# Patient Record
Sex: MALE | Race: WHITE | NOT HISPANIC OR LATINO | Employment: OTHER | URBAN - METROPOLITAN AREA
[De-identification: names, ages, dates, MRNs, and addresses within clinical notes are randomized per-mention and may not be internally consistent; named-entity substitution may affect disease eponyms.]

---

## 2017-02-13 ENCOUNTER — TELEPHONE (OUTPATIENT)
Dept: MEDICAL GROUP | Facility: MEDICAL CENTER | Age: 72
End: 2017-02-13

## 2017-02-13 NOTE — TELEPHONE ENCOUNTER
Gave pt wife info*-pt wife states you told her he has to be off for 5 days? Pt does not see cardio

## 2017-02-13 NOTE — TELEPHONE ENCOUNTER
Pt needs epidural.  Would need to come off plavix for 10-14 days.  He is only on 1/2 tab qod.  Please check with pt and see if he has a cardiologist.  There is some inc risk of CVA with coming off the plavix but then final decision is up to him.  He can see the cardiologist if he has one.

## 2017-02-26 RX ORDER — ATORVASTATIN CALCIUM 80 MG/1
TABLET, FILM COATED ORAL
Qty: 90 TAB | Refills: 0 | Status: SHIPPED | OUTPATIENT
Start: 2017-02-26 | End: 2017-05-28 | Stop reason: SDUPTHER

## 2017-02-28 ENCOUNTER — OFFICE VISIT (OUTPATIENT)
Dept: MEDICAL GROUP | Facility: MEDICAL CENTER | Age: 72
End: 2017-02-28
Payer: MEDICARE

## 2017-02-28 VITALS
BODY MASS INDEX: 26.01 KG/M2 | TEMPERATURE: 98.6 F | DIASTOLIC BLOOD PRESSURE: 80 MMHG | SYSTOLIC BLOOD PRESSURE: 140 MMHG | HEIGHT: 72 IN | HEART RATE: 76 BPM | WEIGHT: 192 LBS | OXYGEN SATURATION: 98 %

## 2017-02-28 DIAGNOSIS — R73.01 IMPAIRED FASTING GLUCOSE: ICD-10-CM

## 2017-02-28 DIAGNOSIS — R07.9 CHEST PAIN, UNSPECIFIED TYPE: ICD-10-CM

## 2017-02-28 DIAGNOSIS — D69.6 THROMBOCYTOPENIA (HCC): ICD-10-CM

## 2017-02-28 DIAGNOSIS — I10 ESSENTIAL HYPERTENSION: ICD-10-CM

## 2017-02-28 PROCEDURE — 99214 OFFICE O/P EST MOD 30 MIN: CPT | Performed by: NURSE PRACTITIONER

## 2017-02-28 PROCEDURE — G8432 DEP SCR NOT DOC, RNG: HCPCS | Performed by: NURSE PRACTITIONER

## 2017-02-28 PROCEDURE — 1101F PT FALLS ASSESS-DOCD LE1/YR: CPT | Performed by: NURSE PRACTITIONER

## 2017-02-28 PROCEDURE — G8420 CALC BMI NORM PARAMETERS: HCPCS | Performed by: NURSE PRACTITIONER

## 2017-02-28 PROCEDURE — 3017F COLORECTAL CA SCREEN DOC REV: CPT | Performed by: NURSE PRACTITIONER

## 2017-02-28 PROCEDURE — 1036F TOBACCO NON-USER: CPT | Performed by: NURSE PRACTITIONER

## 2017-02-28 PROCEDURE — 4040F PNEUMOC VAC/ADMIN/RCVD: CPT | Performed by: NURSE PRACTITIONER

## 2017-02-28 PROCEDURE — G8484 FLU IMMUNIZE NO ADMIN: HCPCS | Performed by: NURSE PRACTITIONER

## 2017-02-28 NOTE — PROGRESS NOTES
Subjective:      Mello Isabel is a 71 y.o. male who presents with No chief complaint on file.            HPI  Seen in f/u after hosp for chest pain.  He was having a stressful situation at home.  Then the pain occurred while shopping.  He also had a previous episode of pain 5 days before that woke him up.  He went to [facility].  Cardiac w/u and troponin was neg.  All sx resolved.  Bp was high.   His stress test was neg.  EF 58%.  THERE was a ? Of prior inf infarct???  Lab in hosp shows a1c up from 6.3 to 6.8.  LP is [facility].  CBC is wnl except sl low plts.   He just had another tc about the stress at home just before appt today.  Bp is again high.  Hasn't been checking bp at home.  He is now feeling well.     He has been having thoracic chest pain radiating to the chest.  He just got a injection in his back last week. It is helping some.  He is still waking up in am with the pain. Will get better throughout the day.      Patient Active Problem List    Diagnosis Date Noted   • Allergy to pollen    • Asthma, mild persistent    • DM (diabetes mellitus) type II controlled peripheral vascular disorder (CMS-[facility])    • TIA (transient ischemic attack)    • Essential hypertension    • Hyperlipidemia LDL goal <70      Current Outpatient Prescriptions   Medication Sig Dispense Refill   • atorvastatin (LIPITOR) 80 MG tablet TAKE 1 TABLET BY MOUTH EVERY EVENING 90 Tab 0   • lisinopril (PRINIVIL, ZESTRIL) 30 MG tablet TAKE 1 TABLET BY MOUTH EVERY DAY 90 Tab 1   • metformin (GLUCOPHAGE) 500 MG Tab TAKE 1 TABLET BY MOUTH EVERY DAY 90 Tab 1   • Blood Glucose Monitoring Suppl SUPPLIES Misc 1 Device by Does not apply route 2 times a day as needed. Test strips order: Test strips for free style lite meter 180 Device 3   • clopidogrel (PLAVIX) 75 MG Tab Take 1 Tab by mouth every day. Taking every other day 30 Tab 4   • Budesonide (PULMICORT INH) Inhale  by mouth.     • Ipratropium-Albuterol (COMBIVENT INH) Inhale  by mouth.       No current  facility-administered medications for this visit.     No Known Allergies      ROS  Review of Systems   Constitutional: Negative.  Negative for fever, chills, weight loss, malaise/fatigue and diaphoresis.   HENT: Negative.  Negative for hearing loss, ear pain, nosebleeds, congestion, sore throat, neck pain, tinnitus and ear discharge.    Respiratory: Negative.  Negative for cough, hemoptysis, sputum production, shortness of breath, wheezing and stridor.    Cardiovascular: Negative.  Negative for chest pain, palpitations, orthopnea, claudication, leg swelling and PND.   Gastrointestinal: denies nausea, vomiting, diarrhea, constipation, heartburn, melena or hematochezia.  Genitourinary: Denies dysuria, hematuria, urinary incontinence, frequency or urgency.           Objective:     /80 mmHg  Pulse 76  Temp(Src) 37 °C (98.6 °F)  Ht 1.829 m (6')  Wt 87.091 kg (192 lb)  BMI 26.03 kg/m2  SpO2 98%     Physical Exam      Physical Exam   Vitals reviewed.  Constitutional: oriented to person, place, and time. appears well-developed and well-nourished. No distress.   Cardiovascular: Normal rate, regular rhythm, normal heart sounds and intact distal pulses.  Exam reveals no gallop and no friction rub.  No murmur heard.  No carotid bruits.   Pulmonary/Chest: Effort normal and breath sounds normal. No stridor. No respiratory distress. no wheezes or rales. exhibits no tenderness.   Musculoskeletal: Normal range of motion. exhibits no edema. gilbert pedal pulses 2+.  Neurological: alert and oriented to person, place, and time. exhibits normal muscle tone. Coordination normal.   Skin: Skin is warm and dry. no diaphoresis.   Psychiatric: normal mood and affect. behavior is normal.            Assessment/Plan:     1. Chest pain, unspecified type      neg cardiac w/u in [facility] but stress mentions ? old inferior MI.  will consider repeat stress test 1 yr.  if pain reoccurs take tums and check bp   2. Essential hypertension      high  today.  not checking at home.  start monitoring daily.  fu 1 month for bp check   3. Impaired fasting glucose      improve low complex carb diet and exercise.  recheck lab in 6/17/  f/u for lab review   4. Thrombocytopenia (CMS-HCC)      was low in hosp.  will monitor.  no sx of bleeding

## 2017-02-28 NOTE — MR AVS SNAPSHOT
Mello Isabel   2017 1:00 PM   Office Visit   MRN: [id number]    Department:  [facility]   Dept Phone:  [phone]    Description:  Male : 1945   Provider:  KARRIE Rosales           Allergies as of 2017     No Known Allergies      You were diagnosed with     Chest pain, unspecified type   [6732207]   neg cardiac w/u in [facility] but stress mentions ? old inferior MI.  will consider repeat stress test 1 yr.  if pain reoccurs take tums and check bp    Essential hypertension   [8320865]         Vital Signs     Blood Pressure Pulse Temperature Height Weight Body Mass Index    140/80 mmHg 76 37 °C (98.6 °F) 1.829 m (6') 87.091 kg (192 lb) 26.03 kg/m2    Oxygen Saturation Smoking Status                98% Former Smoker          Basic Information     Date Of Birth Sex Race Ethnicity Preferred Language    1945 Male White Non- English      Your appointments     2017 10:30 AM   Established Patient with KARRIE Rosales   [facility])    [address]  [facility] [address]   [phone]           You will be receiving a confirmation call a few days before your appointment from our automated call confirmation system.              Problem List              ICD-10-CM Priority Class Noted - Resolved    Allergy to pollen J30.1   Unknown - Present    Asthma, mild persistent J45.30   Unknown - Present    DM (diabetes mellitus) type II controlled peripheral vascular disorder (CMS-HCC) E11.51   Unknown - Present    TIA (transient ischemic attack) G45.9   Unknown - Present    Essential hypertension I10   Unknown - Present    Hyperlipidemia LDL goal <70 E78.5   Unknown - Present      Health Maintenance        Date Due Completion Dates    IMM ZOSTER VACCINE 2005 ---    IMM INFLUENZA (1) 2016    RETINAL SCREENING 3/10/2017 3/10/2016 (Done)    Override on 3/10/2016: Done    DIABETES MONOFILAMENT / LE EXAM  3/24/2017 3/24/2016, 3/24/2016 (Done)    Override on 3/24/2016: Done    A1C SCREENING 5/21/2017 11/21/2016, 8/11/2016, 3/16/2016, 10/13/2015    FASTING LIPID PROFILE 8/11/2017 8/11/2016, 3/16/2016, 10/13/2015    URINE ACR / MICROALBUMIN 8/11/2017 8/11/2016, 10/13/2015    SERUM CREATININE 8/11/2017 8/11/2016, 10/13/2015    IMM PNEUMOCOCCAL 65+ (ADULT) LOW/MEDIUM RISK SERIES (2 of 2 - PPSV23) 8/23/2017 8/23/2016    COLONOSCOPY 2/3/2021 2/3/2016    IMM DTaP/Tdap/Td Vaccine (2 - Td) 4/7/2023 4/7/2013            Current Immunizations     13-VALENT PCV PREVNAR 8/23/2016    Influenza Vaccine Adult HD 9/17/2015    Tdap Vaccine 4/7/2013      Below and/or attached are the medications your provider expects you to take. Review all of your home medications and newly ordered medications with your provider and/or pharmacist. Follow medication instructions as directed by your provider and/or pharmacist. Please keep your medication list with you and share with your provider. Update the information when medications are discontinued, doses are changed, or new medications (including over-the-counter products) are added; and carry medication information at all times in the event of emergency situations     Allergies:  No Known Allergies          Medications  Valid as of: February 28, 2017 -  1:18 PM    Generic Name Brand Name Tablet Size Instructions for use    Atorvastatin Calcium (Tab) LIPITOR 80 MG TAKE 1 TABLET BY MOUTH EVERY EVENING        Blood Glucose Monitoring Suppl (Misc) Blood Glucose Monitoring Suppl SUPPLIES 1 Device by Does not apply route 2 times a day as needed. Test strips order: Test strips for free style lite meter        Budesonide   Inhale  by mouth.        Clopidogrel Bisulfate (Tab) PLAVIX 75 MG Take 1 Tab by mouth every day. Taking every other day        Ipratropium-Albuterol   Inhale  by mouth.        Lisinopril (Tab) PRINIVIL, ZESTRIL 30 MG TAKE 1 TABLET BY MOUTH EVERY DAY        MetFORMIN HCl (Tab) GLUCOPHAGE  500 MG TAKE 1 TABLET BY MOUTH EVERY DAY        .                 Medicines prescribed today were sent to:     [id number]  [place], [place] - [address] AT [facility] [address]    [address] [place] [address]    Phone: [phone] Fax: [phone]    Open 24 Hours?: No      Medication refill instructions:       If your prescription bottle indicates you have medication refills left, it is not necessary to call your provider’s office. Please contact your pharmacy and they will refill your medication.    If your prescription bottle indicates you do not have any refills left, you may request refills at any time through one of the following ways: The online [employer] system (except Urgent Care), by calling your provider’s office, or by asking your pharmacy to contact your provider’s office with a refill request. Medication refills are processed only during regular business hours and may not be available until the next business day. Your provider may request additional information or to have a follow-up visit with you prior to refilling your medication.   *Please Note: Medication refills are assigned a new Rx number when refilled electronically. Your pharmacy may indicate that no refills were authorized even though a new prescription for the same medication is available at the pharmacy. Please request the medicine by name with the pharmacy before contacting your provider for a refill.           6Sense Access Code: Activation code not generated  Current 6S[employer] Status: Active

## 2017-03-16 NOTE — PROGRESS NOTES
Pt states the allergy Dr. nora amanda Gave him a sample- pt states it worked for him -pt states 2 puffs 2x a day prn..did not state mg

## 2017-03-17 NOTE — PROGRESS NOTES
I need to know his dose to order the med. Please check with pt and see what it is and i will order.

## 2017-03-18 RX ORDER — BUDESONIDE AND FORMOTEROL FUMARATE DIHYDRATE 160; 4.5 UG/1; UG/1
2 AEROSOL RESPIRATORY (INHALATION) 2 TIMES DAILY
Qty: 3 INHALER | Refills: 1 | Status: SHIPPED | OUTPATIENT
Start: 2017-03-18 | End: 2017-10-19 | Stop reason: SDUPTHER

## 2017-03-29 ENCOUNTER — OFFICE VISIT (OUTPATIENT)
Dept: MEDICAL GROUP | Facility: MEDICAL CENTER | Age: 72
End: 2017-03-29
Payer: MEDICARE

## 2017-03-29 VITALS
OXYGEN SATURATION: 94 % | TEMPERATURE: 97.6 F | SYSTOLIC BLOOD PRESSURE: 130 MMHG | WEIGHT: 194 LBS | HEIGHT: 72 IN | HEART RATE: 86 BPM | BODY MASS INDEX: 26.28 KG/M2 | DIASTOLIC BLOOD PRESSURE: 70 MMHG

## 2017-03-29 DIAGNOSIS — G45.8 OTHER SPECIFIED TRANSIENT CEREBRAL ISCHEMIAS: ICD-10-CM

## 2017-03-29 DIAGNOSIS — I10 ESSENTIAL HYPERTENSION: ICD-10-CM

## 2017-03-29 DIAGNOSIS — E78.5 HYPERLIPIDEMIA LDL GOAL <70: ICD-10-CM

## 2017-03-29 PROCEDURE — G8484 FLU IMMUNIZE NO ADMIN: HCPCS | Performed by: NURSE PRACTITIONER

## 2017-03-29 PROCEDURE — 1036F TOBACCO NON-USER: CPT | Performed by: NURSE PRACTITIONER

## 2017-03-29 PROCEDURE — G8432 DEP SCR NOT DOC, RNG: HCPCS | Performed by: NURSE PRACTITIONER

## 2017-03-29 PROCEDURE — 99213 OFFICE O/P EST LOW 20 MIN: CPT | Performed by: NURSE PRACTITIONER

## 2017-03-29 PROCEDURE — 4040F PNEUMOC VAC/ADMIN/RCVD: CPT | Performed by: NURSE PRACTITIONER

## 2017-03-29 PROCEDURE — 1101F PT FALLS ASSESS-DOCD LE1/YR: CPT | Performed by: NURSE PRACTITIONER

## 2017-03-29 PROCEDURE — G8417 CALC BMI ABV UP PARAM F/U: HCPCS | Performed by: NURSE PRACTITIONER

## 2017-03-29 NOTE — MR AVS SNAPSHOT
Mello Isabel   3/29/2017 1:00 PM   Office Visit   MRN: [id number]    Department:  [facility]   Dept Phone:  [phone]    Description:  Male : 1945   Provider:  KARRIE Rosales           Allergies as of 3/29/2017     No Known Allergies      You were diagnosed with     Essential hypertension   [8945685]   stable w/o meds.  no changes    Other specified transient cerebral ischemias   [435.8.ICD-9-CM]   check carotid us.  f/u wiht pt with results and  for lab review    Hyperlipidemia LDL goal <70   [155618]   on meds.  will improve diet and exercise.  recheck lab in  for for review      Vital Signs     Blood Pressure Pulse Temperature Height Weight Body Mass Index    130/70 mmHg 86 36.4 °C (97.6 °F) 1.829 m (6') 87.998 kg (194 lb) 26.31 kg/m2    Oxygen Saturation Smoking Status                94% Former Smoker          Basic Information     Date Of Birth Sex Race Ethnicity Preferred Language    1945 Male White Non- English      Your appointments     2017 10:30 AM   Established Patient with KARRIE Rosales   [facility])    [address]  [facility] [id number] [phone]           You will be receiving a confirmation call a few days before your appointment from our automated call confirmation system.              Problem List              ICD-10-CM Priority Class Noted - Resolved    Allergy to pollen J30.1   Unknown - Present    Asthma, mild persistent J45.30   Unknown - Present    DM (diabetes mellitus) type II controlled peripheral vascular disorder (CMS-HCC) E11.51   Unknown - Present    TIA (transient ischemic attack) G45.9   Unknown - Present    Essential hypertension I10   Unknown - Present    Hyperlipidemia LDL goal <70 E78.5   Unknown - Present      Health Maintenance        Date Due Completion Dates    IMM ZOSTER VACCINE 2005 ---    IMM INFLUENZA (1) 2016    RETINAL SCREENING  3/10/2017 3/10/2016 (Done)    Override on 3/10/2016: Done    DIABETES MONOFILAMENT / LE EXAM 3/24/2017 3/24/2016, 3/24/2016 (Done)    Override on 3/24/2016: Done    A1C SCREENING 5/21/2017 11/21/2016, 8/11/2016, 3/16/2016, 10/13/2015    FASTING LIPID PROFILE 8/11/2017 8/11/2016, 3/16/2016, 10/13/2015    URINE ACR / MICROALBUMIN 8/11/2017 8/11/2016, 10/13/2015    SERUM CREATININE 8/11/2017 8/11/2016, 10/13/2015    IMM PNEUMOCOCCAL 65+ (ADULT) LOW/MEDIUM RISK SERIES (2 of 2 - PPSV23) 8/23/2017 8/23/2016    COLONOSCOPY 2/3/2021 2/3/2016    IMM DTaP/Tdap/Td Vaccine (2 - Td) 4/7/2023 4/7/2013            Current Immunizations     13-VALENT PCV PREVNAR 8/23/2016    Influenza Vaccine Adult HD 9/17/2015    Tdap Vaccine 4/7/2013      Below and/or attached are the medications your provider expects you to take. Review all of your home medications and newly ordered medications with your provider and/or pharmacist. Follow medication instructions as directed by your provider and/or pharmacist. Please keep your medication list with you and share with your provider. Update the information when medications are discontinued, doses are changed, or new medications (including over-the-counter products) are added; and carry medication information at all times in the event of emergency situations     Allergies:  No Known Allergies          Medications  Valid as of: March 29, 2017 -  2:59 PM    Generic Name Brand Name Tablet Size Instructions for use    Atorvastatin Calcium (Tab) LIPITOR 80 MG TAKE 1 TABLET BY MOUTH EVERY EVENING        Blood Glucose Monitoring Suppl (Misc) Blood Glucose Monitoring Suppl SUPPLIES 1 Device by Does not apply route 2 times a day as needed. Test strips order: Test strips for free style lite meter        Budesonide   Inhale  by mouth.        Budesonide-Formoterol Fumarate (Aerosol) SYMBICORT 160-4.5 MCG/ACT Inhale 2 Puffs by mouth 2 Times a Day.        Clopidogrel Bisulfate (Tab) PLAVIX 75 MG Take 1 Tab by mouth  every day. Taking every other day        Ipratropium-Albuterol   Inhale  by mouth.        Ipratropium-Albuterol (Aero Soln) COMBIVENT RESPIMAT  MCG/ACT Inhale 1 Puff by mouth 4 times a day.        Lisinopril (Tab) PRINIVIL, ZESTRIL 30 MG TAKE 1 TABLET BY MOUTH EVERY DAY        MetFORMIN HCl (Tab) GLUCOPHAGE 500 MG TAKE 1 TABLET BY MOUTH EVERY DAY        .                 Medicines prescribed today were sent to:     [id number]  [place], [place] - [address] AT [facility] & [facility]    [address] [place] [address]    Phone: [phone] Fax: [phone]    Open 24 Hours?: No      Medication refill instructions:       If your prescription bottle indicates you have medication refills left, it is not necessary to call your provider’s office. Please contact your pharmacy and they will refill your medication.    If your prescription bottle indicates you do not have any refills left, you may request refills at any time through one of the following ways: The online [employer] system (except Urgent Care), by calling your provider’s office, or by asking your pharmacy to contact your provider’s office with a refill request. Medication refills are processed only during regular business hours and may not be available until the next business day. Your provider may request additional information or to have a follow-up visit with you prior to refilling your medication.   *Please Note: Medication refills are assigned a new Rx number when refilled electronically. Your pharmacy may indicate that no refills were authorized even though a new prescription for the same medication is available at the pharmacy. Please request the medicine by name with the pharmacy before contacting your provider for a refill.        Your To Do List     Future Labs/Procedures Complete By Expires    US-CAROTID DOPPLER  As directed 9/29/2017         [employer] Access Code: Activation code not generated  Current [employer] Status:  Active

## 2017-03-29 NOTE — PROGRESS NOTES
Subjective:      Mello Isabel is a 71 y.o. male who presents with No chief complaint on file.            HPI  Seen in f/u for HTN.  His bp was elevated last visit.  Taking meds approp.  Today his bp is controlled.  He brings in bp diary with normal bp.  His bp home cuff is also accurate.    He has determnied that coffee was raising his bp along with stress.  Stress is better.  Off all caffeine.  No headache, dizziness, chest pain or sob.  Reviewed his hosp MR.  The ekg showed changes during his stress but we don't have myocardial perfusion results.    Dr krueger put him on antidepressant. He hasn't taken it in several days.  They can't remember what it was.  He only took it for a week.  It was for the pain and didn't help the apin.  Also has lots of anxiety.    He is going to get antoher injection in his back from [facility].  He has a hx of TIA's x2 in 2012.  No recent carotid us.    Lab in hosp showed HDL is better but LDL is worse.  Will improve diet.   Patient Active Problem List    Diagnosis Date Noted   • Allergy to pollen    • Asthma, mild persistent    • DM (diabetes mellitus) type II controlled peripheral vascular disorder (CMS-HCC)    • TIA (transient ischemic attack)    • Essential hypertension    • Hyperlipidemia LDL goal <70      Current Outpatient Prescriptions   Medication Sig Dispense Refill   • budesonide-formoterol (SYMBICORT) 160-4.5 MCG/ACT Aerosol Inhale 2 Puffs by mouth 2 Times a Day. 3 Inhaler 1   • ipratropium-albuterol (COMBIVENT RESPIMAT)  MCG/ACT Aero Soln Inhale 1 Puff by mouth 4 times a day. 1 Inhaler 3   • atorvastatin (LIPITOR) 80 MG tablet TAKE 1 TABLET BY MOUTH EVERY EVENING 90 Tab 0   • lisinopril (PRINIVIL, ZESTRIL) 30 MG tablet TAKE 1 TABLET BY MOUTH EVERY DAY 90 Tab 1   • metformin (GLUCOPHAGE) 500 MG Tab TAKE 1 TABLET BY MOUTH EVERY DAY 90 Tab 1   • Blood Glucose Monitoring Suppl SUPPLIES Misc 1 Device by Does not apply route 2 times a day as needed. Test strips order: Test  strips for free style lite meter 180 Device 3   • clopidogrel (PLAVIX) 75 MG Tab Take 1 Tab by mouth every day. Taking every other day 30 Tab 4   • Budesonide (PULMICORT INH) Inhale  by mouth.     • Ipratropium-Albuterol (COMBIVENT INH) Inhale  by mouth.       No current facility-administered medications for this visit.     No Known Allergies    ROS    Review of Systems   Constitutional: Negative.  Negative for fever, chills, weight loss, malaise/fatigue and diaphoresis.   HENT: Negative.  Negative for hearing loss, ear pain, nosebleeds, congestion, sore throat, neck pain, tinnitus and ear discharge.    Respiratory: Negative.  Negative for cough, hemoptysis, sputum production, shortness of breath, wheezing and stridor.    Cardiovascular: Negative.  Negative for chest pain, palpitations, orthopnea, claudication, leg swelling and PND.   Gastrointestinal: denies nausea, vomiting, diarrhea, constipation, heartburn, melena or hematochezia.  Genitourinary: Denies dysuria, hematuria, urinary incontinence, frequency or urgency.         Objective:     /70 mmHg  Pulse 86  Temp(Src) 36.4 °C (97.6 °F)  Ht 1.829 m (6')  Wt 87.998 kg (194 lb)  BMI 26.31 kg/m2  SpO2 94%     Physical Exam      Physical Exam   Vitals reviewed.  Constitutional: oriented to person, place, and time. appears well-developed and well-nourished. No distress.   Cardiovascular: Normal rate, regular rhythm, normal heart sounds and intact distal pulses.  Exam reveals no gallop and no friction rub.  No murmur heard.  No carotid bruits.   Pulmonary/Chest: Effort normal and breath sounds normal. No stridor. No respiratory distress. no wheezes or rales. exhibits no tenderness.   Musculoskeletal: Normal range of motion. exhibits no edema. gilbert pedal pulses 2+.  Neurological: alert and oriented to person, place, and time. exhibits normal muscle tone. Coordination normal.   Skin: Skin is warm and dry. no diaphoresis.   Psychiatric: normal mood and affect.  behavior is normal.            Assessment/Plan:     1. Essential hypertension  US-CAROTID DOPPLER    stable w/o meds.  no changes   2. Other specified transient cerebral ischemias  US-CAROTID DOPPLER    check carotid us.  f/u wiht pt with results and 6/17 for lab review   3. Hyperlipidemia LDL goal <70      on meds.  will improve diet and exercise.  recheck lab in 6/17 for for review

## 2017-04-03 ENCOUNTER — OFFICE VISIT (OUTPATIENT)
Dept: URGENT CARE | Facility: PHYSICIAN GROUP | Age: 72
End: 2017-04-03
Payer: MEDICARE

## 2017-04-03 VITALS
HEART RATE: 74 BPM | WEIGHT: 190 LBS | SYSTOLIC BLOOD PRESSURE: 152 MMHG | OXYGEN SATURATION: 96 % | DIASTOLIC BLOOD PRESSURE: 64 MMHG | BODY MASS INDEX: 25.76 KG/M2 | TEMPERATURE: 97.7 F

## 2017-04-03 DIAGNOSIS — S60.212A: ICD-10-CM

## 2017-04-03 PROCEDURE — 1101F PT FALLS ASSESS-DOCD LE1/YR: CPT | Performed by: NURSE PRACTITIONER

## 2017-04-03 PROCEDURE — 1036F TOBACCO NON-USER: CPT | Performed by: NURSE PRACTITIONER

## 2017-04-03 PROCEDURE — 99213 OFFICE O/P EST LOW 20 MIN: CPT | Performed by: NURSE PRACTITIONER

## 2017-04-03 PROCEDURE — G8432 DEP SCR NOT DOC, RNG: HCPCS | Performed by: NURSE PRACTITIONER

## 2017-04-03 PROCEDURE — 4040F PNEUMOC VAC/ADMIN/RCVD: CPT | Performed by: NURSE PRACTITIONER

## 2017-04-03 PROCEDURE — G8417 CALC BMI ABV UP PARAM F/U: HCPCS | Performed by: NURSE PRACTITIONER

## 2017-04-03 ASSESSMENT — ENCOUNTER SYMPTOMS
GASTROINTESTINAL NEGATIVE: 1
MYALGIAS: 0
NEUROLOGICAL NEGATIVE: 1
CARDIOVASCULAR NEGATIVE: 1
CONSTITUTIONAL NEGATIVE: 1
PSYCHIATRIC NEGATIVE: 1
RESPIRATORY NEGATIVE: 1
MUSCULOSKELETAL NEGATIVE: 1

## 2017-04-04 ENCOUNTER — TELEPHONE (OUTPATIENT)
Dept: MEDICAL GROUP | Facility: MEDICAL CENTER | Age: 72
End: 2017-04-04

## 2017-04-04 RX ORDER — NORTRIPTYLINE HYDROCHLORIDE 10 MG/1
10 CAPSULE ORAL
Qty: 90 CAP | Refills: 0 | Status: SHIPPED | OUTPATIENT
Start: 2017-04-04 | End: 2017-08-30

## 2017-04-04 NOTE — PROGRESS NOTES
Subjective:      Mello Isabel is a 71 y.o. male who presents with Bleeding/Bruising          HPI     The patient is here today with concerns of discoloration to his left wrist. He admits that he had an IV placed to his left wrist on Friday for an outpatient procedure of his spine. States that the nurse placing the IV was having a difficult time with placement and attempted placement multiple times. He became concerned when he noticed discoloration to his wrist yesterday, worsening today. He admits to mild soreness at the precise IV placement site but denies significant pain to the area. He also denies associated swelling, redness, numbness, tingling, or warmth to the area. He has not done anything for symptom only. He does take Plavix every other day, half a tab, for a history of TIA, but did not take his Plavix several days prior to the procedure. The last Plavix dose he had was yesterday.    Past Medical History   Diagnosis Date   • Hyperlipidemia LDL goal <70    • Essential hypertension    • TIA (transient ischemic attack) 2012     x2   • DM (diabetes mellitus) type II controlled peripheral vascular disorder (CMS-[facility])    • Asthma, mild persistent    • Allergy to pollen      Past Surgical History   Procedure Laterality Date   • Cataract phaco with iol       gilbert eyes   • Retinal detachment repair       gilbert eyes   • Knee arthroscopy       x2 left     Current Outpatient Prescriptions on File Prior to Visit   Medication Sig Dispense Refill   • budesonide-formoterol (SYMBICORT) 160-4.5 MCG/ACT Aerosol Inhale 2 Puffs by mouth 2 Times a Day. 3 Inhaler 1   • ipratropium-albuterol (COMBIVENT RESPIMAT)  MCG/ACT Aero Soln Inhale 1 Puff by mouth 4 times a day. 1 Inhaler 3   • atorvastatin (LIPITOR) 80 MG tablet TAKE 1 TABLET BY MOUTH EVERY EVENING 90 Tab 0   • lisinopril (PRINIVIL, ZESTRIL) 30 MG tablet TAKE 1 TABLET BY MOUTH EVERY DAY 90 Tab 1   • metformin (GLUCOPHAGE) 500 MG Tab TAKE 1 TABLET BY MOUTH EVERY DAY 90  Tab 1   • Blood Glucose Monitoring Suppl SUPPLIES Misc 1 Device by Does not apply route 2 times a day as needed. Test strips order: Test strips for free style lite meter 180 Device 3   • clopidogrel (PLAVIX) 75 MG Tab Take 1 Tab by mouth every day. Taking every other day 30 Tab 4   • Budesonide (PULMICORT INH) Inhale  by mouth.     • Ipratropium-Albuterol (COMBIVENT INH) Inhale  by mouth.       No current facility-administered medications on file prior to visit.     Review of patient's allergies indicates no known allergies.        Review of Systems   Constitutional: Negative.    HENT: Negative.    Respiratory: Negative.    Cardiovascular: Negative.    Gastrointestinal: Negative.    Genitourinary: Negative.    Musculoskeletal: Negative.  Negative for myalgias.   Skin: Negative for itching and rash.        Discoloration to his left wrist   Neurological: Negative.    Endo/Heme/Allergies: Negative.    Psychiatric/Behavioral: Negative.           Objective:     /64 mmHg  Pulse 74  Temp(Src) 36.5 °C (97.7 °F)  Wt 86.183 kg (190 lb)  SpO2 96%     Physical Exam   Constitutional: He is oriented to person, place, and time. Vital signs are normal. He appears well-developed and well-nourished. He does not appear ill. No distress.   HENT:   Head: Normocephalic and atraumatic.   Right Ear: External ear normal.   Left Ear: External ear normal.   Nose: Nose normal.   Mouth/Throat: Oropharynx is clear and moist.   Eyes: Conjunctivae are normal. Pupils are equal, round, and reactive to light. Right eye exhibits no discharge. Left eye exhibits no discharge.   Neck: Normal range of motion. Neck supple.   Cardiovascular: Normal rate, regular rhythm, normal heart sounds and intact distal pulses.    Pulmonary/Chest: Effort normal and breath sounds normal. No respiratory distress.   Musculoskeletal: Normal range of motion. He exhibits no edema.        Left wrist: He exhibits normal range of motion, no bony tenderness, no  swelling, no effusion, no crepitus, no deformity and no laceration.   Neurological: He is alert and oriented to person, place, and time. No cranial nerve deficit. He exhibits normal muscle tone. Coordination normal.   Skin: Skin is warm, dry and intact. Ecchymosis noted. No rash noted. He is not diaphoretic. No erythema. No pallor.        Psychiatric: He has a normal mood and affect. His behavior is normal. Judgment and thought content normal.   Vitals reviewed.              Assessment/Plan:     1. Traumatic ecchymosis of wrist, left, initial encounter      At this time I do not feel the area requires radiology studies or prescription medication intervention. He is encouraged to monitor area closely for swelling, pain, warmth, erythema. He is instructed to return immediately if symptoms develop and discussed the possible need of US study and/or antibiotics at that time.   Supportive care, differential diagnoses, and indications for immediate follow-up discussed with patient.   Pathogenesis of diagnosis discussed including typical length and natural progression.   Instructed to return to clinic or nearest emergency department for any change in condition, further concerns, or worsening of symptoms.  Patient states understanding of the plan of care and discharge instructions.  Instructed to make an appointment, for follow up, with their primary care provider.        SARAH BETH Franklin.

## 2017-04-04 NOTE — TELEPHONE ENCOUNTER
Pt state you told him to contact you about the sleeping medication. Pt is requesting nortriptyline 10mg 1 at bed time

## 2017-04-06 ENCOUNTER — TELEPHONE (OUTPATIENT)
Dept: URGENT CARE | Facility: PHYSICIAN GROUP | Age: 72
End: 2017-04-06

## 2017-04-06 NOTE — TELEPHONE ENCOUNTER
The patient was called for re-evaluation, a message was left with his wife, encouraged to call back to the clinic or return to clinic with any questions or concerns.

## 2017-04-13 ENCOUNTER — TELEPHONE (OUTPATIENT)
Dept: MEDICAL GROUP | Facility: MEDICAL CENTER | Age: 72
End: 2017-04-13

## 2017-04-13 ENCOUNTER — HOSPITAL ENCOUNTER (OUTPATIENT)
Dept: RADIOLOGY | Facility: MEDICAL CENTER | Age: 72
End: 2017-04-13
Attending: NURSE PRACTITIONER
Payer: MEDICARE

## 2017-04-13 DIAGNOSIS — I10 ESSENTIAL HYPERTENSION: ICD-10-CM

## 2017-04-13 DIAGNOSIS — G45.8 OTHER SPECIFIED TRANSIENT CEREBRAL ISCHEMIAS: ICD-10-CM

## 2017-04-13 PROCEDURE — 93880 EXTRACRANIAL BILAT STUDY: CPT

## 2017-04-13 NOTE — TELEPHONE ENCOUNTER
Please let pt know that his carotid us shows only mild plaque.  Just needs to make sure chol and bp stay controlled

## 2017-04-20 ENCOUNTER — HOSPITAL ENCOUNTER (OUTPATIENT)
Dept: LAB | Facility: MEDICAL CENTER | Age: 72
End: 2017-04-20
Attending: NURSE PRACTITIONER
Payer: MEDICARE

## 2017-04-20 DIAGNOSIS — E11.51 DM (DIABETES MELLITUS) TYPE II CONTROLLED PERIPHERAL VASCULAR DISORDER: ICD-10-CM

## 2017-04-20 DIAGNOSIS — I10 ESSENTIAL HYPERTENSION: ICD-10-CM

## 2017-04-20 DIAGNOSIS — Z12.5 SCREENING FOR PROSTATE CANCER: ICD-10-CM

## 2017-04-20 DIAGNOSIS — E78.5 HYPERLIPIDEMIA LDL GOAL <70: ICD-10-CM

## 2017-04-20 LAB
ALBUMIN SERPL BCP-MCNC: 4.1 G/DL (ref 3.2–4.9)
ALBUMIN/GLOB SERPL: 1.8 G/DL
ALP SERPL-CCNC: 54 U/L (ref 30–99)
ALT SERPL-CCNC: 18 U/L (ref 2–50)
ANION GAP SERPL CALC-SCNC: 6 MMOL/L (ref 0–11.9)
AST SERPL-CCNC: 20 U/L (ref 12–45)
BILIRUB SERPL-MCNC: 0.6 MG/DL (ref 0.1–1.5)
BUN SERPL-MCNC: 28 MG/DL (ref 8–22)
CALCIUM SERPL-MCNC: 9.4 MG/DL (ref 8.5–10.5)
CHLORIDE SERPL-SCNC: 107 MMOL/L (ref 96–112)
CHOLEST SERPL-MCNC: 151 MG/DL (ref 100–199)
CO2 SERPL-SCNC: 26 MMOL/L (ref 20–33)
CREAT SERPL-MCNC: 1.15 MG/DL (ref 0.5–1.4)
CREAT UR-MCNC: 101.2 MG/DL
EST. AVERAGE GLUCOSE BLD GHB EST-MCNC: 146 MG/DL
GFR SERPL CREATININE-BSD FRML MDRD: >60 ML/MIN/1.73 M 2
GLOBULIN SER CALC-MCNC: 2.3 G/DL (ref 1.9–3.5)
GLUCOSE SERPL-MCNC: 95 MG/DL (ref 65–99)
HBA1C MFR BLD: 6.7 % (ref 0–5.6)
HDLC SERPL-MCNC: 50 MG/DL
LDLC SERPL CALC-MCNC: 82 MG/DL
MICROALBUMIN UR-MCNC: <0.7 MG/DL
MICROALBUMIN/CREAT UR: NORMAL MG/G (ref 0–30)
POTASSIUM SERPL-SCNC: 4.3 MMOL/L (ref 3.6–5.5)
PROT SERPL-MCNC: 6.4 G/DL (ref 6–8.2)
PSA SERPL-MCNC: 1.15 NG/ML (ref 0–4)
SODIUM SERPL-SCNC: 139 MMOL/L (ref 135–145)
TRIGL SERPL-MCNC: 96 MG/DL (ref 0–149)

## 2017-04-20 PROCEDURE — 82043 UR ALBUMIN QUANTITATIVE: CPT

## 2017-04-20 PROCEDURE — 80061 LIPID PANEL: CPT

## 2017-04-20 PROCEDURE — 84153 ASSAY OF PSA TOTAL: CPT | Mod: GA

## 2017-04-20 PROCEDURE — 36415 COLL VENOUS BLD VENIPUNCTURE: CPT

## 2017-04-20 PROCEDURE — 82570 ASSAY OF URINE CREATININE: CPT

## 2017-04-20 PROCEDURE — 80053 COMPREHEN METABOLIC PANEL: CPT

## 2017-04-20 PROCEDURE — 83036 HEMOGLOBIN GLYCOSYLATED A1C: CPT | Mod: GA

## 2017-04-27 ENCOUNTER — OFFICE VISIT (OUTPATIENT)
Dept: MEDICAL GROUP | Facility: MEDICAL CENTER | Age: 72
End: 2017-04-27
Payer: MEDICARE

## 2017-04-27 VITALS
TEMPERATURE: 98 F | HEIGHT: 72 IN | DIASTOLIC BLOOD PRESSURE: 80 MMHG | SYSTOLIC BLOOD PRESSURE: 128 MMHG | WEIGHT: 193 LBS | BODY MASS INDEX: 26.14 KG/M2 | HEART RATE: 67 BPM | OXYGEN SATURATION: 98 %

## 2017-04-27 DIAGNOSIS — E78.5 HYPERLIPIDEMIA LDL GOAL <70: ICD-10-CM

## 2017-04-27 DIAGNOSIS — E11.51 DM (DIABETES MELLITUS) TYPE II CONTROLLED PERIPHERAL VASCULAR DISORDER: ICD-10-CM

## 2017-04-27 PROCEDURE — 99214 OFFICE O/P EST MOD 30 MIN: CPT | Performed by: NURSE PRACTITIONER

## 2017-04-27 NOTE — PROGRESS NOTES
Subjective:      Mello Isabel is a 71 y.o. male who presents with Diabetes            Diabetes    seen in /fu for DM.  Feeling well.  Just seen by Renetta.  He has gained some wt and feels better.    Reviewed lab with pt. His CMP, GFR, PSA, alb/cr ratio, LP is wnl  A1C is up from 6.3 to 6.9.  Will improve diet.  Taking meds approp.    Patient Active Problem List    Diagnosis Date Noted   • Allergy to pollen    • Asthma, mild persistent    • DM (diabetes mellitus) type II controlled peripheral vascular disorder (CMS-HCC)    • TIA (transient ischemic attack)    • Essential hypertension    • Hyperlipidemia LDL goal <70      Current Outpatient Prescriptions   Medication Sig Dispense Refill   • budesonide-formoterol (SYMBICORT) 160-4.5 MCG/ACT Aerosol Inhale 2 Puffs by mouth 2 Times a Day. 3 Inhaler 1   • ipratropium-albuterol (COMBIVENT RESPIMAT)  MCG/ACT Aero Soln Inhale 1 Puff by mouth 4 times a day. 1 Inhaler 3   • atorvastatin (LIPITOR) 80 MG tablet TAKE 1 TABLET BY MOUTH EVERY EVENING 90 Tab 0   • lisinopril (PRINIVIL, ZESTRIL) 30 MG tablet TAKE 1 TABLET BY MOUTH EVERY DAY 90 Tab 1   • metformin (GLUCOPHAGE) 500 MG Tab TAKE 1 TABLET BY MOUTH EVERY DAY 90 Tab 1   • Blood Glucose Monitoring Suppl SUPPLIES Misc 1 Device by Does not apply route 2 times a day as needed. Test strips order: Test strips for free style lite meter 180 Device 3   • clopidogrel (PLAVIX) 75 MG Tab Take 1 Tab by mouth every day. Taking every other day 30 Tab 4   • nortriptyline (PAMELOR) 10 MG Cap Take 1 Cap by mouth every bedtime. 90 Cap 0   • Budesonide (PULMICORT INH) Inhale  by mouth.       No current facility-administered medications for this visit.     No Known Allergies        ROS  Review of Systems   Constitutional: Negative.  Negative for fever, chills, weight loss, malaise/fatigue and diaphoresis.   HENT: Negative.  Negative for hearing loss, ear pain, nosebleeds, congestion, sore throat, neck pain, tinnitus and ear discharge.     Respiratory: Negative.  Negative for cough, hemoptysis, sputum production, shortness of breath, wheezing and stridor.    Cardiovascular: Negative.  Negative for chest pain, palpitations, orthopnea, claudication, leg swelling and PND.   Gastrointestinal: denies nausea, vomiting, diarrhea, constipation, heartburn, melena or hematochezia.  Genitourinary: Denies dysuria, hematuria, urinary incontinence, frequency or urgency.           Objective:     /80 mmHg  Pulse 67  Temp(Src) 36.7 °C (98 °F)  Ht 1.829 m (6')  Wt 87.544 kg (193 lb)  BMI 26.17 kg/m2  SpO2 98%     Physical Exam  Physical Exam   Vitals reviewed.  Constitutional: oriented to person, place, and time. appears well-developed and well-nourished. No distress.   Cardiovascular: Normal rate, regular rhythm, normal heart sounds and intact distal pulses.  Exam reveals no gallop and no friction rub.  No murmur heard.  No carotid bruits.   Pulmonary/Chest: Effort normal and breath sounds normal. No stridor. No respiratory distress. no wheezes or rales. exhibits no tenderness.   Musculoskeletal: Normal range of motion. exhibits no edema. gilbert pedal pulses 2+.  Lymphadenopathy: no cervical or supraclavicular adenopathy.   Neurological: alert and oriented to person, place, and time. exhibits normal muscle tone. Coordination normal.   Skin: Skin is warm and dry. no diaphoresis.   Psychiatric: normal mood and affect. behavior is normal.               Assessment/Plan:         1. DM (diabetes mellitus) type II controlled peripheral vascular disorder (CMS-[facility])  HEMOGLOBIN A1C (For A1C Every 6 Months Topic)    controlled and stable on meds.  recheck a1c 4 months f/u for review   2. Hyperlipidemia LDL goal <70      controlled and stable on meds.

## 2017-04-27 NOTE — PROGRESS NOTES
RN-HARPERE Note  Type 2 Diabetes  Subjective:     Health changes since last visit/interval Hx: General Health is good.    Medications (including changes made today)  Current Outpatient Prescriptions   Medication Sig Dispense Refill   • budesonide-formoterol (SYMBICORT) 160-4.5 MCG/ACT Aerosol Inhale 2 Puffs by mouth 2 Times a Day. 3 Inhaler 1   • ipratropium-albuterol (COMBIVENT RESPIMAT)  MCG/ACT Aero Soln Inhale 1 Puff by mouth 4 times a day. 1 Inhaler 3   • atorvastatin (LIPITOR) 80 MG tablet TAKE 1 TABLET BY MOUTH EVERY EVENING 90 Tab 0   • lisinopril (PRINIVIL, ZESTRIL) 30 MG tablet TAKE 1 TABLET BY MOUTH EVERY DAY 90 Tab 1   • metformin (GLUCOPHAGE) 500 MG Tab TAKE 1 TABLET BY MOUTH EVERY DAY 90 Tab 1   • Blood Glucose Monitoring Suppl SUPPLIES Misc 1 Device by Does not apply route 2 times a day as needed. Test strips order: Test strips for free style lite meter 180 Device 3   • clopidogrel (PLAVIX) 75 MG Tab Take 1 Tab by mouth every day. Taking every other day 30 Tab 4   • nortriptyline (PAMELOR) 10 MG Cap Take 1 Cap by mouth every bedtime. 90 Cap 0   • Budesonide (PULMICORT INH) Inhale  by mouth.     • Ipratropium-Albuterol (COMBIVENT INH) Inhale  by mouth.       No current facility-administered medications for this visit.         Taking daily ASA: Yes  Taking above medications as prescribed: Yes   Patient Denies side effects of medication.    Exercise: Walking daily and going to the gym  Diet: 3 meals and eating healthy.    Health Maintenance:   Health Maintenance Topics with due status: Overdue       Topic Date Due    Annual Wellness Visit 1945    IMM ZOSTER VACCINE 05/05/2005    RETINAL SCREENING 03/10/2017    DIABETES MONOFILAMENT / LE EXAM 03/24/2017         DM:   Last A1c:   Lab Results   Component Value Date/Time    GLYCOHEMOGLOBIN 6.7* 04/20/2017 06:14 AM      A1c goal: < 7    Glucose monitoring frequency: 3 times weekly or more  trend: 100-140  Hypoglycemic episodes: no     Last Retinal  Exam: States just had a glaucoma check last week Provider: Does not remember name  Daily Foot Exam: yes  Routine Dental Exams: yes    Lab Results   Component Value Date/Time    MICRO ALB CREAT RATIO see below 04/20/2017 06:13 AM    MICROALBUMIN, URINE RANDOM <0.7 04/20/2017 06:13 AM        ACR Albumin/Creatinine Ratio goal <30     Diabetic complications: none    HTN:   Blood pressure goal <140/<80 .   Currently Rx ACE/ARB: Yes    Dyslipidemia:    Lab Results   Component Value Date/Time    CHOLESTEROL, 04/20/2017 06:14 AM    LDL 82 04/20/2017 06:14 AM    HDL 50 04/20/2017 06:14 AM    TRIGLYCERIDES 96 04/20/2017 06:14 AM       Lab Results   Component Value Date/Time    SODIUM 139 04/20/2017 06:14 AM    POTASSIUM 4.3 04/20/2017 06:14 AM    CHLORIDE 107 04/20/2017 06:14 AM    CO2 26 04/20/2017 06:14 AM    GLUCOSE 95 04/20/2017 06:14 AM    BUN 28* 04/20/2017 06:14 AM    CREATININE 1.15 04/20/2017 06:14 AM     Lab Results   Component Value Date/Time    ALKALINE PHOSPHATASE 54 04/20/2017 06:14 AM    AST(SGOT) 20 04/20/2017 06:14 AM    ALT(SGPT) 18 04/20/2017 06:14 AM    TOTAL BILIRUBIN 0.6 04/20/2017 06:14 AM        Currently Rx Statin: Yes      He  reports that he has quit smoking. He has never used smokeless tobacco.    Objective:     Exam:  Monofilament: done  Monofilament testing with a 10 gram force: sensation intact: intact bilaterally  Visual Inspection: Feet without maceration, ulcers, fissures.  Pedal pulses: intact bilaterally      Plan:     - Diabetic diet discussed in detail-plate method.  - Home glucose monitoring.  - He will test and log.    - He will walk for 20-30 minutes daily.  - Reviewed medications and advised to take metformin after meals to decrease   G.I.upset.   - Discussed importance of immunizations and yearly eye exams.    -Educational material distributed.   - Advised daily foot exams. Educated on signs of infection.       Recommended medication changes: No changes at this time.

## 2017-04-27 NOTE — MR AVS SNAPSHOT
Mello Isabel   2017 10:20 AM   Office Visit   MRN: [id number]    Department:  [facility]   Dept Phone:  [phone]    Description:  Male : 1945   Provider:  KARRIE Rosales; [facility] DIABETES RN           Reason for Visit     Diabetes           Allergies as of 2017     No Known Allergies      You were diagnosed with     DM (diabetes mellitus) type II controlled peripheral vascular disorder (CMS-HCC)   [075644]   controlled and stable on meds.  recheck a1c 4 months f/u for review    Hyperlipidemia LDL goal <70   [735028]   controlled and stable on meds.       Vital Signs     Blood Pressure Pulse Temperature Height Weight Body Mass Index    128/80 mmHg 67 36.7 °C (98 °F) 1.829 m (6') 87.544 kg (193 lb) 26.17 kg/m2    Oxygen Saturation Smoking Status                98% Former Smoker          Basic Information     Date Of Birth Sex Race Ethnicity Preferred Language    1945 Male White Non- English      Problem List              ICD-10-CM Priority Class Noted - Resolved    Allergy to pollen J30.1   Unknown - Present    Asthma, mild persistent J45.30   Unknown - Present    DM (diabetes mellitus) type II controlled peripheral vascular disorder (CMS-HCC) E11.51   Unknown - Present    TIA (transient ischemic attack) G45.9   Unknown - Present    Essential hypertension I10   Unknown - Present    Hyperlipidemia LDL goal <70 E78.5   Unknown - Present      Health Maintenance        Date Due Completion Dates    IMM ZOSTER VACCINE 2005 ---    RETINAL SCREENING 3/10/2017 3/10/2016 (Done)    Override on 3/10/2016: Done    IMM PNEUMOCOCCAL 65+ (ADULT) LOW/MEDIUM RISK SERIES (2 of 2 - PPSV23) 2017    A1C SCREENING 10/20/2017 2017, 2016, 2016, 3/16/2016, 10/13/2015    FASTING LIPID PROFILE 2018, 2016, 3/16/2016, 10/13/2015    URINE ACR / MICROALBUMIN 2018, 2016, 10/13/2015    SERUM CREATININE 2018  4/20/2017, 8/11/2016, 10/13/2015    DIABETES MONOFILAMENT / LE EXAM 4/27/2018 4/27/2017 (Done), 3/24/2016, 3/24/2016 (Done)    Override on 4/27/2017: Done    Override on 3/24/2016: Done    COLONOSCOPY 2/3/2021 2/3/2016    IMM DTaP/Tdap/Td Vaccine (2 - Td) 4/7/2023 4/7/2013            Current Immunizations     13-VALENT PCV PREVNAR 8/23/2016    Influenza Vaccine Adult HD 11/27/2016, 9/17/2015    Tdap Vaccine 4/7/2013      Below and/or attached are the medications your provider expects you to take. Review all of your home medications and newly ordered medications with your provider and/or pharmacist. Follow medication instructions as directed by your provider and/or pharmacist. Please keep your medication list with you and share with your provider. Update the information when medications are discontinued, doses are changed, or new medications (including over-the-counter products) are added; and carry medication information at all times in the event of emergency situations     Allergies:  No Known Allergies          Medications  Valid as of: April 27, 2017 - 12:30 PM    Generic Name Brand Name Tablet Size Instructions for use    Atorvastatin Calcium (Tab) LIPITOR 80 MG TAKE 1 TABLET BY MOUTH EVERY EVENING        Blood Glucose Monitoring Suppl (Misc) Blood Glucose Monitoring Suppl SUPPLIES 1 Device by Does not apply route 2 times a day as needed. Test strips order: Test strips for free style lite meter        Budesonide   Inhale  by mouth.        Budesonide-Formoterol Fumarate (Aerosol) SYMBICORT 160-4.5 MCG/ACT Inhale 2 Puffs by mouth 2 Times a Day.        Clopidogrel Bisulfate (Tab) PLAVIX 75 MG Take 1 Tab by mouth every day. Taking every other day        Ipratropium-Albuterol (Aero Soln) COMBIVENT RESPIMAT  MCG/ACT Inhale 1 Puff by mouth 4 times a day.        Lisinopril (Tab) PRINIVIL, ZESTRIL 30 MG TAKE 1 TABLET BY MOUTH EVERY DAY        MetFORMIN HCl (Tab) GLUCOPHAGE 500 MG TAKE 1 TABLET BY MOUTH EVERY DAY         Nortriptyline HCl (Cap) PAMELOR 10 MG Take 1 Cap by mouth every bedtime.        .                 Medicines prescribed today were sent to:     [id number]  [place], [place] - [address] AT [address]    [address] [address]    Phone: [phone] Fax: [phone]    Open 24 Hours?: No      Medication refill instructions:       If your prescription bottle indicates you have medication refills left, it is not necessary to call your provider’s office. Please contact your pharmacy and they will refill your medication.    If your prescription bottle indicates you do not have any refills left, you may request refills at any time through one of the following ways: The online [facility] system (except Urgent Care), by calling your provider’s office, or by asking your pharmacy to contact your provider’s office with a refill request. Medication refills are processed only during regular business hours and may not be available until the next business day. Your provider may request additional information or to have a follow-up visit with you prior to refilling your medication.   *Please Note: Medication refills are assigned a new Rx number when refilled electronically. Your pharmacy may indicate that no refills were authorized even though a new prescription for the same medication is available at the pharmacy. Please request the medicine by name with the pharmacy before contacting your provider for a refill.        Your To Do List     Future Labs/Procedures Complete By Expires    HEMOGLOBIN A1C (For A1C Every 6 Months Topic)  As directed 4/27/2018    Comments:    HEMOGLOBIN A1C   [unfilled]         [facility] Access Code: Activation code not generated  Current [facility] Status: Active

## 2017-05-09 RX ORDER — CLOPIDOGREL BISULFATE 75 MG/1
TABLET ORAL
Qty: 90 TAB | Refills: 3 | Status: SHIPPED | OUTPATIENT
Start: 2017-05-09 | End: 2018-05-10 | Stop reason: SDUPTHER

## 2017-05-11 ENCOUNTER — PATIENT OUTREACH (OUTPATIENT)
Dept: HEALTH INFORMATION MANAGEMENT | Facility: OTHER | Age: 72
End: 2017-05-11

## 2017-05-11 NOTE — PROGRESS NOTES
Attempt #:1    WebIZ Checked & Epic Updated: yes  HealthConnect Verified: no  Verify PCP: yes    Communication Preference Obtained: yes     Review Care Team: yes    Annual Wellness Visit Scheduling  1. Scheduling Status:Not Scheduled. Patient states they are not interested    Care Gap Scheduling      Health Maintenance Due   Topic Date Due   • Annual Wellness Visit  DECLINED   • IMM ZOSTER VACCINE  ADDED TO UPCOMING PCP VISIT IN AUGUST   • RETINAL SCREENING  RECORDS REQUESTED FROM DR. GALICIA'S OFFICE         mokono Activation: already active  mokono Jose: no  Virtual Visits: no  Opt In to Text Messages: no

## 2017-08-22 ENCOUNTER — HOSPITAL ENCOUNTER (OUTPATIENT)
Dept: LAB | Facility: MEDICAL CENTER | Age: 72
End: 2017-08-22
Attending: NURSE PRACTITIONER
Payer: MEDICARE

## 2017-08-22 DIAGNOSIS — E11.51 DM (DIABETES MELLITUS) TYPE II CONTROLLED PERIPHERAL VASCULAR DISORDER: ICD-10-CM

## 2017-08-22 LAB
EST. AVERAGE GLUCOSE BLD GHB EST-MCNC: 140 MG/DL
HBA1C MFR BLD: 6.5 % (ref 0–5.6)

## 2017-08-22 PROCEDURE — 83036 HEMOGLOBIN GLYCOSYLATED A1C: CPT | Mod: GA

## 2017-08-22 PROCEDURE — 36415 COLL VENOUS BLD VENIPUNCTURE: CPT | Mod: GA

## 2017-08-30 ENCOUNTER — OFFICE VISIT (OUTPATIENT)
Dept: MEDICAL GROUP | Facility: MEDICAL CENTER | Age: 72
End: 2017-08-30
Payer: MEDICARE

## 2017-08-30 VITALS
HEIGHT: 72 IN | WEIGHT: 189 LBS | BODY MASS INDEX: 25.6 KG/M2 | TEMPERATURE: 97.5 F | SYSTOLIC BLOOD PRESSURE: 128 MMHG | HEART RATE: 53 BPM | OXYGEN SATURATION: 95 % | DIASTOLIC BLOOD PRESSURE: 68 MMHG

## 2017-08-30 DIAGNOSIS — E11.51 DM (DIABETES MELLITUS) TYPE II CONTROLLED PERIPHERAL VASCULAR DISORDER: ICD-10-CM

## 2017-08-30 PROCEDURE — 99214 OFFICE O/P EST MOD 30 MIN: CPT | Performed by: NURSE PRACTITIONER

## 2017-08-30 NOTE — PROGRESS NOTES
Subjective:      Mello Isabel is a 72 y.o. male who presents with Follow-Up (review labs)            HPI  Seen in f/u for DM.  He is feeling well.    Bp well controlled.   He has seen his eye dr this year.  Had some eye surgery to dec pressure on his eyes this year.  It was laser surgery.   Reviewed lab with pt.  a1c is down to 6.5 from 6.7.    He is trying to follow a healthy diet.  Cheating some.  Remains very active.    Patient Active Problem List    Diagnosis Date Noted   • Allergy to pollen    • Asthma, mild persistent    • DM (diabetes mellitus) type II controlled peripheral vascular disorder (CMS-HCC)    • TIA (transient ischemic attack)    • Essential hypertension    • Hyperlipidemia LDL goal <70      Current Outpatient Prescriptions   Medication Sig Dispense Refill   • lisinopril (PRINIVIL, ZESTRIL) 30 MG tablet Take 1 Tab by mouth every day. 90 Tab 0   • metformin (GLUCOPHAGE) 500 MG Tab Take 1 Tab by mouth every day. 90 Tab 0   • atorvastatin (LIPITOR) 80 MG tablet Take 1 Tab by mouth every day. 90 Tab 1   • clopidogrel (PLAVIX) 75 MG Tab TAKE 1 TABLET BY MOUTH EVERY DAY AS DIRECTED 90 Tab 3   • budesonide-formoterol (SYMBICORT) 160-4.5 MCG/ACT Aerosol Inhale 2 Puffs by mouth 2 Times a Day. 3 Inhaler 1   • ipratropium-albuterol (COMBIVENT RESPIMAT)  MCG/ACT Aero Soln Inhale 1 Puff by mouth 4 times a day. 1 Inhaler 3   • Blood Glucose Monitoring Suppl SUPPLIES Misc 1 Device by Does not apply route 2 times a day as needed. Test strips order: Test strips for free style lite meter 180 Device 3   • Budesonide (PULMICORT INH) Inhale  by mouth.       No current facility-administered medications for this visit.      No Known Allergies      ROS    Review of Systems   Constitutional: Negative.  Negative for fever, chills, weight loss, malaise/fatigue and diaphoresis.   HENT: Negative.  Negative for hearing loss, ear pain, nosebleeds, congestion, sore throat, neck pain, tinnitus and ear discharge.     Respiratory: Negative.  Negative for cough, hemoptysis, sputum production, shortness of breath, wheezing and stridor.    Cardiovascular: Negative.  Negative for chest pain, palpitations, orthopnea, claudication, leg swelling and PND.   Gastrointestinal: denies nausea, vomiting, diarrhea, constipation, heartburn, melena or hematochezia.  Genitourinary: Denies dysuria, hematuria, urinary incontinence, frequency or urgency.         Objective:     /68   Pulse (!) 53   Temp 36.4 °C (97.5 °F)   Ht 1.829 m (6')   Wt 85.7 kg (189 lb)   SpO2 95%   BMI 25.63 kg/m²      Physical Exam       Physical Exam   Vitals reviewed.  Constitutional: oriented to person, place, and time. appears well-developed and well-nourished. No distress.   Cardiovascular: Normal rate, regular rhythm, normal heart sounds and intact distal pulses.  Exam reveals no gallop and no friction rub.  No murmur heard.  No carotid bruits.   Pulmonary/Chest: Effort normal and breath sounds normal. No stridor. No respiratory distress. no wheezes or rales. exhibits no tenderness.   Musculoskeletal: Normal range of motion. exhibits no edema. gilbert pedal pulses 2+.  Neurological: alert and oriented to person, place, and time. exhibits normal muscle tone. Coordination normal.   Skin: Skin is warm and dry. no diaphoresis.   Psychiatric: normal mood and affect. behavior is normal.          Assessment/Plan:     1. DM (diabetes mellitus) type II controlled peripheral vascular disorder (CMS-[facility])  HEMOGLOBIN A1C    stable on meds.  recheck lab in 3 months.  f/u for review

## 2017-09-24 RX ORDER — LISINOPRIL 30 MG/1
TABLET ORAL
Qty: 90 TAB | Refills: 0 | Status: SHIPPED | OUTPATIENT
Start: 2017-09-24 | End: 2017-12-30 | Stop reason: SDUPTHER

## 2017-10-19 RX ORDER — BUDESONIDE AND FORMOTEROL FUMARATE DIHYDRATE 160; 4.5 UG/1; UG/1
2 AEROSOL RESPIRATORY (INHALATION) 2 TIMES DAILY
Qty: 3 INHALER | Refills: 1 | Status: SHIPPED | OUTPATIENT
Start: 2017-10-19 | End: 2017-11-29

## 2017-11-03 ENCOUNTER — TELEPHONE (OUTPATIENT)
Dept: MEDICAL GROUP | Facility: MEDICAL CENTER | Age: 72
End: 2017-11-03

## 2017-11-03 NOTE — TELEPHONE ENCOUNTER
Left message for patient to call back at [phone].  Please inform pt the insurance had denied Symbicort, they want him to try Dulera, advair, or breo first

## 2017-11-17 NOTE — TELEPHONE ENCOUNTER
Pt states he has not tried one of the 3 medications but is willing to try one. Do you mind ordering one he can try please

## 2017-11-20 ENCOUNTER — HOSPITAL ENCOUNTER (OUTPATIENT)
Dept: LAB | Facility: MEDICAL CENTER | Age: 72
End: 2017-11-20
Attending: NURSE PRACTITIONER
Payer: MEDICARE

## 2017-11-20 DIAGNOSIS — E11.51 DM (DIABETES MELLITUS) TYPE II CONTROLLED PERIPHERAL VASCULAR DISORDER: ICD-10-CM

## 2017-11-20 LAB
EST. AVERAGE GLUCOSE BLD GHB EST-MCNC: 128 MG/DL
HBA1C MFR BLD: 6.1 % (ref 0–5.6)

## 2017-11-20 PROCEDURE — 83036 HEMOGLOBIN GLYCOSYLATED A1C: CPT

## 2017-11-20 PROCEDURE — 36415 COLL VENOUS BLD VENIPUNCTURE: CPT

## 2017-11-27 RX ORDER — ATORVASTATIN CALCIUM 80 MG/1
TABLET, FILM COATED ORAL
Qty: 90 TAB | Refills: 0 | Status: SHIPPED | OUTPATIENT
Start: 2017-11-27 | End: 2018-02-15 | Stop reason: SDUPTHER

## 2017-11-29 ENCOUNTER — OFFICE VISIT (OUTPATIENT)
Dept: MEDICAL GROUP | Facility: MEDICAL CENTER | Age: 72
End: 2017-11-29
Payer: MEDICARE

## 2017-11-29 VITALS
BODY MASS INDEX: 25.33 KG/M2 | SYSTOLIC BLOOD PRESSURE: 132 MMHG | HEART RATE: 56 BPM | OXYGEN SATURATION: 97 % | RESPIRATION RATE: 16 BRPM | TEMPERATURE: 97.8 F | DIASTOLIC BLOOD PRESSURE: 80 MMHG | WEIGHT: 187 LBS | HEIGHT: 72 IN

## 2017-11-29 DIAGNOSIS — Z12.5 SCREENING FOR MALIGNANT NEOPLASM OF PROSTATE: ICD-10-CM

## 2017-11-29 DIAGNOSIS — R41.3 MEMORY LOSS: ICD-10-CM

## 2017-11-29 DIAGNOSIS — E11.51 DM (DIABETES MELLITUS) TYPE II CONTROLLED PERIPHERAL VASCULAR DISORDER: ICD-10-CM

## 2017-11-29 DIAGNOSIS — E55.9 VITAMIN D DEFICIENCY: ICD-10-CM

## 2017-11-29 DIAGNOSIS — E78.5 HYPERLIPIDEMIA LDL GOAL <70: ICD-10-CM

## 2017-11-29 PROCEDURE — 99214 OFFICE O/P EST MOD 30 MIN: CPT | Performed by: NURSE PRACTITIONER

## 2017-11-29 ASSESSMENT — PATIENT HEALTH QUESTIONNAIRE - PHQ9: CLINICAL INTERPRETATION OF PHQ2 SCORE: 0

## 2017-11-29 NOTE — PROGRESS NOTES
Subjective:     Mello Isabel is a 72 y.o. male who presents with   Chief Complaint   Patient presents with   • Follow-Up   • Results   .    HPI:   Seen in f/u for DM.  Feeling well.    He went to get new hearing aides.    His wife has noted that he is loosing his memory.  He used to be bad at remembering names.  Now he is forgetting times and appts.     He has been on a healthy diet.  He is not exercising but doing a lot of work.  Not able to exercise d/t his left shoulder injury.  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  They do exercise with walking some.    Reviewed lab with pt.  His a1c is down from 6.5 to 6.1.  migel metformin well.      Patient Active Problem List    Diagnosis Date Noted   • Allergy to pollen    • Asthma, mild persistent    • DM (diabetes mellitus) type II controlled peripheral vascular disorder (CMS-HCC)    • TIA (transient ischemic attack)    • Essential hypertension    • Hyperlipidemia LDL goal <70        Current medicines (including changes today)  Current Outpatient Prescriptions   Medication Sig Dispense Refill   • atorvastatin (LIPITOR) 80 MG tablet TAKE ONE TABLET BY MOUTH ONCE DAILY 90 Tab 0   • fluticasone-salmeterol (ADVAIR DISKUS) 250-50 MCG/DOSE AEROSOL POWDER, BREATH ACTIVATED Inhale 1 Puff by mouth every 12 hours. 1 Inhaler 2   • lisinopril (PRINIVIL, ZESTRIL) 30 MG tablet TAKE 1 TABLET BY MOUTH EVERY DAY 90 Tab 0   • metformin (GLUCOPHAGE) 500 MG Tab TAKE 1 TABLET BY MOUTH EVERY DAY 90 Tab 1   • clopidogrel (PLAVIX) 75 MG Tab TAKE 1 TABLET BY MOUTH EVERY DAY AS DIRECTED 90 Tab 3   • ipratropium-albuterol (COMBIVENT RESPIMAT)  MCG/ACT Aero Soln Inhale 1 Puff by mouth 4 times a day. 1 Inhaler 3   • Blood Glucose Monitoring Suppl SUPPLIES Misc 1 Device by Does not apply route 2 times a day as needed. Test strips order: Test strips for free style lite meter 180 Device 3   • Budesonide (PULMICORT INH) Inhale  by mouth.       No current facility-administered medications for this  "visit.        No Known Allergies    ROS  Constitutional: Negative. Negative for fever, chills, weight loss, malaise/fatigue and diaphoresis.   HENT: Negative. Negative for hearing loss, ear pain, nosebleeds, congestion, sore throat, neck pain, tinnitus and ear discharge.   Respiratory: Negative. Negative for cough, hemoptysis, sputum production, shortness of breath, wheezing and stridor.   Cardiovascular: Negative. Negative for chest pain, palpitations, orthopnea, claudication, leg swelling and PND.   Gastrointestinal: Denies nausea, vomiting, diarrhea, constipation, heartburn, melena or hematochezia.  Genitourinary: Denies dysuria, hematuria, urinary incontinence, frequency or urgency.        Objective:     Blood pressure 132/80, pulse (!) 56, temperature 36.6 °C (97.8 °F), resp. rate 16, height 1.829 m (6' 0.01\"), weight 84.8 kg (187 lb), SpO2 97 %. Body mass index is 25.36 kg/m².    Physical Exam:  Vitals reviewed.  Constitutional: Oriented to person, place, and time. appears well-developed and well-nourished. No distress.   Cardiovascular: Normal rate, regular rhythm, normal heart sounds and intact distal pulses. Exam reveals no gallop and no friction rub. No murmur heard. No carotid bruits.   Pulmonary/Chest: Effort normal and breath sounds normal. No stridor. No respiratory distress. no wheezes or rales. exhibits no tenderness.   Musculoskeletal: Normal range of motion. exhibits no edema. gilbert pedal pulses 2+.  Lymphadenopathy: No cervical or supraclavicular adenopathy.   Neurological: Alert and oriented to person, place, and time. exhibits normal muscle tone.  Skin: Skin is warm and dry. No diaphoresis.   Psychiatric: Normal mood and affect. Behavior is normal.      Assessment and Plan:     The following treatment plan was discussed:    1. DM (diabetes mellitus) type II controlled peripheral vascular disorder (CMS-HCC)  COMP METABOLIC PANEL    HEMOGLOBIN A1C    MICROALBUMIN CREAT RATIO URINE    check a1c in 4 " months. n ow well controlled on metformin and diet.     2. Memory loss  COMP METABOLIC PANEL    TSH    VITAMIN D,25 HYDROXY    VITAMIN B12    FOLATE    check b12, tsh, folate with lab in 4 months.  f/u for review   3. Screening for malignant neoplasm of prostate  PROSTATE SPECIFIC AG SCREENING    check PSA with next lab.    4. Vitamin D deficiency  VITAMIN D,25 HYDROXY    due for recheck lab in april.  do lab and f/u for review   5. Hyperlipidemia LDL goal <70  COMP METABOLIC PANEL    LIPID PROFILE         Followup: Return in about 4 months (around 3/29/2018).

## 2018-01-02 RX ORDER — LISINOPRIL 30 MG/1
TABLET ORAL
Qty: 90 TAB | Refills: 0 | Status: SHIPPED | OUTPATIENT
Start: 2018-01-02 | End: 2018-03-30 | Stop reason: SDUPTHER

## 2018-02-15 RX ORDER — ATORVASTATIN CALCIUM 80 MG/1
TABLET, FILM COATED ORAL
Qty: 90 TAB | Refills: 3 | Status: SHIPPED | OUTPATIENT
Start: 2018-02-15 | End: 2023-04-05 | Stop reason: SDUPTHER

## 2018-03-30 RX ORDER — LISINOPRIL 30 MG/1
TABLET ORAL
Qty: 90 TAB | Refills: 0 | Status: SHIPPED | OUTPATIENT
Start: 2018-03-30 | End: 2018-06-30 | Stop reason: SDUPTHER

## 2018-04-18 ENCOUNTER — HOSPITAL ENCOUNTER (OUTPATIENT)
Dept: LAB | Facility: MEDICAL CENTER | Age: 73
End: 2018-04-18
Attending: NURSE PRACTITIONER
Payer: MEDICARE

## 2018-04-18 DIAGNOSIS — E78.5 HYPERLIPIDEMIA LDL GOAL <70: ICD-10-CM

## 2018-04-18 DIAGNOSIS — E11.51 DM (DIABETES MELLITUS) TYPE II CONTROLLED PERIPHERAL VASCULAR DISORDER: ICD-10-CM

## 2018-04-18 DIAGNOSIS — Z12.5 SCREENING FOR MALIGNANT NEOPLASM OF PROSTATE: ICD-10-CM

## 2018-04-18 DIAGNOSIS — R41.3 MEMORY LOSS: ICD-10-CM

## 2018-04-18 DIAGNOSIS — E55.9 VITAMIN D DEFICIENCY: ICD-10-CM

## 2018-04-18 LAB
25(OH)D3 SERPL-MCNC: 56 NG/ML (ref 30–100)
ALBUMIN SERPL BCP-MCNC: 4 G/DL (ref 3.2–4.9)
ALBUMIN/GLOB SERPL: 1.7 G/DL
ALP SERPL-CCNC: 59 U/L (ref 30–99)
ALT SERPL-CCNC: 20 U/L (ref 2–50)
ANION GAP SERPL CALC-SCNC: 4 MMOL/L (ref 0–11.9)
AST SERPL-CCNC: 20 U/L (ref 12–45)
BILIRUB SERPL-MCNC: 0.5 MG/DL (ref 0.1–1.5)
BUN SERPL-MCNC: 26 MG/DL (ref 8–22)
CALCIUM SERPL-MCNC: 9.3 MG/DL (ref 8.5–10.5)
CHLORIDE SERPL-SCNC: 107 MMOL/L (ref 96–112)
CHOLEST SERPL-MCNC: 139 MG/DL (ref 100–199)
CO2 SERPL-SCNC: 28 MMOL/L (ref 20–33)
CREAT SERPL-MCNC: 1.05 MG/DL (ref 0.5–1.4)
CREAT UR-MCNC: 55.5 MG/DL
EST. AVERAGE GLUCOSE BLD GHB EST-MCNC: 137 MG/DL
FOLATE SERPL-MCNC: >23.5 NG/ML
GLOBULIN SER CALC-MCNC: 2.4 G/DL (ref 1.9–3.5)
GLUCOSE SERPL-MCNC: 96 MG/DL (ref 65–99)
HBA1C MFR BLD: 6.4 % (ref 0–5.6)
HDLC SERPL-MCNC: 49 MG/DL
LDLC SERPL CALC-MCNC: 67 MG/DL
MICROALBUMIN UR-MCNC: 1.3 MG/DL
MICROALBUMIN/CREAT UR: 23 MG/G (ref 0–30)
POTASSIUM SERPL-SCNC: 4.4 MMOL/L (ref 3.6–5.5)
PROT SERPL-MCNC: 6.4 G/DL (ref 6–8.2)
PSA SERPL-MCNC: 1.27 NG/ML (ref 0–4)
SODIUM SERPL-SCNC: 139 MMOL/L (ref 135–145)
TRIGL SERPL-MCNC: 116 MG/DL (ref 0–149)
TSH SERPL DL<=0.005 MIU/L-ACNC: 2.01 UIU/ML (ref 0.38–5.33)
VIT B12 SERPL-MCNC: 1234 PG/ML (ref 211–911)

## 2018-04-18 PROCEDURE — 82746 ASSAY OF FOLIC ACID SERUM: CPT

## 2018-04-18 PROCEDURE — 82570 ASSAY OF URINE CREATININE: CPT

## 2018-04-18 PROCEDURE — 80061 LIPID PANEL: CPT

## 2018-04-18 PROCEDURE — 84153 ASSAY OF PSA TOTAL: CPT | Mod: GA

## 2018-04-18 PROCEDURE — 82306 VITAMIN D 25 HYDROXY: CPT

## 2018-04-18 PROCEDURE — 80053 COMPREHEN METABOLIC PANEL: CPT

## 2018-04-18 PROCEDURE — 82607 VITAMIN B-12: CPT

## 2018-04-18 PROCEDURE — 36415 COLL VENOUS BLD VENIPUNCTURE: CPT

## 2018-04-18 PROCEDURE — 84443 ASSAY THYROID STIM HORMONE: CPT

## 2018-04-18 PROCEDURE — 83036 HEMOGLOBIN GLYCOSYLATED A1C: CPT | Mod: GA

## 2018-04-18 PROCEDURE — 82043 UR ALBUMIN QUANTITATIVE: CPT

## 2018-04-25 ENCOUNTER — OFFICE VISIT (OUTPATIENT)
Dept: MEDICAL GROUP | Facility: MEDICAL CENTER | Age: 73
End: 2018-04-25
Payer: MEDICARE

## 2018-04-25 VITALS
WEIGHT: 185 LBS | SYSTOLIC BLOOD PRESSURE: 132 MMHG | TEMPERATURE: 99.4 F | HEIGHT: 72 IN | DIASTOLIC BLOOD PRESSURE: 64 MMHG | BODY MASS INDEX: 25.06 KG/M2 | HEART RATE: 71 BPM | OXYGEN SATURATION: 94 %

## 2018-04-25 DIAGNOSIS — Z23 NEED FOR PNEUMOCOCCAL VACCINATION: ICD-10-CM

## 2018-04-25 DIAGNOSIS — E11.51 DM (DIABETES MELLITUS) TYPE II CONTROLLED PERIPHERAL VASCULAR DISORDER: ICD-10-CM

## 2018-04-25 PROCEDURE — 90732 PPSV23 VACC 2 YRS+ SUBQ/IM: CPT | Performed by: NURSE PRACTITIONER

## 2018-04-25 PROCEDURE — 99214 OFFICE O/P EST MOD 30 MIN: CPT | Mod: 25 | Performed by: NURSE PRACTITIONER

## 2018-04-25 PROCEDURE — G0009 ADMIN PNEUMOCOCCAL VACCINE: HCPCS | Performed by: NURSE PRACTITIONER

## 2018-04-25 NOTE — PROGRESS NOTES
Subjective:     Mello Isabel is a 72 y.o. male who presents with DM.    HPI:   Seen in f/u for DM.  He is feeling well. He has been vacationing in [place] in march and april. Was not michael healty diet during that time.  He is back to being more active.  Improving his diet.  Reviewed lab withpt.  His GFR, PSA, folate, B12, D, TSH, LP, CMP is wnl.  a1c is up from 6.1 to 6.4.  jprob d/t poor diet in [place]  Alb/cr ratio is up from 0 last year to 23.  Bp is well controlled.    He is due ppv 23      Patient Active Problem List    Diagnosis Date Noted   • Allergy to pollen    • Asthma, mild persistent    • DM (diabetes mellitus) type II controlled peripheral vascular disorder (CMS-HCC)    • TIA (transient ischemic attack)    • Essential hypertension    • Hyperlipidemia LDL goal <70        Current medicines (including changes today)  Current Outpatient Prescriptions   Medication Sig Dispense Refill   • lisinopril (PRINIVIL, ZESTRIL) 30 MG tablet TAKE 1 TABLET BY MOUTH EVERY DAY 90 Tab 0   • metFORMIN (GLUCOPHAGE) 500 MG Tab TAKE 1 TABLET BY MOUTH EVERY DAY 90 Tab 3   • atorvastatin (LIPITOR) 80 MG tablet TAKE 1 TABLET BY MOUTH EVERY DAY 90 Tab 3   • fluticasone-salmeterol (ADVAIR DISKUS) 250-50 MCG/DOSE AEROSOL POWDER, BREATH ACTIVATED Inhale 1 Puff by mouth every 12 hours. 1 Inhaler 2   • clopidogrel (PLAVIX) 75 MG Tab TAKE 1 TABLET BY MOUTH EVERY DAY AS DIRECTED 90 Tab 3   • ipratropium-albuterol (COMBIVENT RESPIMAT)  MCG/ACT Aero Soln Inhale 1 Puff by mouth 4 times a day. 1 Inhaler 3   • Blood Glucose Monitoring Suppl SUPPLIES Misc 1 Device by Does not apply route 2 times a day as needed. Test strips order: Test strips for free style lite meter 180 Device 3     No current facility-administered medications for this visit.        No Known Allergies    ROS  Constitutional: Negative. Negative for fever, chills, weight loss, malaise/fatigue and diaphoresis.   HENT: Negative. Negative for hearing loss, ear pain,  nosebleeds, congestion, sore throat, neck pain, tinnitus and ear discharge.   Respiratory: Negative. Negative for cough, hemoptysis, sputum production, shortness of breath, wheezing and stridor.   Cardiovascular: Negative. Negative for chest pain, palpitations, orthopnea, claudication, leg swelling and PND.   Gastrointestinal: Denies nausea, vomiting, diarrhea, constipation, heartburn, melena or hematochezia.  Genitourinary: Denies dysuria, hematuria, urinary incontinence, frequency or urgency.        Objective:     Blood pressure 132/64, pulse 71, temperature 37.4 °C (99.4 °F), height 1.829 m (6'), weight 83.9 kg (185 lb), SpO2 94 %. Body mass index is 25.09 kg/m².    Physical Exam:  Vitals reviewed.  Constitutional: Oriented to person, place, and time. appears well-developed and well-nourished. No distress.   Cardiovascular: Normal rate, regular rhythm, normal heart sounds and intact distal pulses. Exam reveals no gallop and no friction rub. No murmur heard. No carotid bruits.   Pulmonary/Chest: Effort normal and breath sounds normal. No stridor. No respiratory distress. no wheezes or rales. exhibits no tenderness.   Musculoskeletal: Normal range of motion. exhibits no edema. gilbert pedal pulses 2+.  Lymphadenopathy: No cervical or supraclavicular adenopathy.   Neurological: Alert and oriented to person, place, and time. exhibits normal muscle tone.  Skin: Skin is warm and dry. No diaphoresis.   Psychiatric: Normal mood and affect. Behavior is normal.      Assessment and Plan:     The following treatment plan was discussed:    1. DM (diabetes mellitus) type II controlled peripheral vascular disorder (CMS-HCC)  HEMOGLOBIN A1C    MICROALBUMIN CREAT RATIO URINE    recheck lab in 4m [facility].  improve diet and exercise.  f/u for review.   2. Need for pneumococcal vaccination  PNEUMOCOCCAL POLYSACCHARIDE VACCINE 23-VALENT =>1YO SQ/IM         Followup: Return in about 4 months (around 8/25/2018).

## 2018-07-02 RX ORDER — LISINOPRIL 30 MG/1
TABLET ORAL
Qty: 90 TAB | Refills: 1 | Status: SHIPPED | OUTPATIENT
Start: 2018-07-02 | End: 2021-10-27

## 2019-06-19 ENCOUNTER — OFFICE VISIT (OUTPATIENT)
Dept: URGENT CARE | Facility: PHYSICIAN GROUP | Age: 74
End: 2019-06-19
Payer: MEDICARE

## 2019-06-19 ENCOUNTER — HOSPITAL ENCOUNTER (OUTPATIENT)
Dept: RADIOLOGY | Facility: MEDICAL CENTER | Age: 74
End: 2019-06-19
Attending: PHYSICIAN ASSISTANT
Payer: MEDICARE

## 2019-06-19 ENCOUNTER — HOSPITAL ENCOUNTER (OUTPATIENT)
Dept: LAB | Facility: MEDICAL CENTER | Age: 74
End: 2019-06-19
Attending: PHYSICIAN ASSISTANT
Payer: MEDICARE

## 2019-06-19 VITALS
TEMPERATURE: 97.4 F | OXYGEN SATURATION: 95 % | HEART RATE: 71 BPM | DIASTOLIC BLOOD PRESSURE: 84 MMHG | HEIGHT: 72 IN | BODY MASS INDEX: 26.41 KG/M2 | SYSTOLIC BLOOD PRESSURE: 140 MMHG | WEIGHT: 195 LBS

## 2019-06-19 DIAGNOSIS — R60.9 EDEMA, UNSPECIFIED TYPE: ICD-10-CM

## 2019-06-19 DIAGNOSIS — I10 HYPERTENSION, UNSPECIFIED TYPE: ICD-10-CM

## 2019-06-19 LAB
ANION GAP SERPL CALC-SCNC: 7 MMOL/L (ref 0–11.9)
BASOPHILS # BLD AUTO: 0.8 % (ref 0–1.8)
BASOPHILS # BLD: 0.05 K/UL (ref 0–0.12)
BNP SERPL-MCNC: 20 PG/ML (ref 0–100)
BUN SERPL-MCNC: 25 MG/DL (ref 8–22)
CALCIUM SERPL-MCNC: 9.7 MG/DL (ref 8.5–10.5)
CHLORIDE SERPL-SCNC: 103 MMOL/L (ref 96–112)
CO2 SERPL-SCNC: 27 MMOL/L (ref 20–33)
CREAT SERPL-MCNC: 1.05 MG/DL (ref 0.5–1.4)
EOSINOPHIL # BLD AUTO: 0.24 K/UL (ref 0–0.51)
EOSINOPHIL NFR BLD: 3.7 % (ref 0–6.9)
ERYTHROCYTE [DISTWIDTH] IN BLOOD BY AUTOMATED COUNT: 40.7 FL (ref 35.9–50)
GLUCOSE SERPL-MCNC: 107 MG/DL (ref 65–99)
HCT VFR BLD AUTO: 41.5 % (ref 42–52)
HGB BLD-MCNC: 13.7 G/DL (ref 14–18)
IMM GRANULOCYTES # BLD AUTO: 0.01 K/UL (ref 0–0.11)
IMM GRANULOCYTES NFR BLD AUTO: 0.2 % (ref 0–0.9)
LYMPHOCYTES # BLD AUTO: 2.06 K/UL (ref 1–4.8)
LYMPHOCYTES NFR BLD: 31.7 % (ref 22–41)
MCH RBC QN AUTO: 30.4 PG (ref 27–33)
MCHC RBC AUTO-ENTMCNC: 33 G/DL (ref 33.7–35.3)
MCV RBC AUTO: 92 FL (ref 81.4–97.8)
MONOCYTES # BLD AUTO: 0.4 K/UL (ref 0–0.85)
MONOCYTES NFR BLD AUTO: 6.2 % (ref 0–13.4)
NEUTROPHILS # BLD AUTO: 3.74 K/UL (ref 1.82–7.42)
NEUTROPHILS NFR BLD: 57.4 % (ref 44–72)
NRBC # BLD AUTO: 0 K/UL
NRBC BLD-RTO: 0 /100 WBC
PLATELET # BLD AUTO: 169 K/UL (ref 164–446)
PMV BLD AUTO: 11.7 FL (ref 9–12.9)
POTASSIUM SERPL-SCNC: 4.5 MMOL/L (ref 3.6–5.5)
RBC # BLD AUTO: 4.51 M/UL (ref 4.7–6.1)
SODIUM SERPL-SCNC: 137 MMOL/L (ref 135–145)
WBC # BLD AUTO: 6.5 K/UL (ref 4.8–10.8)

## 2019-06-19 PROCEDURE — 71046 X-RAY EXAM CHEST 2 VIEWS: CPT

## 2019-06-19 PROCEDURE — 36415 COLL VENOUS BLD VENIPUNCTURE: CPT

## 2019-06-19 PROCEDURE — 85025 COMPLETE CBC W/AUTO DIFF WBC: CPT

## 2019-06-19 PROCEDURE — 99214 OFFICE O/P EST MOD 30 MIN: CPT | Performed by: PHYSICIAN ASSISTANT

## 2019-06-19 PROCEDURE — 80048 BASIC METABOLIC PNL TOTAL CA: CPT

## 2019-06-19 PROCEDURE — 83880 ASSAY OF NATRIURETIC PEPTIDE: CPT | Mod: GA

## 2019-06-19 ASSESSMENT — ENCOUNTER SYMPTOMS
EDEMA: 1
HEADACHES: 0
NECK PAIN: 0
EYE REDNESS: 0
DIARRHEA: 0
COUGH: 0
SORE THROAT: 0
FEVER: 0
VOMITING: 0
CHILLS: 0
FATIGUE: 0
EYE DISCHARGE: 0

## 2019-06-19 NOTE — PROGRESS NOTES
"Subjective:      Mello Isabel is a 74 y.o. male who presents with Rash (both ankles, swelling in feet and toes x 1 month)            Patient is a pleasant 75-year-old male who presents to urgent care with concern regarding bilateral lower extremity swelling for the last month.  Patient does update me and reports that he recently was started on amlodipine after it was noted that his blood pressure was elevated after a syncopal episode approximately 5 to 6 weeks ago.  Patient had a full work-up at [facility] of which he had a \"full work-up that was normal \".  He reports that he also had a Holter monitor study which was also normal.  Since then he was started on the amlodipine and was encouraged to continue on his lisinopril.  Since then he has been feeling \"fine\".  He denies any shortness of breath, new cough, orthopnea or history of CHF.  He also denies any new recent illness.  Patient is diabetic of which he takes metformin for which he reports 3 weeks ago he had \"normal labs \"with his PCP Dr. Shahid.      Edema   This is a new problem. The current episode started more than 1 month ago. The problem occurs constantly. The problem has been waxing and waning. Associated symptoms include a rash. Pertinent negatives include no chills, congestion, coughing, fatigue, fever, headaches, neck pain, sore throat or vomiting. Associated symptoms comments: Bilateral ankle discoloration.   . Nothing aggravates the symptoms. He has tried nothing for the symptoms.       Review of Systems   Constitutional: Negative for chills, fatigue and fever.   HENT: Negative for congestion and sore throat.    Eyes: Negative for discharge and redness.   Respiratory: Negative for cough.    Cardiovascular: Positive for leg swelling.   Gastrointestinal: Negative for diarrhea and vomiting.   Musculoskeletal: Negative for neck pain.   Skin: Positive for rash.        Skin changes to his lower ankles.      Neurological: Negative for " headaches.   All other systems reviewed and are negative.         Objective:     /84   Pulse 71   Temp 36.3 °C (97.4 °F) (Temporal)   Ht 1.829 m (6')   Wt 88.5 kg (195 lb)   SpO2 95%   BMI 26.45 kg/m²    PMH:  has a past medical history of Allergy to pollen; Asthma, mild persistent; DM (diabetes mellitus) type II controlled peripheral vascular disorder; Essential hypertension; Hyperlipidemia LDL goal <70; and TIA (transient ischemic attack) (2012). He also has no past medical history of Encounter for long-term (current) use of other medications.  MEDS:   Current Outpatient Prescriptions:   •  COMBIVENT RESPIMAT  MCG/ACT Aero Soln, INHALE 1 PUFF BY MOUTH FOUR TIMES DAILY, Disp: 4 g, Rfl: 3  •  lisinopril (PRINIVIL, ZESTRIL) 30 MG tablet, TAKE 1 TABLET BY MOUTH EVERY DAY, Disp: 90 Tab, Rfl: 1  •  metFORMIN (GLUCOPHAGE) 500 MG Tab, TAKE 1 TABLET BY MOUTH EVERY DAY, Disp: 90 Tab, Rfl: 3  •  atorvastatin (LIPITOR) 80 MG tablet, TAKE 1 TABLET BY MOUTH EVERY DAY, Disp: 90 Tab, Rfl: 3  •  fluticasone-salmeterol (ADVAIR DISKUS) 250-50 MCG/DOSE AEROSOL POWDER, BREATH ACTIVATED, Inhale 1 Puff by mouth every 12 hours., Disp: 1 Inhaler, Rfl: 2  •  Blood Glucose Monitoring Suppl SUPPLIES Misc, 1 Device by Does not apply route 2 times a day as needed. Test strips order: Test strips for free style lite meter, Disp: 180 Device, Rfl: 3  ALLERGIES: No Known Allergies  SURGHX:   Past Surgical History:   Procedure Laterality Date   • CATARACT PHACO WITH IOL      gilbert eyes   • KNEE ARTHROSCOPY      x2 left   • RETINAL DETACHMENT REPAIR      gilbert eyes     SOCHX:  reports that he has quit smoking. He has never used smokeless tobacco. He reports that he does not drink alcohol or use drugs.  FH: Family history was reviewed, no pertinent findings to report    Physical Exam   Constitutional: He is oriented to person, place, and time. He appears well-developed and well-nourished. No distress.   HENT:   Head: Normocephalic and  atraumatic.   Right Ear: External ear normal.   Left Ear: External ear normal.   Mouth/Throat: Oropharynx is clear and moist. No oropharyngeal exudate.   Eyes: Pupils are equal, round, and reactive to light. Conjunctivae and EOM are normal.   Neck: Normal range of motion. Neck supple. No tracheal deviation present.   Cardiovascular: Normal rate and regular rhythm.    Pulmonary/Chest: Effort normal and breath sounds normal. No respiratory distress.   Musculoskeletal: Normal range of motion. He exhibits edema.   Neurological: He is alert and oriented to person, place, and time. Coordination normal.   Skin: Skin is warm. Rash noted.        1-2+ pitting edema to BLE.    Psychiatric: He has a normal mood and affect. His behavior is normal. Judgment and thought content normal.   Vitals reviewed.              Assessment/Plan:     1. Edema, unspecified type  - DX-CHEST-2 VIEWS; Future  - CBC WITH DIFFERENTIAL; Future  - Basic Metabolic Panel; Future  - BTYPE NATRIURETIC PEPTIDE; Future    2. Hypertension, unspecified type  - DX-CHEST-2 VIEWS; Future  - Basic Metabolic Panel; Future  - BTYPE NATRIURETIC PEPTIDE; Future    Patient without orthopnea, PND, new cough etc. that would indicate CHF.  As patient has bilateral pitting edema will order the above labs and chest x-ray to ensure patient is not with pulmonary changes.  We will follow-up with this patient via my chart for his records.  Also suspect this may be due to amlodipine as patient was recently started on this prior to lower extremity edema.  Patient is agreeable at this time and I will follow-up with him as results return.  Patient given precautionary s/sx that mandate immediate follow up and evaluation in the ED. Advised of risks of not doing so.    DDX, Supportive care, and indications for immediate follow-up discussed with patient.    Instructed to return to clinic or nearest emergency department if we are not available for any change in condition, further  concerns, or worsening of symptoms.    The patient demonstrated a good understanding and agreed with the treatment plan.  Please note that this dictation was created using voice recognition software. I have made every reasonable attempt to correct obvious errors, but I expect that there are errors of grammar and possibly content that I did not discover before finalizing the note.

## 2020-09-24 ENCOUNTER — OFFICE VISIT (OUTPATIENT)
Dept: URGENT CARE | Facility: PHYSICIAN GROUP | Age: 75
End: 2020-09-24
Payer: MEDICARE

## 2020-09-24 ENCOUNTER — APPOINTMENT (OUTPATIENT)
Dept: RADIOLOGY | Facility: MEDICAL CENTER | Age: 75
End: 2020-09-24
Attending: PHYSICIAN ASSISTANT
Payer: MEDICARE

## 2020-09-24 VITALS
HEART RATE: 67 BPM | SYSTOLIC BLOOD PRESSURE: 128 MMHG | RESPIRATION RATE: 20 BRPM | HEIGHT: 73 IN | WEIGHT: 185 LBS | BODY MASS INDEX: 24.52 KG/M2 | TEMPERATURE: 98.5 F | OXYGEN SATURATION: 97 % | DIASTOLIC BLOOD PRESSURE: 60 MMHG

## 2020-09-24 DIAGNOSIS — R06.02 SHORTNESS OF BREATH: ICD-10-CM

## 2020-09-24 DIAGNOSIS — R07.9 CHEST PAIN, UNSPECIFIED TYPE: ICD-10-CM

## 2020-09-24 DIAGNOSIS — Z87.09 HISTORY OF ASTHMA: ICD-10-CM

## 2020-09-24 PROCEDURE — 99214 OFFICE O/P EST MOD 30 MIN: CPT | Performed by: PHYSICIAN ASSISTANT

## 2020-09-24 RX ORDER — AMLODIPINE BESYLATE 10 MG/1
10 TABLET ORAL DAILY
COMMUNITY
Start: 2020-08-23 | End: 2021-12-28

## 2020-09-24 RX ORDER — LISINOPRIL 40 MG/1
40 TABLET ORAL DAILY
COMMUNITY
Start: 2020-08-23 | End: 2023-01-16

## 2020-09-24 ASSESSMENT — ENCOUNTER SYMPTOMS
HEMOPTYSIS: 0
DIZZINESS: 0
WHEEZING: 1
SENSORY CHANGE: 0
GASTROINTESTINAL NEGATIVE: 1
COUGH: 0
PALPITATIONS: 0
SORE THROAT: 1
HEADACHES: 0
EYES NEGATIVE: 1
FEVER: 0
CHILLS: 0
SHORTNESS OF BREATH: 1

## 2020-09-24 NOTE — PROGRESS NOTES
Subjective:   Mello Isabel is a 75 y.o. male who presents for Chest Pain (chest tightness, x1 day, Hx of asthma)      HPI  Patient is a 75-year-old male with a history of asthma presents with chest pain and shortness of breath onset last night.  He reports one incidence of chest pain located to the left chest with associated shortness of breath last night that lasted 5 to 10 minutes.  He reports it was worse with physical activity and resolved with rest.  He also took his inhaler that resolved the symptoms.  He has had a mild intermittent dry cough.  Mild sore throat this morning better with coffee.  Mild intermittent wheezing this morning.  Denies any current chest pain.  Denies any current shortness of breath.  Denies any fevers, chills, dizziness, lightheadedness, leg swelling.  History of diabetes mellitus type 2, TIA, essential hypertension, and hyperlipidemia.    Currently denies any symptoms.      Review of Systems   Constitutional: Negative for chills, fever and malaise/fatigue.   HENT: Positive for sore throat.    Eyes: Negative.    Respiratory: Positive for shortness of breath and wheezing. Negative for cough and hemoptysis.    Cardiovascular: Positive for chest pain. Negative for palpitations and leg swelling.   Gastrointestinal: Negative.    Genitourinary: Negative.    Skin: Negative.    Neurological: Negative for dizziness, sensory change and headaches.       Medications:    • amLODIPine Tabs  • atorvastatin  • Blood Glucose Test Strips  • Combivent Respimat Aers  • fluticasone-salmeterol Aepb  • lisinopril  • lisinopril Tabs  • metFORMIN Tabs    Allergies: Patient has no known allergies.    Problem List: Mello Isabel has Allergy to pollen; Asthma, mild persistent; DM (diabetes mellitus) type II controlled peripheral vascular disorder; TIA (transient ischemic attack); Essential hypertension; and Hyperlipidemia LDL goal <70 on their problem list.    Surgical History:  Past Surgical History:   Procedure  "Laterality Date   • CATARACT PHACO WITH IOL      gilbert eyes   • KNEE ARTHROSCOPY      x2 left   • RETINAL DETACHMENT REPAIR      gilbert eyes       Past Social Hx: Mello Isabel  reports that he has quit smoking. He has never used smokeless tobacco. He reports that he does not drink alcohol or use drugs.     Past Family Hx:  Mello Isabel family history includes Cancer in his mother; Diabetes in his brother; Hyperlipidemia in his brother and brother; Hypertension in his brother and brother; Stroke in his father.     Problem list, medications, and allergies reviewed by myself today in Epic.     Objective:     /60   Pulse 67   Temp 36.9 °C (98.5 °F) (Temporal)   Resp 20   Ht 1.854 m (6' 1\")   Wt 83.9 kg (185 lb)   SpO2 97%   BMI 24.41 kg/m²     Physical Exam  Vitals signs reviewed.   Constitutional:       General: He is not in acute distress.     Appearance: Normal appearance. He is not ill-appearing or toxic-appearing.   HENT:      Right Ear: Tympanic membrane normal.      Left Ear: Tympanic membrane normal.      Nose: Nose normal.      Mouth/Throat:      Mouth: Mucous membranes are moist.      Pharynx: Oropharynx is clear. No oropharyngeal exudate or posterior oropharyngeal erythema.   Eyes:      Extraocular Movements: Extraocular movements intact.      Conjunctiva/sclera: Conjunctivae normal.      Pupils: Pupils are equal, round, and reactive to light.   Neck:      Musculoskeletal: Neck supple.   Cardiovascular:      Rate and Rhythm: Normal rate and regular rhythm.   Pulmonary:      Effort: Pulmonary effort is normal. No respiratory distress.      Breath sounds: Normal breath sounds. No wheezing, rhonchi or rales.   Musculoskeletal:      Right lower leg: No edema.      Left lower leg: No edema.   Lymphadenopathy:      Cervical: No cervical adenopathy.   Skin:     General: Skin is warm and dry.   Neurological:      General: No focal deficit present.      Mental Status: He is alert and oriented to person, " place, and time.   Psychiatric:         Mood and Affect: Mood normal.         Behavior: Behavior normal.         EKG Interpretation:  Interpreted by me    Rhythm:  Normal sinus rhythm   Rate: 60  Axis: normal  Ectopy: none  ST Segments: no acute change  T Waves: no acute change  Q Waves: none  Clinical Impression: RBBB     Assessment/Plan:     Diagnosis and associated orders:     1. Chest pain, unspecified type  EKG - Clinic Performed    CANCELED: DX-CHEST-2 VIEWS   2. Shortness of breath  EKG - Clinic Performed    CANCELED: DX-CHEST-2 VIEWS   3. History of asthma  CANCELED: DX-CHEST-2 VIEWS      Comments/MDM:     • EKG shows right bundle branch block.  Nonspecific findings.   • Due to patient's symptoms and history of intermittent chest pain and shortness of breath with exertion, I highly recommend he present to the emergency room for further higher level of evaluation and care.   • Concern for in possible MI, PE, CHF.    • Patient understood and agreed to plan of care.   • He is otherwise asymptomatic, benign examination, normal vital signs.  Is very well-appearing.  Stable for discharge.  He declined EMS transfer.          Please note that this dictation was created using voice recognition software. I have made a reasonable attempt to correct obvious errors, but I expect that there are errors of grammar and possibly content that I did not discover before finalizing the note.    This note was electronically signed by Denis Luna PA-C

## 2020-12-14 ENCOUNTER — OFFICE VISIT (OUTPATIENT)
Dept: URGENT CARE | Facility: PHYSICIAN GROUP | Age: 75
End: 2020-12-14
Payer: MEDICARE

## 2020-12-14 VITALS
SYSTOLIC BLOOD PRESSURE: 106 MMHG | WEIGHT: 198 LBS | TEMPERATURE: 97.1 F | BODY MASS INDEX: 26.24 KG/M2 | HEIGHT: 73 IN | RESPIRATION RATE: 14 BRPM | HEART RATE: 67 BPM | OXYGEN SATURATION: 97 % | DIASTOLIC BLOOD PRESSURE: 64 MMHG

## 2020-12-14 DIAGNOSIS — H53.8 BLURRED VISION, RIGHT EYE: ICD-10-CM

## 2020-12-14 DIAGNOSIS — E11.51 DM (DIABETES MELLITUS) TYPE II, CONTROLLED, WITH PERIPHERAL VASCULAR DISORDER (HCC): ICD-10-CM

## 2020-12-14 PROCEDURE — 99213 OFFICE O/P EST LOW 20 MIN: CPT | Performed by: NURSE PRACTITIONER

## 2020-12-14 ASSESSMENT — ENCOUNTER SYMPTOMS
HEADACHES: 0
DIZZINESS: 0
EYE PAIN: 0
FEVER: 0
BLURRED VISION: 0
EYE DISCHARGE: 0
DOUBLE VISION: 0
EYE REDNESS: 0
CHILLS: 0
NAUSEA: 0
PHOTOPHOBIA: 0

## 2020-12-14 NOTE — PROGRESS NOTES
Subjective:      Mello Isabel is a 75 y.o. male who presents with Blurred Vision (R eye blurred [id number])            HPI New. 75 year old gentleman with absence of vision in right eye x 2 hours this morning. He denies headache, eye pain, discharge or redness of eye. He has since returned to baseline status but has history of retinal detachment in past that started like this. He denies any trauma or nausea with this. No treatment done, could not get into regular eye doctor. PMH relevant for type 2 diabetes, well controlled per last A1C.    Patient has no known allergies.  Current Outpatient Medications on File Prior to Visit   Medication Sig Dispense Refill   • amLODIPine (NORVASC) 5 MG Tab TK 2 TS PO QD     • lisinopril (PRINIVIL) 20 MG Tab TK 2 TS PO D     • COMBIVENT RESPIMAT  MCG/ACT Aero Soln INHALE 1 PUFF BY MOUTH FOUR TIMES DAILY 4 g 3   • lisinopril (PRINIVIL, ZESTRIL) 30 MG tablet TAKE 1 TABLET BY MOUTH EVERY DAY 90 Tab 1   • metFORMIN (GLUCOPHAGE) 500 MG Tab TAKE 1 TABLET BY MOUTH EVERY DAY 90 Tab 3   • atorvastatin (LIPITOR) 80 MG tablet TAKE 1 TABLET BY MOUTH EVERY DAY 90 Tab 3   • fluticasone-salmeterol (ADVAIR DISKUS) 250-50 MCG/DOSE AEROSOL POWDER, BREATH ACTIVATED Inhale 1 Puff by mouth every 12 hours. 1 Inhaler 2   • Blood Glucose Monitoring Suppl SUPPLIES Misc 1 Device by Does not apply route 2 times a day as needed. Test strips order: Test strips for free style lite meter 180 Device 3     No current facility-administered medications on file prior to visit.      Social History     Socioeconomic History   • Marital status:      Spouse name: Not on file   • Number of children: Not on file   • Years of education: Not on file   • Highest education level: Not on file   Occupational History   • Not on file   Social Needs   • Financial resource strain: Not on file   • Food insecurity     Worry: Not on file     Inability: Not on file   • Transportation needs     Medical: Not on file      "Non-medical: Not on file   Tobacco Use   • Smoking status: Former Smoker   • Smokeless tobacco: Never Used   Substance and Sexual Activity   • Alcohol use: No     Alcohol/week: 0.0 oz   • Drug use: No   • Sexual activity: Not on file   Lifestyle   • Physical activity     Days per week: Not on file     Minutes per session: Not on file   • Stress: Not on file   Relationships   • Social connections     Talks on phone: Not on file     Gets together: Not on file     Attends Rastafarian service: Not on file     Active member of club or organization: Not on file     Attends meetings of clubs or organizations: Not on file     Relationship status: Not on file   • Intimate partner violence     Fear of current or ex partner: Not on file     Emotionally abused: Not on file     Physically abused: Not on file     Forced sexual activity: Not on file   Other Topics Concern   • Not on file   Social History Narrative   • Not on file     Breast Cancer-related family history is not on file.      Review of Systems   Constitutional: Negative for chills and fever.   Eyes: Negative for blurred vision, double vision, photophobia, pain, discharge and redness.   Gastrointestinal: Negative for nausea.   Neurological: Negative for dizziness and headaches.          Objective:     /64   Pulse 67   Temp 36.2 °C (97.1 °F) (Temporal)   Resp 14   Ht 1.854 m (6' 1\")   Wt 89.8 kg (198 lb)   SpO2 97%   BMI 26.12 kg/m²      Physical Exam  Vitals signs and nursing note reviewed.   Constitutional:       Appearance: Normal appearance. He is not ill-appearing.   HENT:      Head: Normocephalic.   Eyes:      General:         Right eye: No discharge.         Left eye: No discharge.      Extraocular Movements: Extraocular movements intact.      Conjunctiva/sclera: Conjunctivae normal.      Pupils: Pupils are equal, round, and reactive to light.   Cardiovascular:      Rate and Rhythm: Normal rate and regular rhythm.      Heart sounds: No murmur. "   Pulmonary:      Effort: Pulmonary effort is normal.      Breath sounds: Normal breath sounds.   Musculoskeletal: Normal range of motion.   Skin:     General: Skin is warm and dry.   Neurological:      General: No focal deficit present.      Mental Status: He is alert.                 Assessment/Plan:         1. Blurred vision, right eye     2. DM (diabetes mellitus) type II controlled peripheral vascular disorder  CANCELED: POCT Glucose       Patient to [facility] for further evaluation. Visual screen with 20/30 in right eye and 20 /40 in left.

## 2021-01-14 DIAGNOSIS — Z23 NEED FOR VACCINATION: ICD-10-CM

## 2021-03-04 ENCOUNTER — OFFICE VISIT (OUTPATIENT)
Dept: URGENT CARE | Facility: PHYSICIAN GROUP | Age: 76
End: 2021-03-04
Payer: MEDICARE

## 2021-03-04 ENCOUNTER — HOSPITAL ENCOUNTER (OUTPATIENT)
Facility: MEDICAL CENTER | Age: 76
End: 2021-03-04
Attending: FAMILY MEDICINE
Payer: MEDICARE

## 2021-03-04 VITALS
TEMPERATURE: 97.9 F | OXYGEN SATURATION: 97 % | BODY MASS INDEX: 25.05 KG/M2 | HEART RATE: 79 BPM | DIASTOLIC BLOOD PRESSURE: 70 MMHG | WEIGHT: 189 LBS | HEIGHT: 73 IN | RESPIRATION RATE: 18 BRPM | SYSTOLIC BLOOD PRESSURE: 140 MMHG

## 2021-03-04 DIAGNOSIS — J98.8 RTI (RESPIRATORY TRACT INFECTION): ICD-10-CM

## 2021-03-04 DIAGNOSIS — I10 ESSENTIAL HYPERTENSION: ICD-10-CM

## 2021-03-04 DIAGNOSIS — E11.51 DM (DIABETES MELLITUS) TYPE II, CONTROLLED, WITH PERIPHERAL VASCULAR DISORDER (HCC): ICD-10-CM

## 2021-03-04 LAB
FLUAV+FLUBV AG SPEC QL IA: NEGATIVE
INT CON NEG: NORMAL
INT CON POS: NORMAL

## 2021-03-04 PROCEDURE — 99214 OFFICE O/P EST MOD 30 MIN: CPT | Performed by: FAMILY MEDICINE

## 2021-03-04 PROCEDURE — U0005 INFEC AGEN DETEC AMPLI PROBE: HCPCS

## 2021-03-04 PROCEDURE — 87804 INFLUENZA ASSAY W/OPTIC: CPT | Performed by: FAMILY MEDICINE

## 2021-03-04 PROCEDURE — U0003 INFECTIOUS AGENT DETECTION BY NUCLEIC ACID (DNA OR RNA); SEVERE ACUTE RESPIRATORY SYNDROME CORONAVIRUS 2 (SARS-COV-2) (CORONAVIRUS DISEASE [COVID-19]), AMPLIFIED PROBE TECHNIQUE, MAKING USE OF HIGH THROUGHPUT TECHNOLOGIES AS DESCRIBED BY CMS-2020-01-R: HCPCS

## 2021-03-04 RX ORDER — BENZONATATE 100 MG/1
200 CAPSULE ORAL 3 TIMES DAILY PRN
Qty: 30 CAPSULE | Refills: 1 | Status: SHIPPED | OUTPATIENT
Start: 2021-03-04 | End: 2021-10-27

## 2021-03-04 ASSESSMENT — ENCOUNTER SYMPTOMS
COUGH: 1
SORE THROAT: 1

## 2021-03-04 NOTE — PROGRESS NOTES
Subjective:      Mello Isabel is a 75 y.o. male who presents with Cough (onset last night, sore throat last night, HX of asthma)      - This is a pleasant and nontoxic appearing 75 y.o. male with c/o 2-3 days w/ a little cough (non productive), stuffy nose and sore throat. No NVFC. He is worried about covid or flu. No cp/sob    - Hx DM2/HTN, controlled/stable on current meds. Sugars running low 100's per patient       ALLERGIES:  Patient has no known allergies.     PMH:  Past Medical History:   Diagnosis Date   • Allergy to pollen    • Asthma, mild persistent    • DM (diabetes mellitus) type II controlled peripheral vascular disorder    • Essential hypertension    • Hyperlipidemia LDL goal <70    • TIA (transient ischemic attack) 2012    x2        PSH:  Past Surgical History:   Procedure Laterality Date   • CATARACT PHACO WITH IOL      gilbert eyes   • KNEE ARTHROSCOPY      x2 left   • RETINAL DETACHMENT REPAIR      gilbert eyes       MEDS:    Current Outpatient Medications:   •  benzonatate (TESSALON) 100 MG Cap, Take 2 Capsules by mouth 3 times a day as needed for Cough., Disp: 30 capsule, Rfl: 1  •  amLODIPine (NORVASC) 5 MG Tab, TK 2 TS PO QD, Disp: , Rfl:   •  lisinopril (PRINIVIL) 20 MG Tab, TK 2 TS PO D, Disp: , Rfl:   •  COMBIVENT RESPIMAT  MCG/ACT Aero Soln, INHALE 1 PUFF BY MOUTH FOUR TIMES DAILY, Disp: 4 g, Rfl: 3  •  lisinopril (PRINIVIL, ZESTRIL) 30 MG tablet, TAKE 1 TABLET BY MOUTH EVERY DAY, Disp: 90 Tab, Rfl: 1  •  metFORMIN (GLUCOPHAGE) 500 MG Tab, TAKE 1 TABLET BY MOUTH EVERY DAY, Disp: 90 Tab, Rfl: 3  •  atorvastatin (LIPITOR) 80 MG tablet, TAKE 1 TABLET BY MOUTH EVERY DAY, Disp: 90 Tab, Rfl: 3  •  fluticasone-salmeterol (ADVAIR DISKUS) 250-50 MCG/DOSE AEROSOL POWDER, BREATH ACTIVATED, Inhale 1 Puff by mouth every 12 hours., Disp: 1 Inhaler, Rfl: 2  •  Blood Glucose Monitoring Suppl SUPPLIES Misc, 1 Device by Does not apply route 2 times a day as needed. Test strips order: Test strips for free  "style lite meter, Disp: 180 Device, Rfl: 3    ** I have documented what I find to be significant in regards to past medical, social, family and surgical history  in my HPI or under PMH/PSH/FH review section, otherwise it is noncontributory **           HPI    Review of Systems   HENT: Positive for congestion and sore throat.    Respiratory: Positive for cough.    All other systems reviewed and are negative.         Objective:     /70   Pulse 79   Temp 36.6 °C (97.9 °F) (Temporal)   Resp 18   Ht 1.854 m (6' 1\")   Wt 85.7 kg (189 lb)   SpO2 97%   BMI 24.94 kg/m²      Physical Exam  Vitals and nursing note reviewed.   Constitutional:       General: He is not in acute distress.     Appearance: He is well-developed. He is not diaphoretic.   HENT:      Head: Normocephalic and atraumatic.      Mouth/Throat:      Mouth: Mucous membranes are moist.      Pharynx: Oropharynx is clear.   Eyes:      General: No scleral icterus.     Conjunctiva/sclera: Conjunctivae normal.   Cardiovascular:      Heart sounds: Normal heart sounds. No murmur.   Pulmonary:      Effort: Pulmonary effort is normal. No respiratory distress.      Breath sounds: Normal breath sounds.   Skin:     Coloration: Skin is not pale.      Findings: No rash.   Neurological:      Mental Status: He is alert.      Motor: No abnormal muscle tone.   Psychiatric:         Mood and Affect: Mood normal.         Behavior: Behavior normal.         Judgment: Judgment normal.                 Assessment/Plan:            1. RTI (respiratory tract infection)  POCT Influenza A/B    COVID/SARS CoV-2 PCR    benzonatate (TESSALON) 100 MG Cap   2. DM (diabetes mellitus) type II controlled peripheral vascular disorder     3. Essential hypertension           - Dx, plan & d/c instructions discussed w/ patient and/or family members  - Rest, stay hydrated OTC Motrin and/or Tylenol as needed  - E.R. precautions discussed       Follow up with their PCP in 2-3 days strongly " advised, ER if not improving or feeling/getting worse.    Any realistic side effects of medications that may have been given today (i.e. Rash, GI upset/constipation, sedation, elevation of BP or blood sugars) discussed.     Patient left in stable condition

## 2021-03-05 LAB
COVID ORDER STATUS COVID19: NORMAL
SARS-COV-2 RNA RESP QL NAA+PROBE: NOTDETECTED
SPECIMEN SOURCE: NORMAL

## 2021-03-06 ENCOUNTER — OFFICE VISIT (OUTPATIENT)
Dept: URGENT CARE | Facility: PHYSICIAN GROUP | Age: 76
End: 2021-03-06
Payer: MEDICARE

## 2021-03-06 VITALS
TEMPERATURE: 98.4 F | HEART RATE: 73 BPM | BODY MASS INDEX: 25.05 KG/M2 | RESPIRATION RATE: 17 BRPM | OXYGEN SATURATION: 98 % | WEIGHT: 189 LBS | SYSTOLIC BLOOD PRESSURE: 138 MMHG | DIASTOLIC BLOOD PRESSURE: 80 MMHG | HEIGHT: 73 IN

## 2021-03-06 DIAGNOSIS — J22 LRTI (LOWER RESPIRATORY TRACT INFECTION): ICD-10-CM

## 2021-03-06 DIAGNOSIS — J45.21 EXACERBATION OF INTERMITTENT ASTHMA, UNSPECIFIED ASTHMA SEVERITY: ICD-10-CM

## 2021-03-06 PROCEDURE — 99214 OFFICE O/P EST MOD 30 MIN: CPT | Performed by: PHYSICIAN ASSISTANT

## 2021-03-06 RX ORDER — METHYLPREDNISOLONE 4 MG/1
TABLET ORAL
Qty: 21 TABLET | Refills: 0 | Status: SHIPPED | OUTPATIENT
Start: 2021-03-06 | End: 2021-10-27

## 2021-03-06 RX ORDER — AZITHROMYCIN 250 MG/1
TABLET, FILM COATED ORAL
Qty: 6 TABLET | Refills: 0 | Status: SHIPPED | OUTPATIENT
Start: 2021-03-06 | End: 2021-10-27

## 2021-03-06 RX ORDER — PRAMIPEXOLE DIHYDROCHLORIDE 0.12 MG/1
TABLET ORAL
COMMUNITY
Start: 2021-02-05 | End: 2021-06-30

## 2021-03-06 ASSESSMENT — ENCOUNTER SYMPTOMS
DIZZINESS: 0
VOMITING: 0
COUGH: 1
SHORTNESS OF BREATH: 1
ABDOMINAL PAIN: 0
FEVER: 0
DIARRHEA: 0
MYALGIAS: 0
SPUTUM PRODUCTION: 1
SINUS PAIN: 0
HEADACHES: 0
PALPITATIONS: 0
SORE THROAT: 0
WHEEZING: 1
CHILLS: 0
NAUSEA: 0

## 2021-03-06 NOTE — PROGRESS NOTES
Subjective:   Mello Isabel is a 75 y.o. male who presents for Cough (chest congestion, seen 2 days ago, taking rx, not improving)      HPI  75 y.o. male presents to urgent care with new problem to provider of persistent cough for the last week or so.  Patient has a history of asthma reports associated mild shortness of breath, wheezing, and chest tightness with cough.  He denies fevers, body aches, sore throat, headaches, nausea, vomiting,.  Patient was seen in urgent care 2 days ago and had a negative COVID-19 test done at that time.  He was given Tessalon Perles with minimal symptomatic relief. Denies other associated aggravating or alleviating factors.     Review of Systems   Constitutional: Positive for malaise/fatigue. Negative for chills and fever.   HENT: Negative for congestion, ear pain, sinus pain and sore throat.    Respiratory: Positive for cough, sputum production, shortness of breath and wheezing.    Cardiovascular: Negative for chest pain and palpitations.        Chest tightness with cough   Gastrointestinal: Negative for abdominal pain, diarrhea, nausea and vomiting.   Musculoskeletal: Negative for myalgias.   Neurological: Negative for dizziness and headaches.   All other systems reviewed and are negative.      Patient Active Problem List   Diagnosis   • Allergy to pollen   • Asthma, mild persistent   • DM (diabetes mellitus) type II controlled peripheral vascular disorder   • TIA (transient ischemic attack)   • Essential hypertension   • Hyperlipidemia LDL goal <70     Past Surgical History:   Procedure Laterality Date   • CATARACT PHACO WITH IOL      gilbert eyes   • KNEE ARTHROSCOPY      x2 left   • RETINAL DETACHMENT REPAIR      gilbert eyes     Social History     Tobacco Use   • Smoking status: Former Smoker   • Smokeless tobacco: Never Used   Substance Use Topics   • Alcohol use: No     Alcohol/week: 0.0 oz   • Drug use: No      Family History   Problem Relation Age of Onset   • Cancer Mother          "liver   • Stroke Father    • Hypertension Brother    • Hyperlipidemia Brother    • Diabetes Brother    • Hypertension Brother    • Hyperlipidemia Brother       (Allergies, Medications, & Tobacco/Substance Use were reconciled by the Medical Assistant and reviewed by myself. The family history is prepopulated)     Objective:     /80   Pulse 73   Temp 36.9 °C (98.4 °F)   Resp 17   Ht 1.854 m (6' 1\")   Wt 85.7 kg (189 lb)   SpO2 98%   BMI 24.94 kg/m²     Physical Exam  Vitals reviewed.   Constitutional:       General: He is not in acute distress.     Appearance: Normal appearance. He is well-developed. He is not ill-appearing or diaphoretic.   HENT:      Head: Normocephalic and atraumatic.      Nose: Nose normal.      Mouth/Throat:      Mouth: Mucous membranes are moist.      Pharynx: Oropharynx is clear.   Eyes:      Conjunctiva/sclera: Conjunctivae normal.   Cardiovascular:      Rate and Rhythm: Normal rate and regular rhythm.      Heart sounds: Normal heart sounds.   Pulmonary:      Effort: Pulmonary effort is normal. No respiratory distress.      Breath sounds: No wheezing or rales.      Comments: Mild rhonchi throughout  Musculoskeletal:      Cervical back: Normal range of motion and neck supple.   Skin:     General: Skin is warm and dry.      Coloration: Skin is not pale.   Neurological:      General: No focal deficit present.      Mental Status: He is alert and oriented to person, place, and time.   Psychiatric:         Mood and Affect: Mood normal.         Behavior: Behavior normal.         Thought Content: Thought content normal.         Judgment: Judgment normal.         Assessment/Plan:     1. Exacerbation of intermittent asthma, unspecified asthma severity  methylPREDNISolone (MEDROL DOSEPAK) 4 MG Tablet Therapy Pack   2. LRTI (lower respiratory tract infection)  azithromycin (ZITHROMAX) 250 MG Tab     Patient presents with persistent cough, recently tested negative for COVID-19.  I will treat " with course of azithromycin.  Patient also given Medrol Dosepak secondary to asthma exacerbation.  Patient's lung sounds are clear other than mild dry.  His vital signs are stable, he is at 98% on room air and afebrile.  No indication for chest x-ray at this time.  Patient denies associated chest pain or dyspnea on exertion.  Given strict ED and return precautions for persistent symptoms or development of worsening shortness of breath, chest pain, or fevers.    Differential diagnosis, natural history, supportive care, and indications for immediate follow-up discussed.    Advised the patient to follow-up with the primary care physician for recheck, reevaluation, and consideration of further management.  Patient verbalized understanding of treatment plan and has no further questions regarding care.     Please note that this dictation was created using voice recognition software. I have made a reasonable attempt to correct obvious errors, but I expect that there are errors of grammar and possibly content that I did not discover before finalizing the note.    This note was electronically signed by Kay Quinones PA-C

## 2021-03-14 ENCOUNTER — OFFICE VISIT (OUTPATIENT)
Dept: URGENT CARE | Facility: PHYSICIAN GROUP | Age: 76
End: 2021-03-14
Payer: MEDICARE

## 2021-03-14 ENCOUNTER — HOSPITAL ENCOUNTER (OUTPATIENT)
Facility: MEDICAL CENTER | Age: 76
End: 2021-03-14
Attending: NURSE PRACTITIONER
Payer: MEDICARE

## 2021-03-14 ENCOUNTER — HOSPITAL ENCOUNTER (OUTPATIENT)
Dept: RADIOLOGY | Facility: MEDICAL CENTER | Age: 76
End: 2021-03-14
Attending: NURSE PRACTITIONER
Payer: MEDICARE

## 2021-03-14 VITALS
HEART RATE: 71 BPM | DIASTOLIC BLOOD PRESSURE: 58 MMHG | TEMPERATURE: 98 F | HEIGHT: 73 IN | WEIGHT: 190 LBS | BODY MASS INDEX: 25.18 KG/M2 | SYSTOLIC BLOOD PRESSURE: 130 MMHG | OXYGEN SATURATION: 98 % | RESPIRATION RATE: 20 BRPM

## 2021-03-14 DIAGNOSIS — R05.9 COUGH: ICD-10-CM

## 2021-03-14 DIAGNOSIS — J01.90 ACUTE BACTERIAL SINUSITIS: ICD-10-CM

## 2021-03-14 DIAGNOSIS — J45.30 MILD PERSISTENT ASTHMA WITHOUT COMPLICATION: ICD-10-CM

## 2021-03-14 DIAGNOSIS — B96.89 ACUTE BACTERIAL SINUSITIS: ICD-10-CM

## 2021-03-14 LAB — COVID ORDER STATUS COVID19: NORMAL

## 2021-03-14 PROCEDURE — U0003 INFECTIOUS AGENT DETECTION BY NUCLEIC ACID (DNA OR RNA); SEVERE ACUTE RESPIRATORY SYNDROME CORONAVIRUS 2 (SARS-COV-2) (CORONAVIRUS DISEASE [COVID-19]), AMPLIFIED PROBE TECHNIQUE, MAKING USE OF HIGH THROUGHPUT TECHNOLOGIES AS DESCRIBED BY CMS-2020-01-R: HCPCS

## 2021-03-14 PROCEDURE — U0005 INFEC AGEN DETEC AMPLI PROBE: HCPCS

## 2021-03-14 PROCEDURE — 71046 X-RAY EXAM CHEST 2 VIEWS: CPT

## 2021-03-14 PROCEDURE — 99214 OFFICE O/P EST MOD 30 MIN: CPT | Performed by: NURSE PRACTITIONER

## 2021-03-14 RX ORDER — PREDNISONE 10 MG/1
TABLET ORAL
Qty: 10 EACH | Refills: 0 | Status: SHIPPED | OUTPATIENT
Start: 2021-03-14 | End: 2021-10-27

## 2021-03-14 RX ORDER — AMOXICILLIN AND CLAVULANATE POTASSIUM 875; 125 MG/1; MG/1
1 TABLET, FILM COATED ORAL 2 TIMES DAILY
Qty: 14 TABLET | Refills: 0 | Status: SHIPPED | OUTPATIENT
Start: 2021-03-14 | End: 2021-03-21

## 2021-03-14 ASSESSMENT — ENCOUNTER SYMPTOMS
WHEEZING: 0
SORE THROAT: 0
NAUSEA: 0
CHILLS: 0
SPUTUM PRODUCTION: 1
COUGH: 1
FEVER: 0
DIZZINESS: 0
HEMOPTYSIS: 0
MYALGIAS: 0
SHORTNESS OF BREATH: 1
HEADACHES: 0

## 2021-03-14 NOTE — PROGRESS NOTES
Subjective:      Mello Isabel is a 75 y.o. male who presents with Cough (pt states he has asthma, hurts lungs to cough x 3 weeks)            HPI Recurrent. 75 year old male with cough x 3 weeks. History of asthma. States cough is productive with some shortness of breath. Coughing up yellow mucous, no blood. Denies fever, chills, wheezing, nausea or diarrhea. He does report nose bleeds with congestion. This is his third visit for this. Had some improvement with z-pack and medrol. He has been using combivent inhaler for his symptoms.  Patient has no known allergies.  Current Outpatient Medications on File Prior to Visit   Medication Sig Dispense Refill   • pramipexole (MIRAPEX) 0.125 MG Tab TAKE 1 TO 2 TABLETS BY MOUTH EVERY NIGHT AT BEDTIME     • amLODIPine (NORVASC) 5 MG Tab TK 2 TS PO QD     • lisinopril (PRINIVIL) 20 MG Tab TK 2 TS PO D     • COMBIVENT RESPIMAT  MCG/ACT Aero Soln INHALE 1 PUFF BY MOUTH FOUR TIMES DAILY 4 g 3   • metFORMIN (GLUCOPHAGE) 500 MG Tab TAKE 1 TABLET BY MOUTH EVERY DAY 90 Tab 3   • atorvastatin (LIPITOR) 80 MG tablet TAKE 1 TABLET BY MOUTH EVERY DAY 90 Tab 3   • Blood Glucose Monitoring Suppl SUPPLIES Misc 1 Device by Does not apply route 2 times a day as needed. Test strips order: Test strips for free style lite meter 180 Device 3   • methylPREDNISolone (MEDROL DOSEPAK) 4 MG Tablet Therapy Pack Follow schedule on package instructions. (Patient not taking: Reported on 3/14/2021) 21 tablet 0   • azithromycin (ZITHROMAX) 250 MG Tab Take 500mg on day 1, then take 250mg on day 2 through 5 (Patient not taking: Reported on 3/14/2021) 6 tablet 0   • benzonatate (TESSALON) 100 MG Cap Take 2 Capsules by mouth 3 times a day as needed for Cough. (Patient not taking: Reported on 3/14/2021) 30 capsule 1   • lisinopril (PRINIVIL, ZESTRIL) 30 MG tablet TAKE 1 TABLET BY MOUTH EVERY DAY (Patient not taking: Reported on 3/14/2021) 90 Tab 1   • fluticasone-salmeterol (ADVAIR DISKUS) 250-50 MCG/DOSE  AEROSOL POWDER, BREATH ACTIVATED Inhale 1 Puff by mouth every 12 hours. (Patient not taking: Reported on 3/6/2021) 1 Inhaler 2     No current facility-administered medications on file prior to visit.     Social History     Socioeconomic History   • Marital status:      Spouse name: Not on file   • Number of children: Not on file   • Years of education: Not on file   • Highest education level: Not on file   Occupational History   • Not on file   Tobacco Use   • Smoking status: Former Smoker   • Smokeless tobacco: Never Used   Substance and Sexual Activity   • Alcohol use: No     Alcohol/week: 0.0 oz   • Drug use: No   • Sexual activity: Not on file   Other Topics Concern   • Not on file   Social History Narrative   • Not on file     Social Determinants of Health     Financial Resource Strain:    • Difficulty of Paying Living Expenses:    Food Insecurity:    • Worried About Running Out of Food in the Last Year:    • Ran Out of Food in the Last Year:    Transportation Needs:    • Lack of Transportation (Medical):    • Lack of Transportation (Non-Medical):    Physical Activity:    • Days of Exercise per Week:    • Minutes of Exercise per Session:    Stress:    • Feeling of Stress :    Social Connections:    • Frequency of Communication with Friends and Family:    • Frequency of Social Gatherings with Friends and Family:    • Attends Jewish Services:    • Active Member of Clubs or Organizations:    • Attends Club or Organization Meetings:    • Marital Status:    Intimate Partner Violence:    • Fear of Current or Ex-Partner:    • Emotionally Abused:    • Physically Abused:    • Sexually Abused:      Breast Cancer-related family history is not on file.      Review of Systems   Constitutional: Negative for chills, fever and malaise/fatigue.   HENT: Positive for congestion and nosebleeds. Negative for sore throat.    Respiratory: Positive for cough, sputum production and shortness of breath. Negative for hemoptysis  "and wheezing.    Cardiovascular:        Left sided chest wall pain with cough, sharp.   Gastrointestinal: Negative for nausea.   Musculoskeletal: Negative for myalgias.   Neurological: Negative for dizziness and headaches.          Objective:     /58 (BP Location: Left arm, Patient Position: Sitting, BP Cuff Size: Adult)   Pulse 71   Temp 36.7 °C (98 °F)   Resp 20   Ht 1.854 m (6' 1\")   Wt 86.2 kg (190 lb)   SpO2 98%   BMI 25.07 kg/m²      Physical Exam  Vitals and nursing note reviewed.   Constitutional:       General: He is not in acute distress.     Appearance: Normal appearance. He is well-developed.   HENT:      Head: Normocephalic and atraumatic.      Right Ear: Tympanic membrane, ear canal and external ear normal. No middle ear effusion. Tympanic membrane is not injected or perforated.      Left Ear: Tympanic membrane, ear canal and external ear normal.  No middle ear effusion. Tympanic membrane is not injected or perforated.      Nose: Mucosal edema and congestion present.   Eyes:      General:         Right eye: No discharge.         Left eye: No discharge.      Conjunctiva/sclera: Conjunctivae normal.   Cardiovascular:      Rate and Rhythm: Normal rate and regular rhythm.      Heart sounds: Normal heart sounds. No murmur.   Pulmonary:      Effort: Pulmonary effort is normal. No respiratory distress.      Breath sounds: Normal breath sounds. No wheezing or rales.   Musculoskeletal:         General: Normal range of motion.      Cervical back: Normal range of motion and neck supple.      Comments: Normal movement of all 4 extremities.   Lymphadenopathy:      Cervical: No cervical adenopathy.      Upper Body:      Right upper body: No supraclavicular adenopathy.      Left upper body: No supraclavicular adenopathy.   Skin:     General: Skin is warm and dry.   Neurological:      Mental Status: He is alert and oriented to person, place, and time.      Gait: Gait normal.   Psychiatric:         " Behavior: Behavior normal.         Thought Content: Thought content normal.                 Assessment/Plan:        1. Acute bacterial sinusitis  amoxicillin-clavulanate (AUGMENTIN) 875-125 MG Tab   2. Cough  DX-CHEST-2 VIEWS    COVID/SARS CoV-2 PCR    predniSONE (DELTASONE) 10 MG Tab   3. Mild persistent asthma without complication       Stable asthma  Chest imaging negative. His vitals are stable with saturation of 98.  Reviewed with patient this could be a sinus etiology and will treat with augmentin and another course of prednisone.   covid test per wife's request.  Differential diagnosis, natural history, supportive care, and indications for immediate follow-up discussed at length.

## 2021-03-15 LAB
SARS-COV-2 RNA RESP QL NAA+PROBE: NOTDETECTED
SPECIMEN SOURCE: NORMAL

## 2021-06-23 PROBLEM — M54.41 CHRONIC RIGHT-SIDED LOW BACK PAIN WITH RIGHT-SIDED SCIATICA: Status: ACTIVE | Noted: 2021-06-23

## 2021-06-23 PROBLEM — M51.369 OTHER INTERVERTEBRAL DISC DEGENERATION, LUMBAR REGION: Status: ACTIVE | Noted: 2021-06-23

## 2021-06-23 PROBLEM — G89.29 CHRONIC RIGHT-SIDED LOW BACK PAIN WITH RIGHT-SIDED SCIATICA: Status: ACTIVE | Noted: 2021-06-23

## 2021-06-23 PROBLEM — M47.816 LUMBAR SPONDYLOSIS: Status: ACTIVE | Noted: 2021-06-23

## 2021-06-23 PROBLEM — M51.36 OTHER INTERVERTEBRAL DISC DEGENERATION, LUMBAR REGION: Status: ACTIVE | Noted: 2021-06-23

## 2021-06-23 PROBLEM — M79.18 MYOFASCIAL PAIN SYNDROME OF LUMBAR SPINE: Status: ACTIVE | Noted: 2021-06-23

## 2021-10-27 ENCOUNTER — APPOINTMENT (OUTPATIENT)
Dept: RADIOLOGY | Facility: MEDICAL CENTER | Age: 76
End: 2021-10-27
Payer: MEDICARE

## 2021-10-27 ENCOUNTER — HOSPITAL ENCOUNTER (OUTPATIENT)
Facility: MEDICAL CENTER | Age: 76
End: 2021-10-28
Attending: EMERGENCY MEDICINE | Admitting: INTERNAL MEDICINE
Payer: MEDICARE

## 2021-10-27 DIAGNOSIS — M54.41 CHRONIC RIGHT-SIDED LOW BACK PAIN WITH RIGHT-SIDED SCIATICA: ICD-10-CM

## 2021-10-27 DIAGNOSIS — D72.10 EOSINOPHILIA, UNSPECIFIED TYPE: ICD-10-CM

## 2021-10-27 DIAGNOSIS — M47.816 LUMBAR SPONDYLOSIS: ICD-10-CM

## 2021-10-27 DIAGNOSIS — R42 DIZZINESS: ICD-10-CM

## 2021-10-27 DIAGNOSIS — E11.51 DM (DIABETES MELLITUS) TYPE II, CONTROLLED, WITH PERIPHERAL VASCULAR DISORDER (HCC): ICD-10-CM

## 2021-10-27 DIAGNOSIS — J45.40 MODERATE PERSISTENT ASTHMA WITHOUT COMPLICATION: ICD-10-CM

## 2021-10-27 DIAGNOSIS — J45.30 MILD PERSISTENT ASTHMA WITHOUT COMPLICATION: ICD-10-CM

## 2021-10-27 DIAGNOSIS — E78.5 HYPERLIPIDEMIA LDL GOAL <70: ICD-10-CM

## 2021-10-27 DIAGNOSIS — I10 ESSENTIAL HYPERTENSION: ICD-10-CM

## 2021-10-27 DIAGNOSIS — M79.18 MYOFASCIAL PAIN SYNDROME OF LUMBAR SPINE: ICD-10-CM

## 2021-10-27 DIAGNOSIS — G89.29 CHRONIC RIGHT-SIDED LOW BACK PAIN WITH RIGHT-SIDED SCIATICA: ICD-10-CM

## 2021-10-27 DIAGNOSIS — R07.9 CHEST PAIN, UNSPECIFIED TYPE: ICD-10-CM

## 2021-10-27 DIAGNOSIS — G45.9 TIA (TRANSIENT ISCHEMIC ATTACK): ICD-10-CM

## 2021-10-27 DIAGNOSIS — R07.9 ACUTE CHEST PAIN: ICD-10-CM

## 2021-10-27 PROBLEM — D72.829 LEUKOCYTOSIS: Status: ACTIVE | Noted: 2021-10-27

## 2021-10-27 PROBLEM — J45.909 MODERATE ASTHMA: Status: ACTIVE | Noted: 2021-10-27

## 2021-10-27 LAB
ALBUMIN SERPL BCP-MCNC: 4.6 G/DL (ref 3.2–4.9)
ALBUMIN/GLOB SERPL: 1.8 G/DL
ALP SERPL-CCNC: 103 U/L (ref 30–99)
ALT SERPL-CCNC: 20 U/L (ref 2–50)
ANION GAP SERPL CALC-SCNC: 10 MMOL/L (ref 7–16)
AST SERPL-CCNC: 21 U/L (ref 12–45)
BASOPHILS # BLD AUTO: 0.2 % (ref 0–1.8)
BASOPHILS # BLD: 0.03 K/UL (ref 0–0.12)
BILIRUB SERPL-MCNC: 0.3 MG/DL (ref 0.1–1.5)
BUN SERPL-MCNC: 31 MG/DL (ref 8–22)
CALCIUM SERPL-MCNC: 10 MG/DL (ref 8.5–10.5)
CHLORIDE SERPL-SCNC: 105 MMOL/L (ref 96–112)
CO2 SERPL-SCNC: 24 MMOL/L (ref 20–33)
CREAT SERPL-MCNC: 1.04 MG/DL (ref 0.5–1.4)
D DIMER PPP IA.FEU-MCNC: 0.3 UG/ML (FEU) (ref 0–0.5)
EKG IMPRESSION: NORMAL
EOSINOPHIL # BLD AUTO: 0 K/UL (ref 0–0.51)
EOSINOPHIL NFR BLD: 0 % (ref 0–6.9)
ERYTHROCYTE [DISTWIDTH] IN BLOOD BY AUTOMATED COUNT: 40 FL (ref 35.9–50)
EST. AVERAGE GLUCOSE BLD GHB EST-MCNC: 128 MG/DL
GLOBULIN SER CALC-MCNC: 2.5 G/DL (ref 1.9–3.5)
GLUCOSE SERPL-MCNC: 138 MG/DL (ref 65–99)
HBA1C MFR BLD: 6.1 % (ref 4–5.6)
HCT VFR BLD AUTO: 39.2 % (ref 42–52)
HGB BLD-MCNC: 12.9 G/DL (ref 14–18)
IMM GRANULOCYTES # BLD AUTO: 0.08 K/UL (ref 0–0.11)
IMM GRANULOCYTES NFR BLD AUTO: 0.5 % (ref 0–0.9)
LYMPHOCYTES # BLD AUTO: 1.88 K/UL (ref 1–4.8)
LYMPHOCYTES NFR BLD: 12.3 % (ref 22–41)
MCH RBC QN AUTO: 29.1 PG (ref 27–33)
MCHC RBC AUTO-ENTMCNC: 32.9 G/DL (ref 33.7–35.3)
MCV RBC AUTO: 88.3 FL (ref 81.4–97.8)
MONOCYTES # BLD AUTO: 0.68 K/UL (ref 0–0.85)
MONOCYTES NFR BLD AUTO: 4.5 % (ref 0–13.4)
NEUTROPHILS # BLD AUTO: 12.6 K/UL (ref 1.82–7.42)
NEUTROPHILS NFR BLD: 82.5 % (ref 44–72)
NRBC # BLD AUTO: 0 K/UL
NRBC BLD-RTO: 0 /100 WBC
PLATELET # BLD AUTO: 181 K/UL (ref 164–446)
PMV BLD AUTO: 12.7 FL (ref 9–12.9)
POTASSIUM SERPL-SCNC: 4.9 MMOL/L (ref 3.6–5.5)
PROCALCITONIN SERPL-MCNC: <0.05 NG/ML
PROT SERPL-MCNC: 7.1 G/DL (ref 6–8.2)
RBC # BLD AUTO: 4.44 M/UL (ref 4.7–6.1)
SODIUM SERPL-SCNC: 139 MMOL/L (ref 135–145)
TROPONIN T SERPL-MCNC: 10 NG/L (ref 6–19)
TROPONIN T SERPL-MCNC: 9 NG/L (ref 6–19)
WBC # BLD AUTO: 15.3 K/UL (ref 4.8–10.8)

## 2021-10-27 PROCEDURE — A9270 NON-COVERED ITEM OR SERVICE: HCPCS | Performed by: INTERNAL MEDICINE

## 2021-10-27 PROCEDURE — 36415 COLL VENOUS BLD VENIPUNCTURE: CPT

## 2021-10-27 PROCEDURE — 93005 ELECTROCARDIOGRAM TRACING: CPT

## 2021-10-27 PROCEDURE — 700102 HCHG RX REV CODE 250 W/ 637 OVERRIDE(OP): Performed by: INTERNAL MEDICINE

## 2021-10-27 PROCEDURE — 99285 EMERGENCY DEPT VISIT HI MDM: CPT

## 2021-10-27 PROCEDURE — 85379 FIBRIN DEGRADATION QUANT: CPT

## 2021-10-27 PROCEDURE — 99220 PR INITIAL OBSERVATION CARE,LEVL III: CPT | Performed by: INTERNAL MEDICINE

## 2021-10-27 PROCEDURE — 700111 HCHG RX REV CODE 636 W/ 250 OVERRIDE (IP): Performed by: INTERNAL MEDICINE

## 2021-10-27 PROCEDURE — 85025 COMPLETE CBC W/AUTO DIFF WBC: CPT

## 2021-10-27 PROCEDURE — 71045 X-RAY EXAM CHEST 1 VIEW: CPT

## 2021-10-27 PROCEDURE — 93005 ELECTROCARDIOGRAM TRACING: CPT | Performed by: EMERGENCY MEDICINE

## 2021-10-27 PROCEDURE — G0378 HOSPITAL OBSERVATION PER HR: HCPCS

## 2021-10-27 PROCEDURE — 84145 PROCALCITONIN (PCT): CPT

## 2021-10-27 PROCEDURE — 84484 ASSAY OF TROPONIN QUANT: CPT | Mod: 91

## 2021-10-27 PROCEDURE — 96374 THER/PROPH/DIAG INJ IV PUSH: CPT

## 2021-10-27 PROCEDURE — 93005 ELECTROCARDIOGRAM TRACING: CPT | Performed by: INTERNAL MEDICINE

## 2021-10-27 PROCEDURE — 83036 HEMOGLOBIN GLYCOSYLATED A1C: CPT

## 2021-10-27 PROCEDURE — 80053 COMPREHEN METABOLIC PANEL: CPT

## 2021-10-27 RX ORDER — MULTIVIT-MIN/IRON/FOLIC ACID/K 18-600-40
2000 CAPSULE ORAL DAILY
Status: SHIPPED | COMMUNITY
End: 2022-06-14

## 2021-10-27 RX ORDER — CETIRIZINE HYDROCHLORIDE 10 MG/1
10 TABLET ORAL PRN
Status: SHIPPED | COMMUNITY
End: 2022-06-15

## 2021-10-27 RX ORDER — BENZONATATE 100 MG/1
200 CAPSULE ORAL 3 TIMES DAILY PRN
Status: DISCONTINUED | OUTPATIENT
Start: 2021-10-27 | End: 2021-10-27

## 2021-10-27 RX ORDER — POLYETHYLENE GLYCOL 3350 17 G/17G
1 POWDER, FOR SOLUTION ORAL
Status: DISCONTINUED | OUTPATIENT
Start: 2021-10-27 | End: 2021-10-28 | Stop reason: HOSPADM

## 2021-10-27 RX ORDER — ENALAPRILAT 1.25 MG/ML
1.25 INJECTION INTRAVENOUS EVERY 6 HOURS PRN
Status: DISCONTINUED | OUTPATIENT
Start: 2021-10-27 | End: 2021-10-28 | Stop reason: HOSPADM

## 2021-10-27 RX ORDER — ASPIRIN 325 MG
325 TABLET ORAL DAILY
Status: DISCONTINUED | OUTPATIENT
Start: 2021-10-28 | End: 2021-10-28 | Stop reason: HOSPADM

## 2021-10-27 RX ORDER — LABETALOL HYDROCHLORIDE 5 MG/ML
10 INJECTION, SOLUTION INTRAVENOUS EVERY 4 HOURS PRN
Status: DISCONTINUED | OUTPATIENT
Start: 2021-10-27 | End: 2021-10-28 | Stop reason: HOSPADM

## 2021-10-27 RX ORDER — AMLODIPINE BESYLATE 5 MG/1
5 TABLET ORAL
Status: DISCONTINUED | OUTPATIENT
Start: 2021-10-28 | End: 2021-10-28 | Stop reason: HOSPADM

## 2021-10-27 RX ORDER — AMOXICILLIN 250 MG
2 CAPSULE ORAL 2 TIMES DAILY
Status: DISCONTINUED | OUTPATIENT
Start: 2021-10-27 | End: 2021-10-28 | Stop reason: HOSPADM

## 2021-10-27 RX ORDER — ONDANSETRON 2 MG/ML
4 INJECTION INTRAMUSCULAR; INTRAVENOUS EVERY 4 HOURS PRN
Status: DISCONTINUED | OUTPATIENT
Start: 2021-10-27 | End: 2021-10-28 | Stop reason: HOSPADM

## 2021-10-27 RX ORDER — ASPIRIN 81 MG/1
324 TABLET, CHEWABLE ORAL ONCE
Status: DISPENSED | OUTPATIENT
Start: 2021-10-27 | End: 2021-10-28

## 2021-10-27 RX ORDER — FLUOXETINE 10 MG/1
10 CAPSULE ORAL DAILY
Status: SHIPPED | COMMUNITY
End: 2023-01-16

## 2021-10-27 RX ORDER — ONDANSETRON 4 MG/1
4 TABLET, ORALLY DISINTEGRATING ORAL EVERY 4 HOURS PRN
Status: DISCONTINUED | OUTPATIENT
Start: 2021-10-27 | End: 2021-10-28 | Stop reason: HOSPADM

## 2021-10-27 RX ORDER — ACETAMINOPHEN 325 MG/1
650 TABLET ORAL EVERY 6 HOURS PRN
Status: DISCONTINUED | OUTPATIENT
Start: 2021-10-27 | End: 2021-10-28 | Stop reason: HOSPADM

## 2021-10-27 RX ORDER — LISINOPRIL 20 MG/1
40 TABLET ORAL
Status: DISCONTINUED | OUTPATIENT
Start: 2021-10-28 | End: 2021-10-28 | Stop reason: HOSPADM

## 2021-10-27 RX ORDER — ATORVASTATIN CALCIUM 80 MG/1
80 TABLET, FILM COATED ORAL
Status: DISCONTINUED | OUTPATIENT
Start: 2021-10-27 | End: 2021-10-28 | Stop reason: HOSPADM

## 2021-10-27 RX ORDER — BISACODYL 10 MG
10 SUPPOSITORY, RECTAL RECTAL
Status: DISCONTINUED | OUTPATIENT
Start: 2021-10-27 | End: 2021-10-28 | Stop reason: HOSPADM

## 2021-10-27 RX ORDER — ASPIRIN 81 MG/1
324 TABLET, CHEWABLE ORAL DAILY
Status: DISCONTINUED | OUTPATIENT
Start: 2021-10-28 | End: 2021-10-28 | Stop reason: HOSPADM

## 2021-10-27 RX ORDER — ASPIRIN 300 MG/1
300 SUPPOSITORY RECTAL DAILY
Status: DISCONTINUED | OUTPATIENT
Start: 2021-10-28 | End: 2021-10-28 | Stop reason: HOSPADM

## 2021-10-27 RX ADMIN — ATORVASTATIN CALCIUM 80 MG: 80 TABLET, FILM COATED ORAL at 20:41

## 2021-10-27 RX ADMIN — ENALAPRILAT 1.25 MG: 1.25 INJECTION INTRAVENOUS at 18:25

## 2021-10-27 ASSESSMENT — ENCOUNTER SYMPTOMS
PSYCHIATRIC NEGATIVE: 1
GASTROINTESTINAL NEGATIVE: 1
EYES NEGATIVE: 1
SHORTNESS OF BREATH: 1
NEUROLOGICAL NEGATIVE: 1
COUGH: 0
ORTHOPNEA: 0
MUSCULOSKELETAL NEGATIVE: 1
PALPITATIONS: 0
WHEEZING: 0
HEMOPTYSIS: 0
CONSTITUTIONAL NEGATIVE: 1

## 2021-10-27 ASSESSMENT — PAIN DESCRIPTION - PAIN TYPE
TYPE: ACUTE PAIN
TYPE: ACUTE PAIN

## 2021-10-27 ASSESSMENT — HEART SCORE
HEART SCORE: 6
RISK FACTORS: >2 RISK FACTORS OR HX OF ATHEROSCLEROTIC DISEASE
ECG: NON-SPECIFIC REPOLARIZATION DISTURBANCE
TROPONIN: LESS THAN OR EQUAL TO NORMAL LIMIT
AGE: 65+
HISTORY: MODERATELY SUSPICIOUS

## 2021-10-27 ASSESSMENT — FIBROSIS 4 INDEX: FIB4 SCORE: 1.97

## 2021-10-27 NOTE — ED TRIAGE NOTES
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 hx of asthma. no cardiac hx.    • Shortness of Breath     BIB EMS for above. No longer has either CP or SOB.   Protocol ordered.

## 2021-10-27 NOTE — ED NOTES
Med Rec completed per patient and med list at bedside (Copied and returned)  Allergies reviewed  No ORAL antibiotics in last 30 days

## 2021-10-27 NOTE — H&P
Hospital Medicine History & Physical Note    Date of Service  10/27/2021    Primary Care Physician  Denver J Miller, M.D.    Consultants  none    Code Status  Full Code    Chief Complaint  Chief Complaint   Patient presents with   • Chest Pain     sudden onset while doing yeard work 1 hour ago. hx of asthma. no cardiac hx.    • Shortness of Breath       History of Presenting Illness    76-year-old male with history of asthma diabetes hyperlipidemia and hypertension presented 10/27 with chest pain, the pain started today when he was working at his backyard, sharp pain on the left of his chest, did not move, 10 on 10, associated with shortness of breath and nausea however no vomiting, denied fever or chills, denied any urinary or bowel symptoms, patient states he has had some dizziness especially when he moves fast, no palpitation or loss of consciousness, on admission his blood pressure was not controlled 150/90, EKG did not show any ischemic changes however showed right bundle branch block, troponin was 10 and D-dimer was normal, patient will be admitted to the hospital for chest rule out and stress test.    Patient states he was admitted to other facility a year ago for the same chest pain and he was told everything was normal.    Patient quit smoking more than 30 years, no family history of heart disease and he is taking aspirin every day, patient states his blood pressure is controlled.    I discussed the plan of care with patient, family, bedside RN and ED doc .    Review of Systems  Review of Systems   Constitutional: Negative.    HENT: Negative.    Eyes: Negative.    Respiratory: Positive for shortness of breath. Negative for cough, hemoptysis and wheezing.    Cardiovascular: Positive for chest pain. Negative for palpitations and orthopnea.   Gastrointestinal: Negative.    Genitourinary: Negative.    Musculoskeletal: Negative.    Skin: Negative.    Neurological: Negative.    Endo/Heme/Allergies: Negative.     Psychiatric/Behavioral: Negative.        Past Medical History   has a past medical history of Allergy to pollen, Asthma, mild persistent, DM (diabetes mellitus) type II controlled peripheral vascular disorder, Essential hypertension, Hyperlipidemia LDL goal <70, and TIA (transient ischemic attack) ().    Surgical History   has a past surgical history that includes cataract phaco with iol; retinal detachment repair; and knee arthroscopy.     Family History  family history includes Cancer in his mother; Diabetes in his brother; Hyperlipidemia in his brother and brother; Hypertension in his brother and brother; Stroke in his father.   Family history reviewed with patient. There is no family history that is pertinent to the chief complaint.     Social History   reports that he has quit smoking. He has never used smokeless tobacco. He reports that he does not drink alcohol and does not use drugs.    Allergies  No Known Allergies    Medications  Prior to Admission Medications   Prescriptions Last Dose Informant Patient Reported? Taking?   Blood Glucose Monitoring Suppl SUPPLIES Misc   No No   Si Device by Does not apply route 2 times a day as needed. Test strips order: Test strips for free style lite meter   COMBIVENT RESPIMAT  MCG/ACT Aero Soln   No No   Sig: INHALE 1 PUFF BY MOUTH FOUR TIMES DAILY   amLODIPine (NORVASC) 5 MG Tab   Yes No   Sig: TK 2 TS PO QD   atorvastatin (LIPITOR) 80 MG tablet   No No   Sig: TAKE 1 TABLET BY MOUTH EVERY DAY   azithromycin (ZITHROMAX) 250 MG Tab   No No   Sig: Take 500mg on day 1, then take 250mg on day 2 through 5   Patient not taking: Reported on 3/14/2021   benzonatate (TESSALON) 100 MG Cap   No No   Sig: Take 2 Capsules by mouth 3 times a day as needed for Cough.   Patient not taking: Reported on 3/14/2021   fluticasone-salmeterol (ADVAIR DISKUS) 250-50 MCG/DOSE AEROSOL POWDER, BREATH ACTIVATED   No No   Sig: Inhale 1 Puff by mouth every 12 hours.   Patient not  taking: Reported on 3/6/2021   lisinopril (PRINIVIL) 20 MG Tab   Yes No   Sig: TK 2 TS PO D   lisinopril (PRINIVIL, ZESTRIL) 30 MG tablet   No No   Sig: TAKE 1 TABLET BY MOUTH EVERY DAY   Patient not taking: Reported on 3/14/2021   metFORMIN (GLUCOPHAGE) 500 MG Tab   No No   Sig: TAKE 1 TABLET BY MOUTH EVERY DAY   methylPREDNISolone (MEDROL DOSEPAK) 4 MG Tablet Therapy Pack   No No   Sig: Follow schedule on package instructions.   Patient not taking: Reported on 3/14/2021   pramipexole (MIRAPEX) 0.125 MG Tab   No No   Sig: TAKE 1 TO 2 TABLETS BY MOUTH EVERY NIGHT AT BEDTIME   predniSONE (DELTASONE) 10 MG Tab   No No   Sig: Take 2 tabs daily for 5 days.      Facility-Administered Medications: None       Physical Exam  Temp:  [36.9 °C (98.4 °F)] 36.9 °C (98.4 °F)  Pulse:  [74-80] 80  Resp:  [16-22] 22  BP: (150-186)/(69-86) 186/86  SpO2:  [97 %-98 %] 98 %  Blood Pressure : (!) 186/86   Temperature: 36.9 °C (98.4 °F)   Pulse: 80   Respiration: (!) 22   Pulse Oximetry: 98 %       Physical Exam  Constitutional:       Appearance: He is not ill-appearing.   HENT:      Head: Normocephalic and atraumatic.   Eyes:      General: No scleral icterus.  Cardiovascular:      Rate and Rhythm: Normal rate.      Heart sounds: No murmur heard.     Pulmonary:      Effort: No respiratory distress.      Breath sounds: No wheezing or rales.   Abdominal:      General: There is no distension.      Tenderness: There is no abdominal tenderness. There is no guarding.   Musculoskeletal:         General: No swelling or deformity.      Right lower leg: No edema.   Skin:     Coloration: Skin is not jaundiced or pale.      Findings: No bruising.   Neurological:      General: No focal deficit present.      Mental Status: He is alert and oriented to person, place, and time. Mental status is at baseline.      Cranial Nerves: No cranial nerve deficit.      Motor: No weakness.   Psychiatric:         Mood and Affect: Mood normal.          Laboratory:  Recent Labs     10/27/21  1143   WBC 15.3*   RBC 4.44*   HEMOGLOBIN 12.9*   HEMATOCRIT 39.2*   MCV 88.3   MCH 29.1   MCHC 32.9*   RDW 40.0   PLATELETCT 181   MPV 12.7     Recent Labs     10/27/21  1143   SODIUM 139   POTASSIUM 4.9   CHLORIDE 105   CO2 24   GLUCOSE 138*   BUN 31*   CREATININE 1.04   CALCIUM 10.0     Recent Labs     10/27/21  1143   ALTSGPT 20   ASTSGOT 21   ALKPHOSPHAT 103*   TBILIRUBIN 0.3   GLUCOSE 138*         No results for input(s): NTPROBNP in the last 72 hours.      Recent Labs     10/27/21  1143   TROPONINT 10       Imaging:  DX-CHEST-PORTABLE (1 VIEW)   Final Result      No acute cardiac or pulmonary abnormalities are identified.      EC-ECHOCARDIOGRAM COMPLETE W/O CONT    (Results Pending)   NM-CARDIAC STRESS TEST    (Results Pending)       X-Ray:  I have personally reviewed the images and compared with prior images.  EKG:  I have personally reviewed the images and compared with prior images.    Assessment/Plan:  I anticipate this patient is appropriate for observation status at this time.    * Pain in the chest  Assessment & Plan  Sharp pain, 10 on 10  No ischemic changes on EKG and troponin normal  D-dimer is normal, less likely to be PE, no need for CTA  Heart score is 5, moderate  Telemetry and trending troponin  Echo for dizziness  Stress test  Aspirin and atorvastatin  Consider beta-blockers if needed  Nitroglycerin for chest pain as needed    Leukocytosis  Assessment & Plan  No fever and no signs of infection  No need for antibiotics at this time  Likely related to stress and history of taking steroid  Check procalcitonin and repeat labs tomorrow    Moderate asthma  Assessment & Plan  No exacerbation and no wheezing  Chest x-ray, reviewed personally, no acute infiltration or findings  Continue home inhalers    Dizziness  Assessment & Plan  Could be related to orthostatic or vasovagal  Telemetry  Echo      Essential hypertension- (present on admission)  Assessment  & Plan  On admission was not controlled 150/90  Patient states his blood pressure around 120/80 at home  Continue home medication amlodipine and lisinopril  Adjust the medication and add metoprolol if needed    Hyperlipidemia LDL goal <70- (present on admission)  Assessment & Plan  Continue atorvastatin 80 mg daily  Check lipid panel tomorrow morning    DM (diabetes mellitus) type II controlled peripheral vascular disorder- (present on admission)  Assessment & Plan  A1c 6.4 and was 4 years ago  Recheck A1c   hold Metformin  Sliding scale        VTE prophylaxis: SCDs/TEDs and enoxaparin ppx

## 2021-10-27 NOTE — ED PROVIDER NOTES
ED Provider Note    Scribed for Mariola Dudley M.D. by Uriel Streeter. 10/27/2021, 12:17 PM.    Primary care provider: Denver J Miller, M.D.  Means of arrival: EMS  History obtained from: Patient  History limited by: None    CHIEF COMPLAINT  Chief Complaint   Patient presents with    Chest Pain     sudden onset while doing yeard work 1 hour ago. hx of asthma. no cardiac hx.     Shortness of Breath       HPI  Mello Isabel is a 76 y.o. male with a history of asthma, diabetes, hyperlipidemia and hypertension who presents to the Emergency Department via EMS for evaluation of sharp chest pain onset 1 hour ago while doing yard work. The pain has since resolved. The pain did not radiate anywhere. He has associated shortness of breath. He denies any leg swelling, cough, fever, or abdominal pains. No alleviating or exacerbating factors noted. He states that he fell a few days ago and hurt his shoulder and arm, but he notes that he did not hurt his chest. He currently takes 1 baby Aspirin a day. He took 4 baby aspirin before coming as advised by EMS. He has no history of heart attacks, heart failure, or stents. He does note that he has had a TIA about 1 year ago.    REVIEW OF SYSTEMS  Pertinent positives include chest pain, and shortness of breath. Pertinent negatives include no leg swelling, cough, fever, or abdominal pain. All other systems reviewed and negative.     PAST MEDICAL HISTORY   has a past medical history of Allergy to pollen, Asthma, mild persistent, DM (diabetes mellitus) type II controlled peripheral vascular disorder, Essential hypertension, Hyperlipidemia LDL goal <70, and TIA (transient ischemic attack) (2012).    SURGICAL HISTORY   has a past surgical history that includes cataract phaco with iol; retinal detachment repair; and knee arthroscopy.    SOCIAL HISTORY  Social History     Tobacco Use    Smoking status: Former Smoker    Smokeless tobacco: Never Used   Substance Use Topics    Alcohol use: No     " Alcohol/week: 0.0 oz    Drug use: No      Social History     Substance and Sexual Activity   Drug Use No       FAMILY HISTORY  Family History   Problem Relation Age of Onset    Cancer Mother         liver    Stroke Father     Hypertension Brother     Hyperlipidemia Brother     Diabetes Brother     Hypertension Brother     Hyperlipidemia Brother        CURRENT MEDICATIONS  Home Medications       Reviewed by Kristen Saucedo PhT (Pharmacy Tech) on 10/27/21 at 1623  Med List Status: Complete     Medication Last Dose Status   Acetaminophen (TYLENOL PO) 10/27/2021 Active   amLODIPine (NORVASC) 10 MG Tab 10/27/2021 Active   aspirin EC (ECOTRIN) 81 MG Tablet Delayed Response 10/26/2021 Active   atorvastatin (LIPITOR) 80 MG tablet 10/26/2021 Active   Blood Glucose Monitoring Suppl SUPPLIES Misc Continuous Active   Calcium Carbonate (CALCIUM 500 PO) 10/27/2021 Active   cetirizine (ZYRTEC) 10 MG Tab 10/27/2021 Active   Cholecalciferol (VITAMIN D) 50 MCG (2000 UT) Cap 10/27/2021 Active   Cyanocobalamin (VITAMIN B 12 PO) 10/27/2021 Active   FLUoxetine (PROZAC) 10 MG Cap 10/27/2021 Active   ipratropium-albuterol (COMBIVENT RESPIMAT)  MCG/ACT Aero Soln 10/27/2021 Active   lisinopril (PRINIVIL) 40 MG tablet 10/27/2021 Active   metFORMIN (GLUCOPHAGE) 500 MG Tab 10/26/2021 Active   Multiple Vitamin (MULTIVITAMIN PO) 10/27/2021 Active                    ALLERGIES  No Known Allergies    PHYSICAL EXAM  VITAL SIGNS: /69   Pulse 74   Temp 36.9 °C (98.4 °F) (Temporal)   Resp 16   Ht 1.854 m (6' 1\")   Wt 86.2 kg (190 lb)   SpO2 97%   BMI 25.07 kg/m²     Constitutional: Well developed, No acute distress, Non-toxic appearance.   HENT: Normocephalic, Atraumatic, Bilateral external ears normal, Nose normal.   Eyes: PERRL, EOMI, Conjunctiva normal.   Neck: Normal range of motion, No tenderness, Supple.    Cardiovascular: Normal heart rate, Normal rhythm.   Thorax & Lungs: Normal breath sounds, No respiratory distress. No " wheezing, No chest tenderness.  Abdomen: Benign abdominal exam, no tenderness, no distention, no guarding, no rebound.   Skin: Warm, Dry, No erythema, No rash.   Back: No tenderness, No CVA tenderness.   Extremities: Intact distal pulses, No edema, No calf tenderness   Neurologic: Alert & oriented x 3, Normal motor function, Normal sensory function, No focal deficits noted.  Psychiatric: Appropriate                                                     DIAGNOSTIC STUDIES / PROCEDURES\    LABS  Results for orders placed or performed during the hospital encounter of 10/27/21   CBC with Differential   Result Value Ref Range    WBC 15.3 (H) 4.8 - 10.8 K/uL    RBC 4.44 (L) 4.70 - 6.10 M/uL    Hemoglobin 12.9 (L) 14.0 - 18.0 g/dL    Hematocrit 39.2 (L) 42.0 - 52.0 %    MCV 88.3 81.4 - 97.8 fL    MCH 29.1 27.0 - 33.0 pg    MCHC 32.9 (L) 33.7 - 35.3 g/dL    RDW 40.0 35.9 - 50.0 fL    Platelet Count 181 164 - 446 K/uL    MPV 12.7 9.0 - 12.9 fL    Neutrophils-Polys 82.50 (H) 44.00 - 72.00 %    Lymphocytes 12.30 (L) 22.00 - 41.00 %    Monocytes 4.50 0.00 - 13.40 %    Eosinophils 0.00 0.00 - 6.90 %    Basophils 0.20 0.00 - 1.80 %    Immature Granulocytes 0.50 0.00 - 0.90 %    Nucleated RBC 0.00 /100 WBC    Neutrophils (Absolute) 12.60 (H) 1.82 - 7.42 K/uL    Lymphs (Absolute) 1.88 1.00 - 4.80 K/uL    Monos (Absolute) 0.68 0.00 - 0.85 K/uL    Eos (Absolute) 0.00 0.00 - 0.51 K/uL    Baso (Absolute) 0.03 0.00 - 0.12 K/uL    Immature Granulocytes (abs) 0.08 0.00 - 0.11 K/uL    NRBC (Absolute) 0.00 K/uL   Complete Metabolic Panel (CMP)   Result Value Ref Range    Sodium 139 135 - 145 mmol/L    Potassium 4.9 3.6 - 5.5 mmol/L    Chloride 105 96 - 112 mmol/L    Co2 24 20 - 33 mmol/L    Anion Gap 10.0 7.0 - 16.0    Glucose 138 (H) 65 - 99 mg/dL    Bun 31 (H) 8 - 22 mg/dL    Creatinine 1.04 0.50 - 1.40 mg/dL    Calcium 10.0 8.5 - 10.5 mg/dL    AST(SGOT) 21 12 - 45 U/L    ALT(SGPT) 20 2 - 50 U/L    Alkaline Phosphatase 103 (H) 30 - 99 U/L     Total Bilirubin 0.3 0.1 - 1.5 mg/dL    Albumin 4.6 3.2 - 4.9 g/dL    Total Protein 7.1 6.0 - 8.2 g/dL    Globulin 2.5 1.9 - 3.5 g/dL    A-G Ratio 1.8 g/dL   Troponin   Result Value Ref Range    Troponin T 10 6 - 19 ng/L   ESTIMATED GFR   Result Value Ref Range    GFR If African American >60 >60 mL/min/1.73 m 2    GFR If Non African American >60 >60 mL/min/1.73 m 2   D-DIMER   Result Value Ref Range    D-Dimer Screen 0.30 0.00 - 0.50 ug/mL (FEU)   HEMOGLOBIN A1C   Result Value Ref Range    Glycohemoglobin 6.1 (H) 4.0 - 5.6 %    Est Avg Glucose 128 mg/dL   PROCALCITONIN   Result Value Ref Range    Procalcitonin <0.05 <0.25 ng/mL   Troponin - STAT Once   Result Value Ref Range    Troponin T 9 6 - 19 ng/L   TROPONIN   Result Value Ref Range    Troponin T 9 6 - 19 ng/L   EKG   Result Value Ref Range    Report       [facility] Emergency [facility]    Test Date:  2021-10-27  Pt Name:    OCTAVIANO MOREL                Department: ER  MRN:        [id number]                      Room:       ORTHO  Gender:     Male                         Technician: [id number]  :        1945                   Requested By:ER TRIAGE PROTOCOL  Order #:    [id number]                    Reading MD: Mariola Williamson MD    Measurements  Intervals                                Axis  Rate:       77                           P:          64  KY:         136                          QRS:        47  QRSD:       156                          T:          19  QT:         436  QTc:        494    Interpretive Statements  SINUS RHYTHM  RIGHT BUNDLE BRANCH BLOCK  BASELINE WANDER IN LEAD(S) I,III,aVR,aVL,aVF  No previous ECG available for comparison  Electronically Signed On 10- 12:30:34 PDT by Mariola Williamson MD     EKG - STAT Once   Result Value Ref Range    Report       [facility]    Test Date:  2021-10-27  Pt Name:    OCTAVIANO MOREL                Department: ER  MRN:        [id number]                      Room:        T204  Gender:     Male                         Technician: [id number]  :        1945                   Requested By:SRINIVASA GIL  Order #:    [id number]                    Reading MD:    Measurements  Intervals                                Axis  Rate:       63                           P:          67  KY:         140                          QRS:        41  QRSD:       154                          T:          15  QT:         448  QTc:        459    Interpretive Statements  SINUS RHYTHM  RIGHT BUNDLE BRANCH BLOCK  Compared to ECG 10/27/2021 11:37:29  No significant changes        All labs reviewed by me.    EKG  12 lead EKG interpreted by me as noted above.    RADIOLOGY  DX-CHEST-PORTABLE (1 VIEW)   Final Result      No acute cardiac or pulmonary abnormalities are identified.      EC-ECHOCARDIOGRAM COMPLETE W/O CONT    (Results Pending)   NM-CARDIAC STRESS TEST    (Results Pending)     The radiologist's interpretation of all radiological studies have been reviewed by me.    COURSE & MEDICAL DECISION MAKING  Nursing notes, VS, PMSFHx reviewed in chart.     Patient presents to emergency department with exertional chest pain.  Review of the chart does not show any previous echo or stress test.  Patient has been having some mild shortness of breath of the last few days.  Does have a history of asthma but states this seems slightly different.  Today while raking leaves he developed chest pain.  EKG does not show evidence of an acute MI but does show evidence of a right bundle branch block.  No other EKG for comparison.  Due to his shortness of breath complaints I will also add a D-dimer although I think PE is unlikely.  He is not hypoxic here any acute respiratory distress.  Patient has not had any cough or fevers to suggest an infectious etiology.    12:17 PM Patient seen and examined at bedside. The patient presents with chest pain and shortness of breath and the differential diagnosis includes but is not  limited to ACS, PE, Asthma, Pneumonia. The patient has a heart score of 6. Ordered for DX-Chest, CBC with diff., CMP, Troponin and EKG to evaluate. Patient was treated with Aspirin 324 mg for his symptoms.    Patient's chest x-ray is normal.  Initial troponin was normal.    1:39 PM Patient was reevaluated at bedside. Discussed lab and radiology results with the patient and informed them that they are reassuring. Informed him that there is still a concern with a heart issue, which requires further monitoring. Updated on plan of hospitalization. He is understandable and agreeable with the plan of hospitalization.    2:00 PM Paged Hospitalist.    2:15 PM I discussed the patient's case and the above findings with Dr. Major (Hospitalist) who agrees to hospitalize the patient    DISPOSITION:  Patient will be hospitalized by Dr. Major in guarded condition.     FINAL IMPRESSION  1. Acute chest pain          Uriel JACOB (Scribe), am scribing for, and in the presence of, Mariola Dudley M.D..    Electronically signed by: Uriel Streeter (Scribe), 10/27/2021    IMariola M.D. personally performed the services described in this documentation, as scribed by Uriel Streeter in my presence, and it is both accurate and complete.    The note accurately reflects work and decisions made by me.  Mariola Dudley M.D.  10/27/2021  8:24 PM     C

## 2021-10-27 NOTE — ED NOTES
Pt reports he had sudden onset of chest pain, left upper chest, notes sharp non radiating. Denies dizziness, diaphoresis, or nausea just SOB with pain. Pt used inhaler at home with easing of SOB. Pain only lasted for a few minutes. Pt denies pain at this time.

## 2021-10-27 NOTE — PROGRESS NOTES
Received patient at 1530 from MARIA DEL ROSARIO Ruelas. Report received. Patient to yellow 62. Epic downtime protocols in place, paper charting.

## 2021-10-27 NOTE — ASSESSMENT & PLAN NOTE
No exacerbation and no wheezing  Chest x-ray, reviewed personally, no acute infiltration or findings  Continue home inhalers

## 2021-10-27 NOTE — ASSESSMENT & PLAN NOTE
Sharp pain, 10 on 10  No ischemic changes on EKG and troponin normal  D-dimer is normal, less likely to be PE, no need for CTA  Heart score is 5, moderate  Telemetry and trending troponin  Echo for dizziness  Stress test  Aspirin and atorvastatin  Consider beta-blockers if needed  Nitroglycerin for chest pain as needed

## 2021-10-27 NOTE — PROGRESS NOTES
Report called to MARIA DEL ROSARIO Waddell in CDU. All questions answered. RN to come transport patient on zoll monitor.

## 2021-10-27 NOTE — ASSESSMENT & PLAN NOTE
On admission was not controlled 150/90  Patient states his blood pressure around 120/80 at home  Continue home medication amlodipine and lisinopril  Adjust the medication and add metoprolol if needed

## 2021-10-27 NOTE — ASSESSMENT & PLAN NOTE
No fever and no signs of infection  No need for antibiotics at this time  Likely related to stress and history of taking steroid  Check procalcitonin and repeat labs tomorrow

## 2021-10-28 ENCOUNTER — APPOINTMENT (OUTPATIENT)
Dept: RADIOLOGY | Facility: MEDICAL CENTER | Age: 76
End: 2021-10-28
Attending: INTERNAL MEDICINE
Payer: MEDICARE

## 2021-10-28 ENCOUNTER — PATIENT OUTREACH (OUTPATIENT)
Dept: HEALTH INFORMATION MANAGEMENT | Facility: OTHER | Age: 76
End: 2021-10-28

## 2021-10-28 ENCOUNTER — APPOINTMENT (OUTPATIENT)
Dept: CARDIOLOGY | Facility: MEDICAL CENTER | Age: 76
End: 2021-10-28
Attending: STUDENT IN AN ORGANIZED HEALTH CARE EDUCATION/TRAINING PROGRAM
Payer: MEDICARE

## 2021-10-28 VITALS
SYSTOLIC BLOOD PRESSURE: 156 MMHG | OXYGEN SATURATION: 95 % | HEART RATE: 66 BPM | DIASTOLIC BLOOD PRESSURE: 73 MMHG | BODY MASS INDEX: 25.57 KG/M2 | WEIGHT: 192.9 LBS | RESPIRATION RATE: 20 BRPM | TEMPERATURE: 98.1 F | HEIGHT: 73 IN

## 2021-10-28 LAB
ANION GAP SERPL CALC-SCNC: 11 MMOL/L (ref 7–16)
BASOPHILS # BLD AUTO: 0.1 % (ref 0–1.8)
BASOPHILS # BLD: 0.01 K/UL (ref 0–0.12)
BUN SERPL-MCNC: 31 MG/DL (ref 8–22)
CALCIUM SERPL-MCNC: 9.3 MG/DL (ref 8.5–10.5)
CHLORIDE SERPL-SCNC: 104 MMOL/L (ref 96–112)
CHOLEST SERPL-MCNC: 142 MG/DL (ref 100–199)
CO2 SERPL-SCNC: 23 MMOL/L (ref 20–33)
CREAT SERPL-MCNC: 1.09 MG/DL (ref 0.5–1.4)
EKG IMPRESSION: NORMAL
EOSINOPHIL # BLD AUTO: 0.01 K/UL (ref 0–0.51)
EOSINOPHIL NFR BLD: 0.1 % (ref 0–6.9)
ERYTHROCYTE [DISTWIDTH] IN BLOOD BY AUTOMATED COUNT: 39.7 FL (ref 35.9–50)
GLUCOSE SERPL-MCNC: 135 MG/DL (ref 65–99)
HCT VFR BLD AUTO: 36.7 % (ref 42–52)
HDLC SERPL-MCNC: 56 MG/DL
HGB BLD-MCNC: 12.4 G/DL (ref 14–18)
IMM GRANULOCYTES # BLD AUTO: 0.06 K/UL (ref 0–0.11)
IMM GRANULOCYTES NFR BLD AUTO: 0.5 % (ref 0–0.9)
LDLC SERPL CALC-MCNC: 63 MG/DL
LV EJECT FRACT  99904: 60
LV EJECT FRACT MOD 2C 99903: 61.57
LV EJECT FRACT MOD 4C 99902: 63.25
LV EJECT FRACT MOD BP 99901: 62.58
LYMPHOCYTES # BLD AUTO: 2.05 K/UL (ref 1–4.8)
LYMPHOCYTES NFR BLD: 15.5 % (ref 22–41)
MAGNESIUM SERPL-MCNC: 2.1 MG/DL (ref 1.5–2.5)
MCH RBC QN AUTO: 29.5 PG (ref 27–33)
MCHC RBC AUTO-ENTMCNC: 33.8 G/DL (ref 33.7–35.3)
MCV RBC AUTO: 87.2 FL (ref 81.4–97.8)
MONOCYTES # BLD AUTO: 0.67 K/UL (ref 0–0.85)
MONOCYTES NFR BLD AUTO: 5.1 % (ref 0–13.4)
NEUTROPHILS # BLD AUTO: 10.45 K/UL (ref 1.82–7.42)
NEUTROPHILS NFR BLD: 78.7 % (ref 44–72)
NRBC # BLD AUTO: 0 K/UL
NRBC BLD-RTO: 0 /100 WBC
PLATELET # BLD AUTO: 173 K/UL (ref 164–446)
PMV BLD AUTO: 12 FL (ref 9–12.9)
POTASSIUM SERPL-SCNC: 4.5 MMOL/L (ref 3.6–5.5)
PROCALCITONIN SERPL-MCNC: <0.05 NG/ML
RBC # BLD AUTO: 4.21 M/UL (ref 4.7–6.1)
SODIUM SERPL-SCNC: 138 MMOL/L (ref 135–145)
TRIGL SERPL-MCNC: 117 MG/DL (ref 0–149)
TROPONIN T SERPL-MCNC: 9 NG/L (ref 6–19)
WBC # BLD AUTO: 13.3 K/UL (ref 4.8–10.8)

## 2021-10-28 PROCEDURE — 80048 BASIC METABOLIC PNL TOTAL CA: CPT

## 2021-10-28 PROCEDURE — 93010 ELECTROCARDIOGRAM REPORT: CPT | Mod: 76 | Performed by: INTERNAL MEDICINE

## 2021-10-28 PROCEDURE — 80061 LIPID PANEL: CPT

## 2021-10-28 PROCEDURE — 85025 COMPLETE CBC W/AUTO DIFF WBC: CPT

## 2021-10-28 PROCEDURE — 93306 TTE W/DOPPLER COMPLETE: CPT | Mod: 26 | Performed by: INTERNAL MEDICINE

## 2021-10-28 PROCEDURE — 700111 HCHG RX REV CODE 636 W/ 250 OVERRIDE (IP): Performed by: INTERNAL MEDICINE

## 2021-10-28 PROCEDURE — A9270 NON-COVERED ITEM OR SERVICE: HCPCS | Performed by: INTERNAL MEDICINE

## 2021-10-28 PROCEDURE — 84145 PROCALCITONIN (PCT): CPT

## 2021-10-28 PROCEDURE — 96372 THER/PROPH/DIAG INJ SC/IM: CPT | Mod: XU

## 2021-10-28 PROCEDURE — G0378 HOSPITAL OBSERVATION PER HR: HCPCS

## 2021-10-28 PROCEDURE — A9502 TC99M TETROFOSMIN: HCPCS

## 2021-10-28 PROCEDURE — 36415 COLL VENOUS BLD VENIPUNCTURE: CPT

## 2021-10-28 PROCEDURE — 700102 HCHG RX REV CODE 250 W/ 637 OVERRIDE(OP)

## 2021-10-28 PROCEDURE — 83735 ASSAY OF MAGNESIUM: CPT

## 2021-10-28 PROCEDURE — 99217 PR OBSERVATION CARE DISCHARGE: CPT | Performed by: STUDENT IN AN ORGANIZED HEALTH CARE EDUCATION/TRAINING PROGRAM

## 2021-10-28 PROCEDURE — 700102 HCHG RX REV CODE 250 W/ 637 OVERRIDE(OP): Performed by: INTERNAL MEDICINE

## 2021-10-28 PROCEDURE — 93306 TTE W/DOPPLER COMPLETE: CPT

## 2021-10-28 PROCEDURE — 93005 ELECTROCARDIOGRAM TRACING: CPT | Performed by: STUDENT IN AN ORGANIZED HEALTH CARE EDUCATION/TRAINING PROGRAM

## 2021-10-28 PROCEDURE — A9270 NON-COVERED ITEM OR SERVICE: HCPCS

## 2021-10-28 PROCEDURE — 84484 ASSAY OF TROPONIN QUANT: CPT

## 2021-10-28 RX ORDER — AMLODIPINE BESYLATE 5 MG/1
10 TABLET ORAL DAILY
Qty: 30 TABLET | Refills: 0 | Status: SHIPPED | OUTPATIENT
Start: 2021-10-29 | End: 2023-01-16

## 2021-10-28 RX ORDER — LEVALBUTEROL INHALATION SOLUTION 1.25 MG/3ML
1.25 SOLUTION RESPIRATORY (INHALATION)
Status: DISCONTINUED | OUTPATIENT
Start: 2021-10-28 | End: 2021-10-28 | Stop reason: HOSPADM

## 2021-10-28 RX ORDER — MORPHINE SULFATE 4 MG/ML
4 INJECTION, SOLUTION INTRAMUSCULAR; INTRAVENOUS EVERY 4 HOURS PRN
Status: DISCONTINUED | OUTPATIENT
Start: 2021-10-28 | End: 2021-10-28 | Stop reason: HOSPADM

## 2021-10-28 RX ORDER — AMLODIPINE BESYLATE 5 MG/1
5 TABLET ORAL ONCE
Status: DISCONTINUED | OUTPATIENT
Start: 2021-10-28 | End: 2021-10-28 | Stop reason: HOSPADM

## 2021-10-28 RX ORDER — ALBUTEROL SULFATE 90 UG/1
AEROSOL, METERED RESPIRATORY (INHALATION)
Status: DISCONTINUED
Start: 2021-10-28 | End: 2021-10-28 | Stop reason: HOSPADM

## 2021-10-28 RX ORDER — MORPHINE SULFATE 4 MG/ML
2 INJECTION, SOLUTION INTRAMUSCULAR; INTRAVENOUS EVERY 4 HOURS PRN
Status: DISCONTINUED | OUTPATIENT
Start: 2021-10-28 | End: 2021-10-28 | Stop reason: HOSPADM

## 2021-10-28 RX ADMIN — AMLODIPINE BESYLATE 5 MG: 5 TABLET ORAL at 06:55

## 2021-10-28 RX ADMIN — ASPIRIN 325 MG ORAL TABLET 325 MG: 325 PILL ORAL at 06:54

## 2021-10-28 RX ADMIN — ENOXAPARIN SODIUM 80 MG: 80 INJECTION SUBCUTANEOUS at 00:22

## 2021-10-28 RX ADMIN — LISINOPRIL 40 MG: 20 TABLET ORAL at 06:54

## 2021-10-28 NOTE — HOSPITAL COURSE
76-year-old male with history of asthma diabetes hyperlipidemia and hypertension presented 10/27 with chest pain, the pain started today when he was working at his backyard, sharp pain on the left of his chest, did not move, 10 on 10, associated with shortness of breath and nausea however no vomiting, denied fever or chills, denied any urinary or bowel symptoms, patient states he has had some dizziness especially when he moves fast, no palpitation or loss of consciousness, on admission his blood pressure was not controlled 150/90, EKG did not show any ischemic changes however showed right bundle branch block, troponin was 10 and D-dimer was normal, patient will be admitted to the hospital for chest rule out and stress test.

## 2021-10-28 NOTE — DISCHARGE INSTRUCTIONS
Discharge Instructions    Discharged to home by car with relative. Discharged via wheelchair, hospital escort: Yes.  Special equipment needed: Not Applicable    Be sure to schedule a follow-up appointment with your primary care doctor or any specialists as instructed.     Discharge Plan:   Diet Plan: Discussed  Activity Level: Discussed  Confirmed Follow up Appointment: Patient to Call and Schedule Appointment  Confirmed Symptoms Management: Discussed  Medication Reconciliation Updated: Yes    I understand that a diet low in cholesterol, fat, and sodium is recommended for good health. Unless I have been given specific instructions below for another diet, I accept this instruction as my diet prescription.   Other diet: Heart Healthy     Special Instructions: None    · Is patient discharged on Warfarin / Coumadin?   No     Depression / Suicide Risk    As you are discharged from this [facility] facility, it is important to learn how to keep safe from harming yourself.    Recognize the warning signs:  · Abrupt changes in personality, positive or negative- including increase in energy   · Giving away possessions  · Change in eating patterns- significant weight changes-  positive or negative  · Change in sleeping patterns- unable to sleep or sleeping all the time   · Unwillingness or inability to communicate  · Depression  · Unusual sadness, discouragement and loneliness  · Talk of wanting to die  · Neglect of personal appearance   · Rebelliousness- reckless behavior  · Withdrawal from people/activities they love  · Confusion- inability to concentrate     If you or a loved one observes any of these behaviors or has concerns about self-harm, here's what you can do:  · Talk about it- your feelings and reasons for harming yourself  · Remove any means that you might use to hurt yourself (examples: pills, rope, extension cords, firearm)  · Get professional help from the community (Mental Health, Substance Abuse,  psychological counseling)  · Do not be alone:Call your Safe Contact- someone whom you trust who will be there for you.  · Call your local CRISIS HOTLINE 948-8530 or 797-953-1013  · Call your local Children's Mobile Crisis Response Team [place] (621) 229-5965 or www.Deem  · Call the toll free National Suicide Prevention Hotlines   · National Suicide Prevention Lifeline 606-095-YEGO (1160)  · [employer] Network 800-SUICIDE (161-3139)          Chest Pain Observation  It is often hard to give a specific diagnosis for the cause of chest pain. Among other possibilities your symptoms might be caused by inadequate oxygen delivery to your heart (angina). Angina that is not treated or evaluated can lead to a heart attack (myocardial infarction) or death.  Blood tests, electrocardiograms, and X-rays may have been done to help determine a possible cause of your chest pain. After evaluation and observation, your health care provider has determined that it is unlikely your pain was caused by an unstable condition that requires hospitalization. However, a full evaluation of your pain may need to be completed, with additional diagnostic testing as directed. It is very important to keep your follow-up appointments. Not keeping your follow-up appointments could result in permanent heart damage, disability, or death. If there is any problem keeping your follow-up appointments, you must call your health care provider.  HOME CARE INSTRUCTIONS   Due to the slight chance that your pain could be angina, it is important to follow your health care provider's treatment plan and also maintain a healthy lifestyle:  · Maintain or work toward achieving a healthy weight.  · Stay physically active and exercise regularly.  · Decrease your salt intake.  · Eat a balanced, healthy diet. Talk to a dietitian to learn about heart-healthy foods.  · Increase your fiber intake by including whole grains, vegetables, fruits, and nuts  in your diet.  · Avoid situations that cause stress, anger, or depression.  · Take medicines as advised by your health care provider. Report any side effects to your health care provider. Do not stop medicines or adjust the dosages on your own.  · Quit smoking. Do not use nicotine patches or gum until you check with your health care provider.  · Keep your blood pressure, blood sugar, and cholesterol levels within normal limits.  · Limit alcohol intake to no more than 1 drink per day for women who are not pregnant and 2 drinks per day for men.  · Do not abuse drugs.  SEEK IMMEDIATE MEDICAL CARE IF:  You have severe chest pain or pressure which may include symptoms such as:  · You feel pain or pressure in your arms, neck, jaw, or back.  · You have severe back or abdominal pain, feel sick to your stomach (nauseous), or throw up (vomit).  · You are sweating profusely.  · You are having a fast or irregular heartbeat.  · You feel short of breath while at rest.  · You notice increasing shortness of breath during rest, sleep, or with activity.  · You have chest pain that does not get better after rest or after taking your usual medicine.  · You wake from sleep with chest pain.  · You are unable to sleep because you cannot breathe.  · You develop a frequent cough or you are coughing up blood.  · You feel dizzy, faint, or experience extreme fatigue.  · You develop severe weakness, dizziness, fainting, or chills.  Any of these symptoms may represent a serious problem that is an emergency. Do not wait to see if the symptoms will go away. Call your local emergency services (911 in the [place] Do not drive yourself to the hospital.  MAKE SURE YOU:  · Understand these instructions.  · Will watch your condition.  · Will get help right away if you are not doing well or get worse.     This information is not intended to replace advice given to you by your health care provider. Make sure you discuss any questions you have with your  health care provider.     Document Released: 01/20/2012 Document Revised: 12/23/2014 Document Reviewed: 06/19/2014  [employer] Patient Education ©2016 [employer]        Muscle Pain, Adult  Muscle pain (myalgia) may be mild or severe. In most cases, the pain lasts only a short time and it goes away without treatment. It is normal to feel some muscle pain after starting a workout program. Muscles that have not been used often will be sore at first.  Muscle pain may also be caused by many other things, including:  · Overuse or muscle strain, especially if you are not in shape. This is the most common cause of muscle pain.  · Injury.  · Bruises.  · Viruses, such as the flu.  · Infectious diseases.  · A chronic condition that causes muscle tenderness, fatigue, and headache (fibromyalgia).  · A condition, such as lupus, in which the body’s disease-fighting system attacks other organs in the body (autoimmune or rheumatologic diseases).  · Certain drugs, including ACE inhibitors and statins.  To diagnose the cause of your muscle pain, your health care provider will do a physical exam and ask questions about the pain and when it began. If you have not had muscle pain for very long, your health care provider may want to wait before doing much testing. If your muscle pain has lasted a long time, your health care provider may want to run tests right away. In some cases, this may include tests to rule out certain conditions or illnesses.  Treatment for muscle pain depends on the cause. Home care is often enough to relieve muscle pain. Your health care provider may also prescribe anti-inflammatory medicine.  Follow these instructions at home:  Activity  · If overuse is causing your muscle pain:  ? Slow down your activities until the pain goes away.  ? Do regular, gentle exercises if you are not usually active.  ? Warm up before exercising. Stretch before and after exercising. This can help lower the risk of muscle  pain.  · Do not continue working out if the pain is very bad. Bad pain could mean that you have injured a muscle.  Managing pain and discomfort    · If directed, apply ice to the sore muscle:  ? Put ice in a plastic bag.  ? Place a towel between your skin and the bag.  ? Leave the ice on for 20 minutes, 2-3 times a day.  · You may also alternate between applying ice and applying heat as told by your health care provider. To apply heat, use the heat source that your health care provider recommends, such as a moist heat pack or a heating pad.  ? Place a towel between your skin and the heat source.  ? Leave the heat on for 20-30 minutes.  ? Remove the heat if your skin turns bright red. This is especially important if you are unable to feel pain, heat, or cold. You may have a greater risk of getting burned.  Medicines  · Take over-the-counter and prescription medicines only as told by your health care provider.  · Do not drive or use heavy machinery while taking prescription pain medicine.  Contact a health care provider if:  · Your muscle pain gets worse and medicines do not help.  · You have muscle pain that lasts longer than 3 days.  · You have a rash or fever along with muscle pain.  · You have muscle pain after a tick bite.  · You have muscle pain while working out, even though you are in good physical condition.  · You have redness, soreness, or swelling along with muscle pain.  · You have muscle pain after starting a new medicine or changing the dose of a medicine.  Get help right away if:  · You have trouble breathing.  · You have trouble swallowing.  · You have muscle pain along with a stiff neck, fever, and vomiting.  · You have severe muscle weakness or cannot move part of your body.  This information is not intended to replace advice given to you by your health care provider. Make sure you discuss any questions you have with your health care provider.  Document Released: 11/09/2007 Document Revised:  11/30/2018 Document Reviewed: 05/09/2017  Elsevier Patient Education © 2020 [employer]

## 2021-10-28 NOTE — PROGRESS NOTES
Repeat EKG showed ST depression on anterior leads V2 and V3, patient denied any chest pain and troponin has been negative, will switch Lovenox to therapeutic dose 90 mg twice daily, reassess him tomorrow for possible cardiology consult before stress test.  Risk/benefit from  anticoagulation was discussed with the patient he agreed to start with Lovenox twice daily.

## 2021-10-28 NOTE — PROGRESS NOTES
Patient called this RN to room and was complaining of 7/10 chest pain. Vitals obtained (BP:177/78, HR: 55, Temp: 97.6, RR: 20, O2: 95). EKG obtained showed right bundle branch block. Patient given scheduled blood pressure medications. Dr. Day notified, new orders received.

## 2021-10-28 NOTE — PROGRESS NOTES
Monitor Summary    Sinus Ryhthm - Sinus Chato with PACs and PVCs  Heart Rate: 56-71  .17/.13/.46

## 2021-10-28 NOTE — PROGRESS NOTES
Report received from MARIA DEL ROSARIO Roberts.  Patient was brought up to the CDU from the Yellow Pod of the ED via gurney with a tele box and an ACLS RN.  Patient A & O x 4.  No apparent signs of distress.  Safety precautions in place.  Patient educated to call for assistance.  Hourly rounding in place.  COVID 19 Surge in effect.

## 2021-10-28 NOTE — DISCHARGE SUMMARY
Discharge Summary    CHIEF COMPLAINT ON ADMISSION  Chief Complaint   Patient presents with   • Chest Pain     sudden onset while doing yeard work 1 hour ago. hx of asthma. no cardiac hx.    • Shortness of Breath       Reason for Admission  [facility]     Admission Date  10/27/2021    CODE STATUS  Full Code    HPI & HOSPITAL COURSE  This is a 76 y.o. male here with left-sided chest pain  76-year-old male with history of asthma diabetes hyperlipidemia and hypertension presented 10/27 with chest pain, the pain started today when he was working at his backyard, sharp pain on the left of his chest, did not move, 10 on 10, associated with shortness of breath and nausea however no vomiting, denied fever or chills, denied any urinary or bowel symptoms, patient states he has had some dizziness especially when he moves fast, no palpitation or loss of consciousness, on admission his blood pressure was not controlled 150/90, EKG did not show any ischemic changes however showed right bundle branch block, troponin was 10 and D-dimer was normal, patient will be admitted to the hospital for chest rule out and stress test.    Patient underwent echocardiogram which was within normal limits furthermore patient also had cardiac enzyme testing x3 also which was within normal limits lastly patient also had treadmill stress test which was within normal limits.  It should be noted the patient did have issues with elevated blood pressure I have increased his amlodipine from 5 mg to 10 mg.  Therefore, he is discharged in good and stable condition to home with close outpatient follow-up.    The patient recovered much more quickly than anticipated on admission.    Discharge Date  10/28/2021    FOLLOW UP ITEMS POST DISCHARGE  Take all medication prescribed at all follow-up points as indicated    DISCHARGE DIAGNOSES  Principal Problem:    Pain in the chest POA: Unknown  Active Problems:    DM (diabetes mellitus) type II controlled peripheral  vascular disorder POA: Yes      Overview: IMO Update    Essential hypertension POA: Yes    Hyperlipidemia LDL goal <70 POA: Yes    Dizziness POA: Unknown    Moderate asthma POA: Unknown    Leukocytosis POA: Unknown  Resolved Problems:    * No resolved hospital problems. *      FOLLOW UP    Denver J Miller, M.D.  [address] [facility]  [place] [address]  [phone]    Schedule an appointment as soon as possible for a visit  As needed      MEDICATIONS ON DISCHARGE     Medication List      CHANGE how you take these medications      Instructions   * amLODIPine 10 MG Tabs  What changed: Another medication with the same name was added. Make sure you understand how and when to take each.  Commonly known as: NORVASC   Take 10 mg by mouth every day.  Dose: 10 mg     * amLODIPine 5 MG Tabs  Start taking on: October 29, 2021  What changed: You were already taking a medication with the same name, and this prescription was added. Make sure you understand how and when to take each.  Commonly known as: NORVASC   Take 2 Tablets by mouth every day.  Dose: 10 mg     atorvastatin 80 MG tablet  What changed: when to take this  Commonly known as: LIPITOR   TAKE 1 TABLET BY MOUTH EVERY DAY         * This list has 2 medication(s) that are the same as other medications prescribed for you. Read the directions carefully, and ask your doctor or other care provider to review them with you.            CONTINUE taking these medications      Instructions   aspirin EC 81 MG Tbec  Commonly known as: ECOTRIN   Take 162 mg by mouth at bedtime.  Dose: 162 mg     Blood Glucose Test Strips   1 Device by Does not apply route 2 times a day as needed. Test strips order: Test strips for free style lite meter  Dose: 1 Device     CALCIUM 500 PO   Take 500 mg by mouth every Monday, Wednesday, and Friday.  Dose: 500 mg     cetirizine 10 MG Tabs  Commonly known as: ZYRTEC   Take 10 mg by mouth as needed for Allergies.  Dose: 10 mg     FLUoxetine 10 MG  Caps  Commonly known as: PROZAC   Take 10 mg by mouth every day.  Dose: 10 mg     ipratropium-albuterol  MCG/ACT Aers  Commonly known as: COMBIVENT RESPIMAT   Inhale 1 Puff 1 time a day as needed (Shortness of breath).  Dose: 1 Puff     lisinopril 40 MG tablet  Commonly known as: PRINIVIL   Take 40 mg by mouth every day.  Dose: 40 mg     metFORMIN 500 MG Tabs  Commonly known as: GLUCOPHAGE   Take 500 mg by mouth every 48 hours.  Dose: 500 mg     MULTIVITAMIN PO   Take 1 Tablet by mouth every day.  Dose: 1 Tablet     TYLENOL PO   Take 2 Tablets by mouth every day.  Dose: 2 Tablet     Vitamin D 50 MCG (2000 UT) Caps   Take 2,000 Units by mouth every day.  Dose: 2,000 Units        STOP taking these medications    VITAMIN B 12 PO            Allergies  No Known Allergies    DIET  Orders Placed This Encounter   Procedures   • Diet Order Diet: Cardiac     Standing Status:   Standing     Number of Occurrences:   1     Order Specific Question:   Diet:     Answer:   Cardiac [6]       ACTIVITY  As tolerated.  Weight bearing as tolerated    CONSULTATIONS  None    PROCEDURES  None    LABORATORY  Lab Results   Component Value Date    SODIUM 138 10/28/2021    POTASSIUM 4.5 10/28/2021    CHLORIDE 104 10/28/2021    CO2 23 10/28/2021    GLUCOSE 135 (H) 10/28/2021    BUN 31 (H) 10/28/2021    CREATININE 1.09 10/28/2021        Lab Results   Component Value Date    WBC 13.3 (H) 10/28/2021    HEMOGLOBIN 12.4 (L) 10/28/2021    HEMATOCRIT 36.7 (L) 10/28/2021    PLATELETCT 173 10/28/2021        Total time of the discharge process exceeds 35 minutes.

## 2021-10-28 NOTE — PROGRESS NOTES
Covid - 19 surge in effect.    Patient is AOx4, denies any sob, n/v, or pain. Sinus rhythm on tele monitor, VSS. Patient updated on plan of care, all needs met at this time. Bed locked and in lowest position. Call light and personal belongings within reach.

## 2021-12-28 ENCOUNTER — OFFICE VISIT (OUTPATIENT)
Dept: URGENT CARE | Facility: PHYSICIAN GROUP | Age: 76
End: 2021-12-28
Payer: MEDICARE

## 2021-12-28 ENCOUNTER — HOSPITAL ENCOUNTER (OUTPATIENT)
Dept: RADIOLOGY | Facility: MEDICAL CENTER | Age: 76
End: 2021-12-28
Attending: NURSE PRACTITIONER
Payer: MEDICARE

## 2021-12-28 VITALS
HEIGHT: 73 IN | TEMPERATURE: 98.1 F | RESPIRATION RATE: 14 BRPM | WEIGHT: 196 LBS | DIASTOLIC BLOOD PRESSURE: 62 MMHG | HEART RATE: 61 BPM | BODY MASS INDEX: 25.98 KG/M2 | OXYGEN SATURATION: 97 % | SYSTOLIC BLOOD PRESSURE: 122 MMHG

## 2021-12-28 DIAGNOSIS — M25.561 ACUTE PAIN OF RIGHT KNEE: ICD-10-CM

## 2021-12-28 PROCEDURE — 99213 OFFICE O/P EST LOW 20 MIN: CPT | Performed by: NURSE PRACTITIONER

## 2021-12-28 PROCEDURE — 73564 X-RAY EXAM KNEE 4 OR MORE: CPT | Mod: RT

## 2021-12-28 ASSESSMENT — ENCOUNTER SYMPTOMS
MYALGIAS: 1
FALLS: 0
NECK PAIN: 0
BACK PAIN: 0
SENSORY CHANGE: 0
FOCAL WEAKNESS: 1

## 2021-12-28 ASSESSMENT — FIBROSIS 4 INDEX: FIB4 SCORE: 2.06

## 2021-12-28 NOTE — PATIENT INSTRUCTIONS
Acute Knee Pain, Adult  Acute knee pain is sudden and may be caused by damage, swelling, or irritation of the muscles and tissues that support your knee. The injury may result from:  · A fall.  · An injury to your knee from twisting motions.  · A hit to the knee.  · Infection.  Acute knee pain may go away on its own with time and rest. If it does not, your health care provider may order tests to find the cause of the pain. These may include:  · Imaging tests, such as an X-ray, MRI, or ultrasound.  · Joint aspiration. In this test, fluid is removed from the knee.  · Arthroscopy. In this test, a lighted tube is inserted into the knee and an image is projected onto a TV screen.  · Biopsy. In this test, a sample of tissue is removed from the body and studied under a microscope.  Follow these instructions at home:  Pay attention to any changes in your symptoms. Take these actions to relieve your pain.  If you have a knee sleeve or brace:    · Wear the sleeve or brace as told by your health care provider. Remove it only as told by your health care provider.  · Loosen the sleeve or brace if your toes tingle, become numb, or turn cold and blue.  · Keep the sleeve or brace clean.  · If the sleeve or brace is not waterproof:  ? Do not let it get wet.  ? Cover it with a watertight covering when you take a bath or shower.  Activity  · Rest your knee.  · Do not do things that cause pain or make pain worse.  · Avoid high-impact activities or exercises, such as running, jumping rope, or doing jumping jacks.  · Work with a physical therapist to make a safe exercise program, as recommended by your health care provider. Do exercises as told by your physical therapist.  Managing pain, stiffness, and swelling    · If directed, put ice on the knee:  ? Put ice in a plastic bag.  ? Place a towel between your skin and the bag.  ? Leave the ice on for 20 minutes, 2-3 times a day.  · If directed, use an elastic bandage to put pressure  (compression) on your injured knee. This may control swelling, give support, and help with discomfort.  General instructions  · Take over-the-counter and prescription medicines only as told by your health care provider.  · Raise (elevate) your knee above the level of your heart when you are sitting or lying down.  · Sleep with a pillow under your knee.  · Do not use any products that contain nicotine or tobacco, such as cigarettes, e-cigarettes, and chewing tobacco. These can delay healing. If you need help quitting, ask your health care provider.  · If you are overweight, work with your health care provider and a dietitian to set a weight-loss goal that is healthy and reasonable for you. Extra weight can put pressure on your knee.  · Keep all follow-up visits as told by your health care provider. This is important.  Contact a health care provider if:  · Your knee pain continues, changes, or gets worse.  · You have a fever along with knee pain.  · Your knee feels warm to the touch.  · Your knee kirill or locks up.  Get help right away if:  · Your knee swells, and the swelling becomes worse.  · You cannot move your knee.  · You have severe pain in your knee.  Summary  · Acute knee pain can be caused by a fall, an injury, an infection, or damage, swelling, or irritation of the tissues that support your knee.  · Your health care provider may perform tests to find out the cause of the pain.  · Pay attention to any changes in your symptoms. Relieve your pain with rest, medicines, light activity, and use of ice.  · Get help if your pain continues or becomes worse, your knee swells, or you cannot move your knee.  This information is not intended to replace advice given to you by your health care provider. Make sure you discuss any questions you have with your health care provider.  Document Released: 10/14/2008 Document Revised: 05/30/2019 Document Reviewed: 05/30/2019  Elsevier Patient Education © 2020 [employer]

## 2021-12-28 NOTE — PROGRESS NOTES
Subjective:     Mello Isabel is a 76 y.o. male who presents for Knee Pain (Twisted Knee. Sharp. Worse with movement)      Knee Pain  Associated symptoms include myalgias. Pertinent negatives include no neck pain.     Pt presents for evaluation of a new problem.  Jose Rafael is a pleasant 76-year-old male who presents to urgent care today after twisting his knee while shoveling snow 2 days ago.  He notes that he did not fall but believes he twisted his right knee laterally.  Since this time he has had pain and swelling that worsens with ambulation and standing.  He notes worsening pain going downstairs versus up.  He does complain about stiffness and instability.  He denies any clicking or popping.  No previous right knee injury.  He has been using ibuprofen, elevation and ice for his symptoms which provide mild relief.  His pain level is rated as a 7/10.    Review of Systems   Musculoskeletal: Positive for joint pain and myalgias. Negative for back pain, falls and neck pain.   Neurological: Positive for focal weakness. Negative for sensory change.       PMH:   Past Medical History:   Diagnosis Date   • Allergy to pollen    • Asthma, mild persistent    • DM (diabetes mellitus) type II controlled peripheral vascular disorder    • Essential hypertension    • Hyperlipidemia LDL goal <70    • TIA (transient ischemic attack) 2012    x2     ALLERGIES: No Known Allergies  SURGHX:   Past Surgical History:   Procedure Laterality Date   • CATARACT PHACO WITH IOL      gilbert eyes   • KNEE ARTHROSCOPY      x2 left   • RETINAL DETACHMENT REPAIR      gilbert eyes     SOCHX:   Social History     Socioeconomic History   • Marital status:      Spouse name: Not on file   • Number of children: Not on file   • Years of education: Not on file   • Highest education level: Not on file   Occupational History   • Not on file   Tobacco Use   • Smoking status: Former Smoker   • Smokeless tobacco: Never Used   Substance and Sexual Activity   •  "Alcohol use: No     Alcohol/week: 0.0 oz   • Drug use: No   • Sexual activity: Not on file   Other Topics Concern   • Not on file   Social History Narrative   • Not on file     Social Determinants of Health     Financial Resource Strain:    • Difficulty of Paying Living Expenses: Not on file   Food Insecurity:    • Worried About Running Out of Food in the Last Year: Not on file   • Ran Out of Food in the Last Year: Not on file   Transportation Needs:    • Lack of Transportation (Medical): Not on file   • Lack of Transportation (Non-Medical): Not on file   Physical Activity:    • Days of Exercise per Week: Not on file   • Minutes of Exercise per Session: Not on file   Stress:    • Feeling of Stress : Not on file   Social Connections:    • Frequency of Communication with Friends and Family: Not on file   • Frequency of Social Gatherings with Friends and Family: Not on file   • Attends Taoist Services: Not on file   • Active Member of Clubs or Organizations: Not on file   • Attends Club or Organization Meetings: Not on file   • Marital Status: Not on file   Intimate Partner Violence:    • Fear of Current or Ex-Partner: Not on file   • Emotionally Abused: Not on file   • Physically Abused: Not on file   • Sexually Abused: Not on file   Housing Stability:    • Unable to Pay for Housing in the Last Year: Not on file   • Number of Places Lived in the Last Year: Not on file   • Unstable Housing in the Last Year: Not on file     FH:   Family History   Problem Relation Age of Onset   • Cancer Mother         liver   • Stroke Father    • Hypertension Brother    • Hyperlipidemia Brother    • Diabetes Brother    • Hypertension Brother    • Hyperlipidemia Brother          Objective:   /62 (BP Location: Left arm, Patient Position: Sitting, BP Cuff Size: Adult)   Pulse 61   Temp 36.7 °C (98.1 °F) (Temporal)   Resp 14   Ht 1.854 m (6' 1\")   Wt 88.9 kg (196 lb)   SpO2 97%   BMI 25.86 kg/m²     Physical Exam  Vitals and " nursing note reviewed.   Constitutional:       General: He is not in acute distress.     Appearance: Normal appearance. He is not ill-appearing.   HENT:      Head: Normocephalic and atraumatic.      Right Ear: External ear normal.      Left Ear: External ear normal.      Nose: No congestion or rhinorrhea.      Mouth/Throat:      Mouth: Mucous membranes are moist.   Eyes:      Extraocular Movements: Extraocular movements intact.      Pupils: Pupils are equal, round, and reactive to light.   Cardiovascular:      Rate and Rhythm: Normal rate and regular rhythm.      Pulses: Normal pulses.      Heart sounds: Normal heart sounds.   Pulmonary:      Effort: Pulmonary effort is normal.      Breath sounds: Normal breath sounds.   Abdominal:      General: Abdomen is flat. Bowel sounds are normal.      Palpations: Abdomen is soft.      Tenderness: There is no abdominal tenderness. There is no right CVA tenderness or left CVA tenderness.   Musculoskeletal:      Cervical back: Normal range of motion and neck supple.      Right knee: Swelling and effusion present. No erythema or bony tenderness. Decreased range of motion. Tenderness present over the lateral joint line. No medial joint line or patellar tendon tenderness. No LCL laxity, MCL laxity, ACL laxity or PCL laxity. Normal alignment.   Skin:     General: Skin is warm and dry.      Capillary Refill: Capillary refill takes less than 2 seconds.   Neurological:      General: No focal deficit present.      Mental Status: He is alert and oriented to person, place, and time. Mental status is at baseline.   Psychiatric:         Mood and Affect: Mood normal.         Behavior: Behavior normal.         Thought Content: Thought content normal.         Judgment: Judgment normal.       Dx knee:   1.  Negative for right knee fracture or malalignment     2.  Probable joint effusion     3.  Diffuse atherosclerotic plaque     4.  Patellofemoral and medial femorotibial compartment  osteoarthritis  Assessment/Plan:   Assessment    1. Acute pain of right knee  DX-KNEE COMPLETE 4+ RIGHT    Referral to Orthopedics     X-ray results discussed with patient.  I personally reviewed his x-ray and agree with radiologist interpretation.  He was referred to follow-up with sports medicine for further evaluation and treatment.  Patient placed in knee brace in clinic today for further support.  He was encouraged to rest, ice, elevate and compress.  Continue use of ibuprofen and Tylenol for relief of pain.

## 2022-02-01 PROBLEM — S83.231A COMPLEX TEAR OF MEDIAL MENISCUS OF RIGHT KNEE AS CURRENT INJURY: Status: ACTIVE | Noted: 2022-02-01

## 2022-06-14 RX ORDER — MULTIVIT WITH MINERALS/LUTEIN
TABLET ORAL DAILY
COMMUNITY
End: 2024-01-03

## 2022-06-14 RX ORDER — ACETAMINOPHEN 500 MG
500-1000 TABLET ORAL EVERY 6 HOURS PRN
COMMUNITY
End: 2022-11-10

## 2022-06-16 ENCOUNTER — OFFICE VISIT (OUTPATIENT)
Dept: INTERNAL MEDICINE | Facility: OTHER | Age: 77
End: 2022-06-16
Payer: MEDICARE

## 2022-06-16 VITALS
TEMPERATURE: 98.5 F | SYSTOLIC BLOOD PRESSURE: 128 MMHG | BODY MASS INDEX: 27.5 KG/M2 | WEIGHT: 196.4 LBS | DIASTOLIC BLOOD PRESSURE: 70 MMHG | HEART RATE: 67 BPM | HEIGHT: 71 IN | OXYGEN SATURATION: 95 %

## 2022-06-16 DIAGNOSIS — R42 DIZZINESS: ICD-10-CM

## 2022-06-16 DIAGNOSIS — F32.A ANXIETY AND DEPRESSION: ICD-10-CM

## 2022-06-16 DIAGNOSIS — F41.9 ANXIETY AND DEPRESSION: ICD-10-CM

## 2022-06-16 DIAGNOSIS — F01.50 VASCULAR DEMENTIA WITHOUT BEHAVIORAL DISTURBANCE (HCC): ICD-10-CM

## 2022-06-16 DIAGNOSIS — E11.51 DM (DIABETES MELLITUS) TYPE II, CONTROLLED, WITH PERIPHERAL VASCULAR DISORDER (HCC): ICD-10-CM

## 2022-06-16 DIAGNOSIS — I10 ESSENTIAL HYPERTENSION: ICD-10-CM

## 2022-06-16 DIAGNOSIS — I35.1 MODERATE AORTIC INSUFFICIENCY: ICD-10-CM

## 2022-06-16 DIAGNOSIS — J45.30 MILD PERSISTENT ASTHMA WITHOUT COMPLICATION: ICD-10-CM

## 2022-06-16 PROCEDURE — 99205 OFFICE O/P NEW HI 60 MIN: CPT | Performed by: INTERNAL MEDICINE

## 2022-06-16 SDOH — ECONOMIC STABILITY: HOUSING INSECURITY
IN THE LAST 12 MONTHS, WAS THERE A TIME WHEN YOU DID NOT HAVE A STEADY PLACE TO SLEEP OR SLEPT IN A SHELTER (INCLUDING NOW)?: NO

## 2022-06-16 SDOH — SOCIAL STABILITY: SOCIAL NETWORK: ARE YOU MARRIED, WIDOWED, DIVORCED, SEPARATED, NEVER MARRIED, OR LIVING WITH A PARTNER?: MARRIED

## 2022-06-16 SDOH — SOCIAL STABILITY: SOCIAL INSECURITY: WITHIN THE LAST YEAR, HAVE YOU BEEN HUMILIATED OR EMOTIONALLY ABUSED IN OTHER WAYS BY YOUR PARTNER OR EX-PARTNER?: NO

## 2022-06-16 SDOH — HEALTH STABILITY: MENTAL HEALTH: HOW OFTEN DO YOU HAVE 6 OR MORE DRINKS ON ONE OCCASION?: NEVER

## 2022-06-16 SDOH — SOCIAL STABILITY: SOCIAL NETWORK
IN A TYPICAL WEEK, HOW MANY TIMES DO YOU TALK ON THE PHONE WITH FAMILY, FRIENDS, OR NEIGHBORS?: MORE THAN THREE TIMES A WEEK

## 2022-06-16 SDOH — ECONOMIC STABILITY: INCOME INSECURITY: HOW HARD IS IT FOR YOU TO PAY FOR THE VERY BASICS LIKE FOOD, HOUSING, MEDICAL CARE, AND HEATING?: NOT VERY HARD

## 2022-06-16 SDOH — SOCIAL STABILITY: SOCIAL INSECURITY
WITHIN THE LAST YEAR, HAVE YOU BEEN KICKED, HIT, SLAPPED, OR OTHERWISE PHYSICALLY HURT BY YOUR PARTNER OR EX-PARTNER?: NO

## 2022-06-16 SDOH — SOCIAL STABILITY: SOCIAL NETWORK: HOW OFTEN DO YOU ATTEND CHURCH OR RELIGIOUS SERVICES?: NEVER

## 2022-06-16 SDOH — HEALTH STABILITY: MENTAL HEALTH
STRESS IS WHEN SOMEONE FEELS TENSE, NERVOUS, ANXIOUS, OR CAN'T SLEEP AT NIGHT BECAUSE THEIR MIND IS TROUBLED. HOW STRESSED ARE YOU?: RATHER MUCH

## 2022-06-16 SDOH — SOCIAL STABILITY: SOCIAL INSECURITY: WITHIN THE LAST YEAR, HAVE YOU BEEN AFRAID OF YOUR PARTNER OR EX-PARTNER?: NO

## 2022-06-16 SDOH — ECONOMIC STABILITY: INCOME INSECURITY: IN THE LAST 12 MONTHS, WAS THERE A TIME WHEN YOU WERE NOT ABLE TO PAY THE MORTGAGE OR RENT ON TIME?: NO

## 2022-06-16 SDOH — ECONOMIC STABILITY: FOOD INSECURITY: WITHIN THE PAST 12 MONTHS, THE FOOD YOU BOUGHT JUST DIDN'T LAST AND YOU DIDN'T HAVE MONEY TO GET MORE.: NEVER TRUE

## 2022-06-16 SDOH — SOCIAL STABILITY: SOCIAL INSECURITY
WITHIN THE LAST YEAR, HAVE TO BEEN RAPED OR FORCED TO HAVE ANY KIND OF SEXUAL ACTIVITY BY YOUR PARTNER OR EX-PARTNER?: NO

## 2022-06-16 SDOH — ECONOMIC STABILITY: FOOD INSECURITY: WITHIN THE PAST 12 MONTHS, YOU WORRIED THAT YOUR FOOD WOULD RUN OUT BEFORE YOU GOT MONEY TO BUY MORE.: NEVER TRUE

## 2022-06-16 SDOH — HEALTH STABILITY: MENTAL HEALTH: HOW MANY STANDARD DRINKS CONTAINING ALCOHOL DO YOU HAVE ON A TYPICAL DAY?: PATIENT DOES NOT DRINK

## 2022-06-16 SDOH — HEALTH STABILITY: MENTAL HEALTH: HOW OFTEN DO YOU HAVE A DRINK CONTAINING ALCOHOL?: NEVER

## 2022-06-16 SDOH — HEALTH STABILITY: PHYSICAL HEALTH: ON AVERAGE, HOW MANY MINUTES DO YOU ENGAGE IN EXERCISE AT THIS LEVEL?: 20 MIN

## 2022-06-16 SDOH — ECONOMIC STABILITY: TRANSPORTATION INSECURITY
IN THE PAST 12 MONTHS, HAS THE LACK OF TRANSPORTATION KEPT YOU FROM MEDICAL APPOINTMENTS OR FROM GETTING MEDICATIONS?: NO

## 2022-06-16 SDOH — SOCIAL STABILITY: SOCIAL NETWORK
DO YOU BELONG TO ANY CLUBS OR ORGANIZATIONS SUCH AS CHURCH GROUPS UNIONS, FRATERNAL OR ATHLETIC GROUPS, OR SCHOOL GROUPS?: YES

## 2022-06-16 SDOH — ECONOMIC STABILITY: TRANSPORTATION INSECURITY
IN THE PAST 12 MONTHS, HAS LACK OF TRANSPORTATION KEPT YOU FROM MEETINGS, WORK, OR FROM GETTING THINGS NEEDED FOR DAILY LIVING?: NO

## 2022-06-16 SDOH — HEALTH STABILITY: PHYSICAL HEALTH: ON AVERAGE, HOW MANY DAYS PER WEEK DO YOU ENGAGE IN MODERATE TO STRENUOUS EXERCISE (LIKE A BRISK WALK)?: 3 DAYS

## 2022-06-16 SDOH — SOCIAL STABILITY: SOCIAL NETWORK: HOW OFTEN DO YOU ATTENT MEETINGS OF THE CLUB OR ORGANIZATION YOU BELONG TO?: 1 TO 4 TIMES PER YEAR

## 2022-06-16 SDOH — SOCIAL STABILITY: SOCIAL NETWORK: HOW OFTEN DO YOU GET TOGETHER WITH FRIENDS OR RELATIVES?: MORE THAN THREE TIMES A WEEK

## 2022-06-16 ASSESSMENT — PATIENT HEALTH QUESTIONNAIRE - PHQ9
5. POOR APPETITE OR OVEREATING: 0 - NOT AT ALL
CLINICAL INTERPRETATION OF PHQ2 SCORE: 3
SUM OF ALL RESPONSES TO PHQ QUESTIONS 1-9: 13

## 2022-06-16 ASSESSMENT — LIFESTYLE VARIABLES
SKIP TO QUESTIONS 9-10: 1
AUDIT-C TOTAL SCORE: 0

## 2022-06-16 ASSESSMENT — FIBROSIS 4 INDEX: FIB4 SCORE: 2.09

## 2022-06-16 NOTE — PATIENT INSTRUCTIONS
We thank you for taking the time to share during your medical visit with our team of providers at the [facility]. During the medical visit we completed a Comprehensive Geriatric Assessment with a , pharmacist, and physician, all specialty trained in Geriatrics.     Below are our Age Friendly recommendations. Our recommendations are shaped using the 4 M’s model of care. In using the 4 M’s we approach care from starting with What Matters most to you.  We then use Mobility, Medications and Mentation to support that. Please note, our recommendations are another point on your health care journey. We hope the time we spent together helps you achieve What Matters most to you.    What Matters     During the visit you shared that you were hoping to address your recent health changes and memory concerns. You also shared that being with your wife, children and grandchildren gives you meaning in life and that you are looking forward to finding some solutions to your memory challenges and support for my wife as she helps me.    We appreciate your openness and honesty with sharing this personal information. In helping you achieve What Matters we know that happiness, safety, support, companionship, symptom control, adequate sleep, and advanced planning can help many of us achieve What Matters. Below are some resources that we discussed in clinic that we think will help you achieve What Matters, especially some information about advance care planning.   Mentation    During your assessment we felt that your mood may be a playing a role in your health. To better address your mood, we recommend a variety of medical, pharmacological, and behavioral health approaches to treatment.     Medical: Sometimes depression or anxiety can be secondary to medical conditions. Your memory loss can issues with mood. I think is part of the reason your mood is down.     Pharmacological: Sometimes medications can be a  helpful tool in dealing with our mood. You are currently on fluoxetine for depression. I would recommend other medications like sertraline, escitalopram or citalopram for depression. These medications have less side effects, compared to fluoxetine which can stay in your system a long time,     Behavioral Health: When we think about our behavior, we think of both of our cognitive and physical behaviors. Sometimes working through some of our thoughts and/or emotions can be helpful. We recommend seeing a counselor/therapist to discuss some of these issues. Physical activity can help us improve our mood. We recommend you be active to help you achieve What Matters most to you.     As you work through your treatment plan, please make sure to keep active and doing those things that give you meaning in life. Thinking, memory, mood, and mental health matter! Just like your body changes with age, so can your brain. Ways to support mentation include being engaged in meaningful activities and managing stress and anxiety. Trying new ways to relax and connect may be helpful.    During your assessment, we noted that your cognition may be/is affecting your ability to function in daily life. Support from family and friends can be very important as you continue this part of your health care journey. Below are the specific recommendations we discussed during your visit. Please continue to follow up with your provider about any concerns or sudden changes that you or your family note.       Medications  During our assessment, we reviewed your medications to make sure they are supporting What Matters most you. Based on our discussion we thought there may be opportunity to adjust some medications to help you better achieve your health goals. When adjusting medications, we generally like to only make one change at a time. Below are some recommendations for you to discuss with your primary provider.   -we talked about changes to your  depression, diabetes and blood pressure medication.     Mobility   During our assessment we were happy to see how active you are! We encourage you to keep this up. At the [facility] we focus on how mobility can help you achieve What Matter’s. In general, we recommend all adults be active every day, to the best of their ability. We recommend about 150 minutes of moderate exercise per week, about 25 minutes per day. Choose any activity that you enjoy doing. You should slowly work up to this goal.       Other Recommendations  Medical Recommendations:  We talked that I believe you have a vascular dementia. This is related  to the changes you experienced during your TIAs and episodes of elevated blood pressure.   For your diabetes, and for someone your age your goal for sugar control is an A1c of about 7.5%. Since your A1c is around 6% I am not sure you need the metformin anymore, you can follow up with Denver J Miller, M.D. about this  It is also worth noting blood pressure medications can cause some of your symptoms of light headedness. I am not sure if decreasing your medications will improve your symptoms, but is something to talk about with Denver J Miller, M.D. I also recommend following up with Dr. Goodwin of cardiology to review an ECHO you had in October 2021. It noted you have some changes in your aortic valve and she may have some insight into possible treatments for this, but there may be nothing to intervene on as well, it is worth getting her opinion.   For your breathing you should take the Combivent 3-4 times a day and Breo once or twice a day, scheduled. The albuterol should be a rescue inhaler.     Social Work Care Plan:   For caregiver support, contact the Alzheimer[employer] at 1-502.532.7585 or via their website, www.alz.org. They provide support groups, education and 24 hour support for all types of cognitive impairments. They also have a respite marquita that you will qualify for. It can help  you pay for various services, $1000/month.  Refer to provided [facility] resources.  Consider seeking in-home support options for respite and when you need caregiving breaks. The [employer] has a wealth of knowledge and information about in-home care giving services and agencies. Please look at their web site or make an appointment to speak with them.  https://www.alz.org/help-support/caregiving/care-options/in-home-care. [facility] handout given.  Consider a home safety evaluation. An option is [facility] - [address] [place] [address] ([phone]). The [employer] at [facility] can evaluate your home and also help install grab bars, etc. Tthere is a cost that is not covered by insurance.    Consider attending an Adult Day Program. These services provide socialization, exercise and live entertainment. Some options are: [facility] - [address] [place] [address] ([phone]); [facility] - [address] [place] [address] ([phone]); and [facility] - [address] [place] [address] ([phone]).       List of Handouts:  Advance directives , MIND diet, exercise handout, supplements, tip for preventing falls,     Referral Recommendations (to be ordered by your primary care provider)  Home Safety Evaluation         To follow up with our recommendation (orders, referrals, med changes, etc) we encourage you to follow with their primary care provider before implementing these changes.

## 2022-06-16 NOTE — PROGRESS NOTES
Interdisciplinary Comprehensive Geriatric Assessment (CGA) - [facility]  Visit Note:  Chief Complaint   Patient presents with   • Health Risk Assessments For Seniors     [facility]iatric Assessment      Patient Name: Mello Isabel  Patient Age; 77 y.o.  Date of Consult: 6/16/2022  Referring Provider: Self-referred, Patient for memory  PCP: Denver J Miller, M.D.    Interdisciplinary Geriatric Consult Provided By:   Michael Holm M.D.  Karen Lin, [facility]    Assessment and Plan: 76 y/o male coming in for comprehensive geriatric assessment with concern for memory. History suggests he has a vascular dementia, however mood is also likely playing a role too.     DM (diabetes mellitus) type II controlled peripheral vascular disorder  Given age and A1c, agree with PCP minimizing use of metformin. Goal A1c is about 7.5%  Discussed that patient could consider stopping metformin and regular CBG checks and just monitor with A1c.     Vascular dementia without behavioral disturbance (HCC)  The patient's cognition does appear to be effecting their IADL status. This does suggest the presence of a neurocognitive disorder. Delirium, mood, and acute psychiatric conditions were considered in the differential. Patient does have subjective complaints and a physical exam finding that does warrant continued work up.  Given history of stepwise declines after TIAs and hospital stays vascular type of dementia likely predominates, though he could have mixed as well given significant memory loss. Patient has preserved insight which significantly affects mood and this should continue to be addressed, see depression    Recommendations  -Recommend labs that were not available for review: CBC, CMP,  B12, TSH, ft4, RPR and HIV to rule out reversible causes of cognitive changes.  -We discussed diet and exercise as important mitigating measures regarding progressive cognitive impairment.  -continue secondary  "prevention for strokes.    -consider minimizing OTC polypharmacy, provided supplement handout.   -will request neuropsychology evaluation and neurology notes.       Dizziness  Worsened with increase in fluoxteine  Also possible that patient has some orthostatic in nature given us of BP meds  Reviewed [facility] cardiogram from 10/2021; demonstrated moderate AI, encouraged patient to f/u with cardiology regarding this, though not clearly severe enough to be cause of dizziness/syncope    See HTN and Depresison    Essential hypertension  Provided BP log and instructions so patient can f/u with PCP. If dizziness does not improve with adjustment of depression medications would consider adjusting these medications. Consider decrease CCB first given mild ankle swelling and that this can be a side effect.     Asthma, mild persistent  Patient also with history of smoking  Gets SOB with ambulation   Discussed use of inhalers that are used on a regular basis and those that are used as needed.  Recommend PCP to consider transition from COMBIVENT to Spiriva to decrease the amount of inhalations needed per day.     Anxiety and depression  Patient recently started on fluoxetine with improvement in symptoms. When increase antwon 10 to 20 mg dizziness worsened, now back on 10 mg.   Discussed with patient that fluoxetine has a long half life.   Preferred antidepressants/anti anxiety meds in elders include escitalopram, citalopram, or sertraline. Recommend PCP consider use of these in lieu of fluoxetine as they are less likely to have side effects; did discuss they may also cause dizziness.   Recommend counseling      Moderate aortic insufficiency  Seen on echocardiogram 10/20/2021.  Recommend follow-up with cardiology,\" not clearly causing significant hemodynamically significant changes but would want to get their opinion given issues with syncope.    Patient Instructions     We thank you for taking the time to share during your medical visit " with our team of providers at the [facility]. During the medical visit we completed a Comprehensive Geriatric Assessment with a , pharmacist, and physician, all specialty trained in Geriatrics.     Below are our Age Friendly recommendations. Our recommendations are shaped using the 4 M’s model of care. In using the 4 M’s we approach care from starting with What Matters most to you.  We then use Mobility, Medications and Mentation to support that. Please note, our recommendations are another point on your health care journey. We hope the time we spent together helps you achieve What Matters most to you.    What Matters     During the visit you shared that you were hoping to address your recent health changes and memory concerns. You also shared that being with your wife, children and grandchildren gives you meaning in life and that you are looking forward to finding some solutions to your memory challenges and support for my wife as she helps me.    We appreciate your openness and honesty with sharing this personal information. In helping you achieve What Matters we know that happiness, safety, support, companionship, symptom control, adequate sleep, and advanced planning can help many of us achieve What Matters. Below are some resources that we discussed in clinic that we think will help you achieve What Matters, especially some information about advance care planning.   Mentation    During your assessment we felt that your mood may be a playing a role in your health. To better address your mood, we recommend a variety of medical, pharmacological, and behavioral health approaches to treatment.     Medical: Sometimes depression or anxiety can be secondary to medical conditions. Your memory loss can issues with mood. I think is part of the reason your mood is down.     Pharmacological: Sometimes medications can be a helpful tool in dealing with our mood. You are currently on fluoxetine  for depression. I would recommend other medications like sertraline, escitalopram or citalopram for depression. These medications have less side effects, compared to fluoxetine which can stay in your system a long time,     Behavioral Health: When we think about our behavior, we think of both of our cognitive and physical behaviors. Sometimes working through some of our thoughts and/or emotions can be helpful. We recommend seeing a counselor/therapist to discuss some of these issues. Physical activity can help us improve our mood. We recommend you be active to help you achieve What Matters most to you.     As you work through your treatment plan, please make sure to keep active and doing those things that give you meaning in life. Thinking, memory, mood, and mental health matter! Just like your body changes with age, so can your brain. Ways to support mentation include being engaged in meaningful activities and managing stress and anxiety. Trying new ways to relax and connect may be helpful.    During your assessment, we noted that your cognition may be/is affecting your ability to function in daily life. Support from family and friends can be very important as you continue this part of your health care journey. Below are the specific recommendations we discussed during your visit. Please continue to follow up with your provider about any concerns or sudden changes that you or your family note.       Medications  During our assessment, we reviewed your medications to make sure they are supporting What Matters most you. Based on our discussion we thought there may be opportunity to adjust some medications to help you better achieve your health goals. When adjusting medications, we generally like to only make one change at a time. Below are some recommendations for you to discuss with your primary provider.   -we talked about changes to your depression, diabetes and blood pressure medication.     Mobility   During our  assessment we were happy to see how active you are! We encourage you to keep this up. At the [facility] we focus on how mobility can help you achieve What Matter’s. In general, we recommend all adults be active every day, to the best of their ability. We recommend about 150 minutes of moderate exercise per week, about 25 minutes per day. Choose any activity that you enjoy doing. You should slowly work up to this goal.       Other Recommendations  Medical Recommendations:  • We talked that I believe you have a vascular dementia. This is related  to the changes you experienced during your TIAs and episodes of elevated blood pressure.   • For your diabetes, and for someone your age your goal for sugar control is an A1c of about 7.5%. Since your A1c is around 6% I am not sure you need the metformin anymore, you can follow up with Denver J Miller, M.D. about this  • It is also worth noting blood pressure medications can cause some of your symptoms of light headedness. I am not sure if decreasing your medications will improve your symptoms, but is something to talk about with Denver J Miller, M.D. I also recommend following up with Dr. Goodwin of cardiology to review an ECHO you had in October 2021. It noted you have some changes in your aortic valve and she may have some insight into possible treatments for this, but there may be nothing to intervene on as well, it is worth getting her opinion.   • For your breathing you should take the Combivent 3-4 times a day and Breo once or twice a day, scheduled. The albuterol should be a rescue inhaler.     Social Work Care Plan:  •  For caregiver support, contact the Alzheimer’[employer] Association at 1-484.818.2601 or via their website, www.alz.org. They provide support groups, education and 24 hour support for all types of cognitive impairments. They also have a respite marquita that you will qualify for. It can help you pay for various services, $1000/month.  Refer to provided  Dementia [employer] resources.  • Consider seeking in-home support options for respite and when you need caregiving breaks. The Alzheimer's Association has a wealth of knowledge and information about in-home care giving services and agencies. Please look at their web site or make an appointment to speak with them.  https://www.alz.org/help-support/caregiving/care-options/in-home-care. [facility] handout given.  • Consider a home safety evaluation. An option is [facility] - [address] [place] [address] ([phone]). The [employer] at [facility] can evaluate your home and also help install grab bars, etc. Tthere is a cost that is not covered by insurance.    • Consider attending an Adult Day Program. These services provide socialization, exercise and live entertainment. Some options are: [facility] - [address] [place] [address] ([phone]); [facility] - [address] [place] [address] ([phone]); and [facility] - [address] [place] [address] ([phone]).       List of Handouts:  • Advance directives , MIND diet, exercise handout, supplements, tip for preventing falls,     Referral Recommendations (to be ordered by your primary care provider)  • Home Safety Evaluation     To follow up with our recommendation (orders, referrals, med changes, etc) we encourage you to follow with their primary care provider before implementing these changes.     NOTABLE PATIENT INFORMATION:   Patient's Primary Language: English   Patient's Care Partner(s) Name: Zoë   Care Partners(s) Relationship to Patient: Spouse   History provided by:Patient and Spouse  Patient is a Limited historian  Patient reports the following sensory limitations:  Hearing: good with hearing aids, present today  Vision:good, wears eye glasses.     77 y.o. male with  has a past medical history of Allergy to pollen, Asthma, mild  persistent, DM (diabetes mellitus) type II controlled peripheral vascular disorder, Essential hypertension, Hyperlipidemia LDL goal <70, and TIA (transient ischemic attack) (2012).    Patient Concerns: (as stated by Patient):  Primary presenting issue includes concerns with memory    Family/Care Partner Concerns: (as stated by Spouse/Partner):  Primary presenting issue includes . Memory loss     Current living environment: lives in a single story home.     Describe a typical day: gets up b/w 6-7, goes to sleep in chair for a few hours, then eats breakfast, then goes to lots of appointments    Does patient report feeling “unsafe” in his/her home, or afraid of anyone? No    What Matters Most? Wife, enjoys carpentry and construction.     What are you looking forward to? Helping his son build his house    Advance Directives:yes    Review of Diagnoses:     Follows with Dr. Swetha Goodwin of Cardiology as patient was stressed when his wife is sick. Heart work up reported to be negative    Saw SHAQ Boss of neurology, got a neuropsychology evaluation from another provider. 2020    TIAs - see cognitive ROS below     Hypertension - on lisinopril 40 mg per day and amlodpine 10 mg daily. Measures BP at home, can get into the low 120 or 110s, ankles have some swelling    DM - checks BP at home; on metformin 500 mg every other day; last A1c was 6%    Depression - on fluoxeteine 10 mg, falls worsened after increasing to 20 mg, but improved his anger    HLD - on 80 mg atorvastatin      Syncope - did not get great history,     Gets cramps in legs at night, was rx pramipexaole, did not work. Improved with decreasing coffee intake and not currently taking.     Asthma - smoked 2 PPD for 40 years; on Breo, combivent and albuterol; gets SOB walking from bedroom to living room; not taking consistently as they did not know excactly what inhalers are for.     OTC meds  Viamin C  VIt B12  Ca VidD  A3 max potency  Vitamind D3  Number  of Chronic Illnesses >5:yes  Review of IADL and ADLs: (Retrieved  from [facility] note and updated based on information provided during assessment)  Wife has always paid the bills.   IADL  Legend:   1- independent  2 - need assistance  3 - unable to complete         Are you still driving? No      Are you able to prepare your own meals and/or cook, need assistance or unable to complete? 1     Are you able to do shopping and errands, need assistance or unable to complete? 2     Are you able to do housekeeping, chores, need assistance or unable to complete? 1     Are you able to do laundry, need assistance or unable to complete? 3     Are you able to manage your medications, need assistance or unable to complete? 2     Are you able to do your own money management, need assistance or unable to complete? 3     Are you able to use the phone, need assistance or unable to use the phone? 1     ADL     Are you independent, need assistance, or unable to bathe yourself? 1     Are you independent, need assistance, or unable to dress yourself? 1     Are you independent, need assistance, or unable to feed yourself? 1     Are you independent, need assistance, or unable to get up out of a chair or off a bed? 1     Are you independent, need assistance, or unable to use the toilet by yourself?1     Are you aware when you need to use the toilet? Yes     Screening Tests and Review of System: (Retrieved from MAs note and updated based on information provided during assessment)    FRAIL Scale Score: 1/5- >5 illness   Mini Co/5; 0/3 memory recall   PHQ 9 score: 13  DARION 7: 13    ROS   Appetite: good   Diet: Turkey and cheese sandwhich, carrots, cereal, likes pizza, chinese food, microwavable dinner, eats fruits and vegetables.   Fatigue: most of the time  Weight Loss: none  Limits in Activity:yes; syncope   Able to climb 10 Stairs: yes  Able  to walk two blocks: yes  Exercise:  has a stationary bike that he sometimes uses,   Dental Problems:  Did not assess, due to time  Dysphagia: Did not assess, due to time  Constipation:Did not assess, due to time  Diarrhea:Did not assess, due to time  Fecal Incontinence: Did not assess, due to time  Urinary Incontinence: Did not assess, due to time  Fall Screening:   Any falls within the last year? At least 20; multiple causes, environmental, possible syncope.   Any injuries associated with the falls? Yes, meniscus tear   -If yes, what injury?   Do you worry about falling? Sometimes   Do you feel unsteady when standing or walking? Sometimes   You have symptoms of lightheadedness or dizziness from lying to standing? If stands too fast   Sleep: improved after decreasing coffee intake .  Mood: has depression and anxiety related to memory problems and feels he does not suport his family as much as he wants to   Does patient acknowledge current or past symptoms of dangerousness to self? No  Does parent/significant other report patient has current or past symptoms of dangerousness to self? No      Any PERSONAL history of depression/anxiety/psychiatric disorder: yes  Any FAMILY history of depression/anxiety/psychiatric disorder: no    Cognitive ROS  Memory concerns:yes  Time course: 2011 patient had first TIA in [place]. Presented with confusion, then started having memory problems after, then things were overall stable and started acting confused again 2013 while in [place] this made the memory loss got worse. He was also having some ER visits for elevated blood pressure about 4 times. After episodes of things BP they also notice some changes. Also feels memory has gotten worse. Gets lots in familiar places when driving; stopped driving ocmpletly a few months, but has had concerns for driving a few years.    History of TBI: no   Hallucinations:no  Tremor:yes; right hand resting tremor and also with using it  Rigidity:no  Gait Changes:yes  Behavior/personality changes: yes temper has improved.     Alcohol use: none  currently; previously drank more significant amounts; would drink up to a 6 pack; also started drinking wine. Stopped drinking 1980s  Smokin pack year history.   Recreational Drug Use:   Any addictive behavior reported (gambling, shopping, sex)? Yes   Neuropathy present: no  History of PVD/MI/Stroke: yes  FH of dementia yes; maternal aunt had Alz  Previous labs yes  Previous Head Imaging: yes    Current Outpatient Medications on File Prior to Visit   Medication Sig Dispense Refill   • ALBUTEROL SULFATE HFA INH Inhale 2 Inhalation 3 times a day as needed.     • acetaminophen (TYLENOL) 500 MG Tab Take 500-1,000 mg by mouth every 6 hours as needed.     • Fluticasone Furoate-Vilanterol (BREO) 200-25 MCG/INH AEROSOL POWDER, BREATH ACTIVATED Inhale 1 Puff every day.     • Ascorbic Acid (VITAMIN C) 1000 MG Tab Take  by mouth every day.     • Cholecalciferol (VITAMIN D3) 125 MCG (5000 UT) Cap Take 1 Capsule by mouth every day.     • amLODIPine (NORVASC) 5 MG Tab Take 2 Tablets by mouth every day. 30 Tablet 0   • ipratropium-albuterol (COMBIVENT RESPIMAT)  MCG/ACT Aero Soln Inhale 1 Puff 1 time a day as needed (Shortness of breath).     • metFORMIN (GLUCOPHAGE) 500 MG Tab Take 500 mg by mouth every day.     • FLUoxetine (PROZAC) 10 MG Cap Take 10 mg by mouth every day.     • aspirin EC (ECOTRIN) 81 MG Tablet Delayed Response Take 81 mg by mouth at bedtime.     • Calcium Carbonate (CALCIUM 500 PO) Take 500 mg by mouth every Monday, Wednesday, and Friday.     • Multiple Vitamin (MULTIVITAMIN PO) Take 1 Tablet by mouth every day.     • lisinopril (PRINIVIL) 40 MG tablet Take 40 mg by mouth every day.     • atorvastatin (LIPITOR) 80 MG tablet TAKE 1 TABLET BY MOUTH EVERY DAY (Patient taking differently: Take 80 mg by mouth every day.) 90 Tab 3   • Blood Glucose Monitoring Suppl SUPPLIES Misc 1 Device by Does not apply route 2 times a day as needed. Test strips order: Test strips for free style lite meter 180 Device 3  "    No current facility-administered medications on file prior to visit.         Physical Exam:   /70 (BP Location: Left arm, Patient Position: Sitting, BP Cuff Size: Adult)   Pulse 67   Temp 36.9 °C (98.5 °F)   Ht 1.81 m (5' 11.25\")   Wt 89.1 kg (196 lb 6.4 oz)   SpO2 95%   BMI 27.20 kg/m²   Physical Exam  Vitals reviewed.   Cardiovascular:      Rate and Rhythm: Bradycardia present.      Pulses:           Radial pulses are 2+ on the right side and 2+ on the left side.   Pulmonary:      Effort: Pulmonary effort is normal.      Breath sounds: Normal breath sounds and air entry.   Neurological:      Cranial Nerves: Cranial nerves are intact.      Sensory: Sensation is intact.      Motor: Motor function is intact.      Coordination: Coordination is intact.         I reviewed the patient's ADL, IADL, MiniCog, PHQ9, GAD7, falls, and frailty screens as documented in the Medical Assistant's note, but may have made changes due to information obtrained during the visit.     Social History     Socioeconomic History   • Marital status:      Spouse name: Not on file   • Number of children: Not on file   • Years of education: Not on file   • Highest education level: Not on file   Occupational History   • Not on file   Tobacco Use   • Smoking status: Former Smoker     Types: Cigarettes     Quit date:      Years since quittin.4   • Smokeless tobacco: Former User     Types: Chew     Quit date:    Vaping Use   • Vaping Use: Never used   Substance and Sexual Activity   • Alcohol use: No     Alcohol/week: 0.0 oz   • Drug use: No   • Sexual activity: Not on file   Other Topics Concern   • Not on file   Social History Narrative   • Not on file     Social Determinants of Health     Financial Resource Strain: Not on file   Food Insecurity: Not on file   Transportation Needs: Not on file   Physical Activity: Not on file   Stress: Not on file   Social Connections: Not on file   Intimate Partner Violence: Not " on file   Housing Stability: Not on file       Family History   Problem Relation Age of Onset   • Cancer Mother         liver   • Stroke Father    • Hypertension Brother    • Hyperlipidemia Brother    • Diabetes Brother    • Hypertension Brother    • Hyperlipidemia Brother        Labs:  CBC: CBC available for review significant for hemoglobin 12.7 CMP available for review overall unremarkable both these labs are completed June 2022.  Patient had a normal B12 level on 6/1/2022, cholesterol panel on 4/1/2022 cholesterol of 145, triglycerides of 107 HDL of 51, and LDL of 72.  In April 2021 the patient had a normal TSH and free T4.  Records will be scanned in  Imaging: MRI brain 1/12/2021 reviewed overall unremarkable.  They did note that there was a partially empty sella turcica but the pituitary stalk is midline.  They noted the lateral ventricles were enlarged with asymmetry with the right being larger to than the left.    The patient had a CTA of the head and neck on June 11, 2022.  Overall is remarkable for right ICA stenosis to 50 to 60% and left ICA stenosis to 20 to 30%.    Patient had a CTA of the head in June 11, 2022 overall unremarkable they did note 50 to 60% stenosis of the right ICA and 20 to 30% stenosis of the left ICA.    My total time spent caring for the patient on the day of the encounter was 120 minutes.   This does not include time spent on separately billable procedures/tests.    This note was partially dictated with voice recognition software, for any confusion please do not hesitate to contact me.     Brief Overview of assessment:    Our comprehensive geriatric assessment (CGA) team is comprised of a medical assistant (MA), medical provider, and , all of whom create a note during the assessment. The patient's assessment starts with the MA who screens the patient for many common geriatric syndromes. If possible, the MA does these assessments with the patient alone. The MA then  introduces the patient and (s) to the rest of the CGA team and shares information collected during the screening assessments. The CGA team then completes the assessment and provides recommendations over the rest of the visit We provide recommendations modeling that of an Age-Friendly Health System with the 4 Ms of Care (What Matters, Mentation, Medications, and Mobility). For any questions about our assessment or recommendations, please reach out to our office ([phone]). To learn more about providing age friendly care to your patients consider visiting this web site to learn more. http://www.i[facility].org/Engage/Initiatives/Age-Friendly-Health-Systems/Pages/default.    In general, we encourage patients to follow-up with their primary care provider prior to implementing the recommendations (orders, referrals, med changes, etc) discussed during the visit today, though there are times when recommendations are implemented after the visit. See A/P and patient instructions for full details.

## 2022-06-16 NOTE — NON-PROVIDER
MA CGA Assessment    NAME: Mello Isabel     Patient's Primary Language: English   Patient's Care Partner(s) Name: Zoë   Care Partners(s) Relationship to Patient: Spouse       IADL  Legend:   1- independent  2 - need assistance  3 - unable to complete       Are you still driving? No     Are you able to prepare your own meals and/or cook, need assistance or unable to complete? 1    Are you able to do shopping and errands, need assistance or unable to complete? 2    Are you able to do housekeeping, chores, need assistance or unable to complete? 1    Are you able to do laundry, need assistance or unable to complete? 3    Are you able to manage your medications, need assistance or unable to complete? 2    Are you able to do your own money management, need assistance or unable to complete? 3    Are you able to use the phone, need assistance or unable to use the phone? 1    ADL    Are you independent, need assistance, or unable to bathe yourself? 1    Are you independent, need assistance, or unable to dress yourself? 1    Are you independent, need assistance, or unable to feed yourself? 1    Are you independent, need assistance, or unable to get up out of a chair or off a bed? 1    Are you independent, need assistance, or unable to use the toilet by yourself?1    Are you aware when you need to use the toilet? Yes     If no, please describe the problem you are experiencing.       Assist Devices: Patient uses:  Cane: Yes, mostly inside the home   Walker: No   Wheelchair:No   Ostomy: No   Other tubes: No   Amputations: No   Chronic oxygen use: No   CPAP/BIPAP use:No    : Denies any urinary leakage during the last 6 months  If you have a problem with continence, do you wear special garments?        Fall Screening:   Any falls within the last year? At least 20   Any injuries associated with the falls? Yes, meniscus tear   -If yes, what injury?   Do you worry about falling? Sometimes   Do you feel unsteady when standing or  walking? Sometimes   You have symptoms of lightheadedness or dizziness from lying to standing? If stands too fast     Any throw rugs on floor? Yes   Do you have stairs? No   Do you have handrails on all stairs?   Good lighting in all hallways.  Yes   Any difficulty hearing? Yes   Wears hearing aids: Yes   Any difficulty with vision? No   Last eye exam: >1 year ago       FRAIL SCALE    Fatigue:  How much time during the past 4 weeks did you feel tired:  1=All the time, 2=Most the time, 3=Some of the time, 4=A little of the time,  5=None of the time  Answer: 2  (responses of 1 or 2 are scored as 1 and all others as 0)  SCORE: 1    Resistance:  By yourself and not using aids, do you have any difficulty walking up 10 steps without resting?  1=Yes, 0=No  SCORE: 0    Ambulation:  By yourself and not using aids, do you have any difficulty walking a couple of blocks?  1=Yes, 0=No  SCORE:0    Illnesses: Did a doctor ever tell you that you have: (illness)?  How many (see list)    Hypertension: Yes   Diabetes: Yes   Cancer (other than minor skin cancer): No   Chronic Lung Disease: No   Heart attack: No   Congestive Heart Failure: No   Angina: Yes   Asthma: Yes   Arthritis: No   Stroke: TIA's   Kidney Disease: No     The total illnesses (0-11) are recorded as 0-4 = 0 and 5-11 = 1  Total Illness Score: 5    SCORE: 1    Loss of Weight over last year:   If noted above, one year ago, how much did you weigh:?  Up 5-10 lbs   (percent change is computed as: weight 1 year ago- current weight/weight 1 year ago. X 100.  (more than 5% weight loss is scored as 1, and less than 5% is 0)    Score: 0    Total Score: 2    Scorin =  Robust Health  1-2=Pre-frail  3-5=Frail    Ask if they have been admitted to the hospital in the past three month:  No       MiniCog:   Words asked: Telephone Carrot Mouse   Time 3:02     2/2; 0/3 for memory recall  Total score:2 /5           Depression Screen - PHQ- 9   0 = Not at all, 1 = Several days,  2 =  More than half the days,  3 = Nearly every day     Little interest or pleasure in doing things? 1  Feeling down, depressed , or hopeless? 2  Trouble falling or staying asleep, or sleeping too much? 2   Feeling tired or having little energy? 1  Poor appetite or overeating?  0  Feeling bad about yourself - or that you are a failure or have let yourself or your family down?  3  Trouble concentrating on things, such as reading the newspaper or watching television? 2  Moving or speaking so slowly that other people could have noticed.  Or the opposite - being so fidgety or restless that you have been moving around a lot more than usual?  0  Thoughts that you would be better off dead, or of hurting yourself? 2  Patient Health Questionnaire Score:  13    Anxiety Screen/DARION-7 Questionnaire  0 = Not at all, 1 = Several days,  2 = More than half the days,  3 = Nearly every day     Feeling nervous, anxious, or on edge: 1  Not being able to stop or control worrying: 3     Worrying too much about different things: 0  Trouble relaxin  Being so restless that it's hard to sit still: 2  Becoming easily annoyed or irritable:  3  Feeling afraid as if something awful might happen: 3  Total Score :13     Interpretation of DARION 7 Total Score   Score Severity :  0-4 No Anxiety   5-9 Mild Anxiety  10-14 Moderate Anxiety  15-21 Severe Anxiety      I

## 2022-06-16 NOTE — PROGRESS NOTES
"Geriatric Psychosocial Assessment and Care Plan     Participants in Assessment  Patient and wife Zoë.    Patient Concerns  Tell me what brings you in today?  “I’ve been having problems walking. I forget a lot. I try hard not to forget but I still do. I have gotten lost at night and forgot where was I was going. One time I took the wrong turn, especially when it’s dark. I’m wondering if the TIA’s have anything to do with this.”    Family Concerns  With your permission, I’d like to ask your wife what their concerns are today.  “His memory loss definatley, along with everything else that goes along with it. Toolsl and support to do this.\"     Strengths/What Matters  What gives meaning and reba to your life?  “My wife. Meeting her was the luckiest thing I ever did. 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 I love [employer]. I have a ’52 Chevy Pickup that I was restoring, blue and beige. I gave my wife red ’69 Corvette.\"    Tell me what you are good at? 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 I like to be helpful.\"    What your limitations? \"I can’t drive. I can’t get on ladders or roofs anymore. I can’t pay the bill for dinner anymore after I almost gave a $600 tip. Trouble with numbers.\"    Family & Support System  How long have you been ?   \"I was  before for 10 years. My second marriage to Zoë,  since 1974.\"     Children (First name; state where living)  1 biological daughter- Alexsandra lives in [place].  4 step-children: Rosy in [place], [place], Bernadine in [place], [place], Shira in [place], [place] and Maximo lives in [place], [place]. Domenic- oldest child, estranged.    Total number of grandchildren = 5 grandchildren. 6 great-grandchildren.    Tell me who is part of your inner/social [place]?   \"Our family, our daughters, mostly Bernadine as she lives in town. All of my daughters are very supportive even though some are my " "step-daughters.\"    Tell me about other people you connect with? (friends/neighbors/mentors)  \" Neighbors across the street, wife has yoga friends, [employer] and [employer] friends.\"    Of these people, who would you talk to about a private matter? Who would you ask for help or assistance?  \"Bernadine, then Shira or Rosy.\"    Current Living Environment  Description of home and household members:  Single story home with wife Zoë.  How long have you lived there? 7 years.   Do you have a modified bathroom? (Grab bars, shower chair, raised toilet seat?) Walk-in shower, no other modifications.   Do you have any adaptive equipment? (Walker, cane, grabber?)  Cane, uses occasionally.    Typical day/Appetite/Exercise and Social Activity (Sleep routine; activities)  Tell me about your typical day from when you wake up until you go to bed?  “I wake up and depending on if I feel well or not, I get up and have breakfast. Then…..I can't remember.”    Per wife, “Wakes between 6-7am, goes back to sleep, then have breakfast late about 11am. He sleeps a lot and would sleep more if I let him. Lately we have had a lot of appointments. This morning he seemed to be very out of it. Yesterday was a good today, but this morning something was wrong. He woke with a headache. Drank some coffee and had Tylenol. He eats 3 meals x day. He recently stopped driving after this last hospitalization. It varied what time he goes to sleep.\"    Do you have trouble falling asleep/staying asleep? Do you wake to use the restroom at night?  How many meals do you eat in a day? 3 x day.   How do you stay active or exercise? Is exercise a priority in your day? \"I ride a stationary bike sometimes.\"    Orthodoxy-Spiritual/Cultural Systems  Are you a part of a Adventist/spiritual community? No.     What do you believe in? “I believe in God and I pray.”    Education/Work/  What was the highest grade you completed? Retired in 2015.   What is the last " "job you worked before [employer]?  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  Did you serve in the ? VA benefits? No.     Advance Directives  Are you familiar with Advance Care planning documents? Yes, we redid this document in about 2017.  Will you share who that person is?  o Agents for [employer]: Wife Zoë and then daughter Bernadine as secondary.  o Agents for [employer] Finances: Wife Zoë and wandy Colindres as secondary.     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 Yes.    Do you receive any additional assistance? (Food bank, SNAP, utility assistance, etc.) No.    Behavioral Health  Alcohol consumption: Do you drink alcohol? How often?   Not currently. 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 Quit drinking in the early 1980's.     Do you currently use medicinal marijuana, CBD or street drugs? No.    Behavioral Health/Safety  Are you fearful at home? (abuse/neglect/hallucinations/paranoia)  “No, but I am always listening for the door alarm.”  Per wife, “What he feels is not the reality. He thinks people do not care about him, people are out to get him. If the grandkids don't run up to him, he is worried they don't like him. He stands back, sits in the corner and think everyone hates him.”    Do you feel safe in your neighborhood? Would you walk around the neighborhood block? \"Yes, but I won't go by myself because I have fallen.\"  Do you feel safe with the people you live with? Yes.   Do you see a therapist or counselor? “We were seeing a marriage counselor earlier this year 1 x week and the memory issue became a problem. His memory of what happenedwas inaccurate and we were not making progress.”  Do you see a psychiatrist? No.     “My depression comes from not being able to do what I used to " "do. 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 I really struggle with this and think about it a lot.”  Per wife, “Loosing things frequently. Unable to see things that are right in front of him even with specific directions. He just can’t see it, for example finding a spoon in the drawer.”    What’s your current stress level (1-5): 2-3 due to stock market, worries frequently. 2 on a typical day.     Tell me about your mood in the last two week? “Pretty good but upset when I fell down and I lost my temper.\"  Per wife, \"He has always had a temper, currently it is better. Only verbal not physical.\"    Suicidal Ideation screening. (CSRS screening if yes)  Do you have any current thoughts of suicide? No.   Do you have any current thoughts that you would be better off dead? “Has made this comment recently but I would not do that because of my kids.”  Do you have any thoughts of wanting to harm another? \" Nobody unless they mess with my family.\"   Do you have family history of suicide? No.  Weapons: Has a CCW but has not been carrying. Per wife, all weapons are in a locked safe and unloaded. Discussed gun locks.     MENTAL STATUS/OBSERVATIONS   MSE has been updated. Please reference screening tab for reference.    Plans and Goals  Is there anything else you would like to share with me about your life and who you are?  “Come up with a way to help me stop forgetting everything.”  Per wife, “I just needs an answer for what this is, what is the name for what he is going through, to understand what is happening. I’m grasping at straws.”    Social Work Care Plan  • For caregiver support, contact the Alzheimer’s Association at 1-702.181.8891 or via their website, www.alz.org. They provide support groups, education and 24 hour support for all types of cognitive impairments. They also have a respite marquita that you will qualify for. It can help you pay for various services, " $1000/month.  Refer to provided [facility] resources.  • Consider seeking in-home support options for respite and when you need caregiving breaks. The Alzheimer's Association has a wealth of knowledge and information about in-home care giving services and agencies. Please look at their web site or make an appointment to speak with them.  https://www.alz.org/help-support/caregiving/care-options/in-home-care. [facility] handout given.  •Consider a home safety evaluation. An option is [facility] - [address] [place] [address] ([phone]). The [facility] at [facility] can evaluate your home and also help install grab bars, etc. There is a cost that is not covered by insurance.    •Consider attending an Adult Day Program. These services provide socialization, exercise and live entertainment. Some options are: [facility] - [address] [place] [address] ([phone]); [facility] - [address] [facility] [place] [address] ([phone]); and [facility] - [address] [place] [address] ([phone]).

## 2022-06-16 NOTE — Clinical Note
Hi, I told the patient/care partner the respite marquita was $1000/month, not per year. Could you please let them know of my error

## 2022-06-19 PROBLEM — F32.A ANXIETY AND DEPRESSION: Status: ACTIVE | Noted: 2022-06-19

## 2022-06-19 PROBLEM — F01.50 VASCULAR DEMENTIA WITHOUT BEHAVIORAL DISTURBANCE (HCC): Status: ACTIVE | Noted: 2022-06-19

## 2022-06-19 PROBLEM — F41.9 ANXIETY AND DEPRESSION: Status: ACTIVE | Noted: 2022-06-19

## 2022-06-19 NOTE — ASSESSMENT & PLAN NOTE
Worsened with increase in fluoxteine  Also possible that patient has some orthostatic in nature given us of BP meds  Reviewed [facility] cardiogram from 10/2021; demonstrated moderate AI, encouraged patient to f/u with cardiology regarding this, though not clearly severe enough to be cause of dizziness/syncope    See HTN and Depresison

## 2022-06-19 NOTE — ASSESSMENT & PLAN NOTE
Patient recently started on fluoxetine with improvement in symptoms. When increase antwon 10 to 20 mg dizziness worsened, now back on 10 mg.   Discussed with patient that fluoxetine has a long half life.   Preferred antidepressants/anti anxiety meds in elders include escitalopram, citalopram, or sertraline. Recommend PCP consider use of these in lieu of fluoxetine as they are less likely to have side effects; did discuss they may also cause dizziness.   Recommend counseling

## 2022-06-19 NOTE — ASSESSMENT & PLAN NOTE
Given age and A1c, agree with PCP minimizing use of metformin. Goal A1c is about 7.5%  Discussed that patient could consider stopping metformin and regular CBG checks and just monitor with A1c.

## 2022-06-19 NOTE — ASSESSMENT & PLAN NOTE
Provided BP log and instructions so patient can f/u with PCP. If dizziness does not improve with adjustment of depression medications would consider adjusting these medications. Consider decrease CCB first given mild ankle swelling and that this can be a side effect.

## 2022-06-19 NOTE — ASSESSMENT & PLAN NOTE
The patient's cognition does appear to be effecting their IADL status. This does suggest the presence of a neurocognitive disorder. Delirium, mood, and acute psychiatric conditions were considered in the differential. Patient does have subjective complaints and a physical exam finding that does warrant continued work up.  Given history of stepwise declines after TIAs and hospital stays vascular type of dementia likely predominates, though he could have mixed as well given significant memory loss. Patient has preserved insight which significantly affects mood and this should continue to be addressed, see depression    Recommendations  -Recommend labs that were not available for review: CBC, CMP,  B12, TSH, ft4, RPR and HIV to rule out reversible causes of cognitive changes.  -We discussed diet and exercise as important mitigating measures regarding progressive cognitive impairment.  -continue secondary prevention for strokes.    -consider minimizing OTC polypharmacy, provided supplement handout.   -will request neuropsychology evaluation and neurology notes.

## 2022-06-19 NOTE — ASSESSMENT & PLAN NOTE
Patient also with history of smoking  Gets SOB with ambulation   Discussed use of inhalers that are used on a regular basis and those that are used as needed.  Recommend PCP to consider transition from COMBIVENT to Spiriva to decrease the amount of inhalations needed per day.

## 2022-06-21 ENCOUNTER — OFFICE VISIT (OUTPATIENT)
Dept: NEUROLOGY | Facility: MEDICAL CENTER | Age: 77
End: 2022-06-21
Attending: PSYCHIATRY & NEUROLOGY
Payer: MEDICARE

## 2022-06-21 VITALS
DIASTOLIC BLOOD PRESSURE: 64 MMHG | OXYGEN SATURATION: 95 % | TEMPERATURE: 97.5 F | HEART RATE: 65 BPM | RESPIRATION RATE: 16 BRPM | HEIGHT: 71 IN | WEIGHT: 197.75 LBS | SYSTOLIC BLOOD PRESSURE: 128 MMHG | BODY MASS INDEX: 27.69 KG/M2

## 2022-06-21 DIAGNOSIS — F03.90 DEMENTIA WITHOUT BEHAVIORAL DISTURBANCE, UNSPECIFIED DEMENTIA TYPE: ICD-10-CM

## 2022-06-21 PROBLEM — I35.1 MODERATE AORTIC INSUFFICIENCY: Status: ACTIVE | Noted: 2022-06-21

## 2022-06-21 PROBLEM — F01.50 VASCULAR DEMENTIA WITHOUT BEHAVIORAL DISTURBANCE (HCC): Status: RESOLVED | Noted: 2022-06-19 | Resolved: 2022-06-21

## 2022-06-21 PROCEDURE — 99212 OFFICE O/P EST SF 10 MIN: CPT | Performed by: PSYCHIATRY & NEUROLOGY

## 2022-06-21 PROCEDURE — 99204 OFFICE O/P NEW MOD 45 MIN: CPT | Performed by: PSYCHIATRY & NEUROLOGY

## 2022-06-21 ASSESSMENT — FIBROSIS 4 INDEX: FIB4 SCORE: 2.09

## 2022-06-21 NOTE — ASSESSMENT & PLAN NOTE
"Seen on echocardiogram 10/20/2021.  Recommend follow-up with cardiology,\" not clearly causing significant hemodynamically significant changes but would want to get their opinion given issues with syncope.  "

## 2022-06-21 NOTE — Clinical Note
Grupo,  As a heads up, I would like for you to see this patient for dementia. He was seen at [facility] and was diagnosed with vascular dementia and sent here. Initially they wanted to see you but since you were booked out, I saw him. I suspect that he has Alzheimer dementia, but am hoping that you and Walker can clarify this for him.

## 2022-06-21 NOTE — PROGRESS NOTES
Chief Complaint   Patient presents with   • New Patient     Dizziness and giddiness       History of present illness:  Mello Isabel 77 y.o. male with DM2 with cognitive problems with forgetfulness and getting lost driving. He was seen at the [facility] last week. He was diagnosed with vascular dementia by a geriatrics MD.   He has been forgetting names and was getting lost while driving. He is accompanied by his wife who has to inform him of appointments and write down events on calendars.   He is forgetting family names and searches hard for everyday words and objects. He will forget appointments and has a hard time comprehending following a conversation or a TV show.   This has been going on for a few years.     Past medical history:   Past Medical History:   Diagnosis Date   • Allergy to pollen    • Asthma, mild persistent    • DM (diabetes mellitus) type II controlled peripheral vascular disorder    • Essential hypertension    • Hyperlipidemia LDL goal <70    • TIA (transient ischemic attack) 2012    x2       Past surgical history:   Past Surgical History:   Procedure Laterality Date   • PB KNEE SCOPE,MED/LAT MENISECTOMY Right 2/25/2022    Procedure: RIGHT KNEE ARTHROSCOPY PARTIAL MEDIAL MENISCECTOMY, POSSIBLE CHONDROPLASTY, REPAIRS AS INDICATED;  Surgeon: Tremaine Sarkar M.D.;  Location: [facility];  Service: Orthopedics   • CATARACT PHACO WITH IOL      gilbert eyes   • KNEE ARTHROSCOPY      x2 left   • RETINAL DETACHMENT REPAIR      gilbert eyes       Family history:   Family History   Problem Relation Age of Onset   • Cancer Mother         liver   • Stroke Father    • Hypertension Brother    • Hyperlipidemia Brother    • Diabetes Brother    • Hypertension Brother    • Hyperlipidemia Brother        Social history:   Social History     Socioeconomic History   • Marital status:      Spouse name: Not on file   • Number of children: Not on file   • Years of education: Not on file    • Highest education level: Not on file   Occupational History   • Occupation:      Comment: self employed,   Tobacco Use   • Smoking status: Former Smoker     Types: Cigarettes     Quit date:      Years since quittin.4   • Smokeless tobacco: Former User     Types: Chew     Quit date:    Vaping Use   • Vaping Use: Never used   Substance and Sexual Activity   • Alcohol use: No     Alcohol/week: 0.0 oz   • Drug use: No   • Sexual activity: Not on file   Other Topics Concern   • Not on file   Social History Narrative   • Not on file     Social Determinants of Health     Financial Resource Strain: Low Risk    • Difficulty of Paying Living Expenses: Not very hard   Food Insecurity: No Food Insecurity   • Worried About Running Out of Food in the Last Year: Never true   • Ran Out of Food in the Last Year: Never true   Transportation Needs: No Transportation Needs   • Lack of Transportation (Medical): No   • Lack of Transportation (Non-Medical): No   Physical Activity: Insufficiently Active   • Days of Exercise per Week: 3 days   • Minutes of Exercise per Session: 20 min   Stress: Stress Concern Present   • Feeling of Stress : Rather much   Social Connections: Moderately Integrated   • Frequency of Communication with Friends and Family: More than three times a week   • Frequency of Social Gatherings with Friends and Family: More than three times a week   • Attends Hoahaoism Services: Never   • Active Member of Clubs or Organizations: Yes   • Attends Club or Organization Meetings: 1 to 4 times per year   • Marital Status:    Intimate Partner Violence: Not At Risk   • Fear of Current or Ex-Partner: No   • Emotionally Abused: No   • Physically Abused: No   • Sexually Abused: No   Housing Stability: Unknown   • Unable to Pay for Housing in the Last Year: No   • Number of Places Lived in the Last Year: Not on file   • Unstable Housing in the Last Year: No       Current medications:  "  Current Outpatient Medications   Medication   • ALBUTEROL SULFATE HFA INH   • acetaminophen (TYLENOL) 500 MG Tab   • Fluticasone Furoate-Vilanterol (BREO) 200-25 MCG/INH AEROSOL POWDER, BREATH ACTIVATED   • Ascorbic Acid (VITAMIN C) 1000 MG Tab   • Cholecalciferol (VITAMIN D3) 125 MCG (5000 UT) Cap   • amLODIPine (NORVASC) 5 MG Tab   • ipratropium-albuterol (COMBIVENT RESPIMAT)  MCG/ACT Aero Soln   • metFORMIN (GLUCOPHAGE) 500 MG Tab   • aspirin EC (ECOTRIN) 81 MG Tablet Delayed Response   • Calcium Carbonate (CALCIUM 500 PO)   • Multiple Vitamin (MULTIVITAMIN PO)   • lisinopril (PRINIVIL) 40 MG tablet   • atorvastatin (LIPITOR) 80 MG tablet   • Blood Glucose Monitoring Suppl SUPPLIES Misc   • FLUoxetine (PROZAC) 10 MG Cap     No current facility-administered medications for this visit.       Medication Allergy:  No Known Allergies    Physical examination:   Vitals:    06/21/22 1431   BP: 128/64   BP Location: Left arm   Patient Position: Sitting   BP Cuff Size: Adult   Pulse: 65   Resp: 16   Temp: 36.4 °C (97.5 °F)   TempSrc: Temporal   SpO2: 95%   Weight: 89.7 kg (197 lb 12 oz)   Height: 1.803 m (5' 11\")     Neurological Exam  Mental Status  Awake and alert. Oriented only to person and place. Orientation: Oriented to Tuesday, to June 2022, but does not know the date. . Recalls 3 of 3 objects immediately. At 5 minutes recalls 1 of 3 objects. Recalls 2 of 3 objects with prompting. Unable to copy figure. Bottom of the 2 interlacing pentagons not drawn correctly. . Clock drawing is abnormal. Draws clock normally but sets time incorrectly. . Speech is normal. Able to name objects, name parts of objects and repeat. Takes paper with left hand but folds in half correctly and placed on floor . Unable to perform serial calculations. [id number]    This task requires a lot of time. Able to spell words backwards. [id number].    Cranial Nerves  CN III, IV, VI: Extraocular movements intact bilaterally. No nystagmus. " Normal smooth pursuit.    Motor   Normal muscle tone. No abnormal involuntary movements.    Coordination  Right: Finger-to-nose normal.Left: Finger-to-nose normal. Rapid alternating movement normal.    Gait  Casual gait: Wide stance. Normal tandem gait.      Labs:  I reviewed the following labs personally:  None     Imaging:   None     ASSESSMENT AND PLAN:  Problem List Items Addressed This Visit     Dementia without behavioral disturbance (HCC)    Relevant Orders    TSH    VIT B12,  FOLIC ACID    RPR (SYPHILIS)    Comp Metabolic Panel    CBC WITH DIFFERENTIAL    Referral to Neurology          1. Dementia without behavioral disturbance, unspecified dementia type (HCC)  - TSH; Future  - VIT B12,  FOLIC ACID  - RPR (SYPHILIS); Future  - Comp Metabolic Panel; Future  - CBC WITH DIFFERENTIAL; Future  - Referral to Neurology    77-year-old male who was referred to neurology by [facility] due to concern for dementia.  He was provided a diagnosis of vascular dementia by the geriatrician.  I am unclear on the rationale behind this diagnosis.  The wife will obtain the 2021 MRI brain and send the images to us.   Based on my overall clinical judgment, the patient does seem demented and is confused during the examination.  There are errors in the Mini-Mental exam, with delayed recall, with clock drawing with copying a figure, however the cognitive areas are not severe.   Laboratory testing for reversible etiologies of dementia has been ordered.  I have ordered a neuropsychological assessment from Dr. Walker Kwok and the patient will follow-up with Dr. Cruz following this.     FOLLOW-UP:   Return in about 3 months (around 9/21/2022) for Long NEW consultation with Dr. Cruz for dementia .    Total time spent for the day for this patient unrelated to procedure time is: 54 minutes. I spent 46 minutes in face to face time and I spent 3 minutes pre-charting and 5 minutes in post-visit documentation.      Dr. Jose Rafael Chopra,  [email or web address].  .  Neurology

## 2022-06-21 NOTE — PATIENT INSTRUCTIONS
I have ordered a neuropsychological evaluation from Dr. Kwok.  You will be contacted to schedule this. He is a PhD neuropsychologist.    Go to the lab to have your blood drawn.  Have this drawn at [facility].  I have placed the orders today.    Request that the brain MRI scan from 2021 the sent to me here at [facility].  If this is not available, contact me so that I can order a another brain MRI scan.    You will follow-up with Dr. Cruz.

## 2022-06-22 ENCOUNTER — TELEPHONE (OUTPATIENT)
Dept: INTERNAL MEDICINE | Facility: OTHER | Age: 77
End: 2022-06-22
Payer: MEDICARE

## 2022-06-22 NOTE — TELEPHONE ENCOUNTER
Wife Zoë called me to express that Dr Chopra doesn't really specialize in Dementia and it would be better if Mello saw Dr Cruz but he's booked out to Nov, I gave her Dr Martel to potentially schedule with. We f/u on [facility] and she states that they have reviewed all of the teams recommendations, saw PCP and had some meds tweaked for his dizziness. Prn f/u with us.

## 2022-08-05 ENCOUNTER — TELEPHONE (OUTPATIENT)
Dept: INTERNAL MEDICINE | Facility: OTHER | Age: 77
End: 2022-08-05
Payer: MEDICARE

## 2022-08-05 NOTE — TELEPHONE ENCOUNTER
Message  Received: Today  Michael Holm M.D.  Flavia Ivory, [facility]  Reviewed. Neuropsych  evaluation on 2/2021; they recommended MRI of brain, He had MRI just one month prior, see my note and media tab. Unless patient has had significant clinical change, no need for repeat work up. Neurology note reviewed, awaiting eval by Dr. Cruz.

## 2022-08-17 ENCOUNTER — HOSPITAL ENCOUNTER (OUTPATIENT)
Dept: RADIOLOGY | Facility: MEDICAL CENTER | Age: 77
End: 2022-08-17
Attending: EMERGENCY MEDICINE
Payer: MEDICARE

## 2022-08-17 ENCOUNTER — HOSPITAL ENCOUNTER (OUTPATIENT)
Facility: MEDICAL CENTER | Age: 77
End: 2022-08-17
Attending: EMERGENCY MEDICINE
Payer: MEDICARE

## 2022-08-17 ENCOUNTER — OFFICE VISIT (OUTPATIENT)
Dept: URGENT CARE | Facility: PHYSICIAN GROUP | Age: 77
End: 2022-08-17
Payer: MEDICARE

## 2022-08-17 VITALS
DIASTOLIC BLOOD PRESSURE: 82 MMHG | RESPIRATION RATE: 18 BRPM | BODY MASS INDEX: 25.06 KG/M2 | HEART RATE: 65 BPM | TEMPERATURE: 97.7 F | WEIGHT: 185 LBS | SYSTOLIC BLOOD PRESSURE: 138 MMHG | OXYGEN SATURATION: 98 % | HEIGHT: 72 IN

## 2022-08-17 DIAGNOSIS — R30.0 DYSURIA: ICD-10-CM

## 2022-08-17 DIAGNOSIS — N45.1 ACUTE EPIDIDYMITIS: ICD-10-CM

## 2022-08-17 LAB
APPEARANCE UR: CLEAR
BILIRUB UR STRIP-MCNC: NEGATIVE MG/DL
COLOR UR AUTO: YELLOW
GLUCOSE UR STRIP.AUTO-MCNC: NEGATIVE MG/DL
KETONES UR STRIP.AUTO-MCNC: NEGATIVE MG/DL
LEUKOCYTE ESTERASE UR QL STRIP.AUTO: NEGATIVE
NITRITE UR QL STRIP.AUTO: NEGATIVE
PH UR STRIP.AUTO: 6 [PH] (ref 5–8)
PROT UR QL STRIP: NEGATIVE MG/DL
RBC UR QL AUTO: NEGATIVE
SP GR UR STRIP.AUTO: 1.01
UROBILINOGEN UR STRIP-MCNC: 0.2 MG/DL

## 2022-08-17 PROCEDURE — 99214 OFFICE O/P EST MOD 30 MIN: CPT | Performed by: EMERGENCY MEDICINE

## 2022-08-17 PROCEDURE — 87086 URINE CULTURE/COLONY COUNT: CPT

## 2022-08-17 PROCEDURE — 76870 US EXAM SCROTUM: CPT

## 2022-08-17 PROCEDURE — 81002 URINALYSIS NONAUTO W/O SCOPE: CPT | Performed by: EMERGENCY MEDICINE

## 2022-08-17 RX ORDER — LEVOFLOXACIN 500 MG/1
500 TABLET, FILM COATED ORAL DAILY
Qty: 10 TABLET | Refills: 0 | Status: SHIPPED | OUTPATIENT
Start: 2022-08-17 | End: 2022-08-27

## 2022-08-17 ASSESSMENT — ENCOUNTER SYMPTOMS
VOMITING: 0
SWEATS: 0
CHILLS: 0
FLANK PAIN: 0
NAUSEA: 0

## 2022-08-17 ASSESSMENT — FIBROSIS 4 INDEX: FIB4 SCORE: 2.09

## 2022-08-17 NOTE — PROGRESS NOTES
Subjective     Mello Isabel is a 77 y.o. male who presents with Dysuria            Dysuria   This is a new problem. Episode onset: 2 days. The problem occurs intermittently. The quality of the pain is described as burning. The pain is moderate. There has been no fever. Associated symptoms include urgency. Pertinent negatives include no chills, discharge, flank pain, frequency, hematuria, nausea, sweats or vomiting. There is no history of recurrent UTIs.   Also notes right scrotal pain.  No trauma.  Review of Systems   Constitutional:  Negative for chills.   Gastrointestinal:  Negative for nausea and vomiting.   Genitourinary:  Positive for dysuria and urgency. Negative for flank pain, frequency and hematuria.        No urethral discharge.   Skin:  Negative for rash.            Objective     /82   Pulse 65   Temp 36.5 °C (97.7 °F) (Temporal)   Resp 18   Ht 1.829 m (6')   Wt 83.9 kg (185 lb)   SpO2 98%   BMI 25.09 kg/m²      Physical Exam  Constitutional:       General: He is not in acute distress.     Appearance: He is well-developed. He is not ill-appearing.   Cardiovascular:      Rate and Rhythm: Normal rate and regular rhythm.      Heart sounds: Normal heart sounds.   Pulmonary:      Effort: Pulmonary effort is normal.      Breath sounds: Normal breath sounds.   Abdominal:      General: There is no distension.      Palpations: Abdomen is soft.      Tenderness: There is no abdominal tenderness. There is no right CVA tenderness or left CVA tenderness.      Hernia: There is no hernia in the left inguinal area or right inguinal area.   Genitourinary:     Pubic Area: No rash.       Penis: Normal and circumcised. No discharge or lesions.       Testes: Normal.         Right: Mass, tenderness or swelling not present.         Left: Mass, tenderness or swelling not present.      Epididymis:      Right: Enlarged. Tenderness present.   Lymphadenopathy:      Lower Body: No right inguinal adenopathy. No left  inguinal adenopathy.   Skin:     General: Skin is warm and dry.      Findings: No rash.   Neurological:      Mental Status: He is alert.   Psychiatric:         Behavior: Behavior is cooperative.                           Assessment & Plan        1. Acute epididymitis  Recommended supportive care measures, including rest/support, increasing oral fluid intake and use of over-the-counter medications for relief of symptoms.   - levoFLOXacin (LEVAQUIN) 500 MG tablet; Take 1 Tablet by mouth every day for 10 days.  Dispense: 10 Tablet; Refill: 0  - [id number]; Future  REF PCP  2. Dysuria  negative- POCT Urinalysis  - URINE CULTURE(NEW); Future

## 2022-08-18 ENCOUNTER — TELEPHONE (OUTPATIENT)
Dept: URGENT CARE | Facility: CLINIC | Age: 77
End: 2022-08-18
Payer: MEDICARE

## 2022-08-18 DIAGNOSIS — N45.1 ACUTE EPIDIDYMITIS: ICD-10-CM

## 2022-08-18 DIAGNOSIS — R93.89 ABNORMAL ULTRASOUND: ICD-10-CM

## 2022-08-18 NOTE — TELEPHONE ENCOUNTER
10:05 am  Notified patient and wife of ultrasound report.  Advised continue plan of antibiotic, follow-up with PCP.  Advised also will place referral to urology.

## 2022-08-20 LAB
BACTERIA UR CULT: NORMAL
SIGNIFICANT IND 70042: NORMAL
SITE SITE: NORMAL
SOURCE SOURCE: NORMAL

## 2022-09-07 ENCOUNTER — TELEPHONE (OUTPATIENT)
Dept: INTERNAL MEDICINE | Facility: OTHER | Age: 77
End: 2022-09-07
Payer: MEDICARE

## 2022-09-07 NOTE — TELEPHONE ENCOUNTER
Wife Zoë is calling hoping that Social Work can reach out to her for some suggestions on communication with Mello. Having bouts of anger and bad choices and her and her daughter are struggling. Please call.

## 2022-09-08 NOTE — TELEPHONE ENCOUNTER
LOAN Mercado 17 hours ago (2:47 PM)     JUDY  Thank  you Magali.   I followed-up with patient's wife Zoë today. Reports that Mello had an escalated verbal altercation with a family member this past weekend and it really concerned Zoë and other family members who observed the incident. [place] that Mello discontinued fluoxetine approximately 4-5 months ago and did not follow-up with recommendation from [facility] to consider escitalopram, citalopram, or sertraline. I reiterated the need to connect with PCP to discuss medications. I learned that Zoë is concerned about the side effects of antidepressant medications including possibility of suicidal thoughts. Reports that they went by [facility] program and it was not a fit because they will not be vaccinated for COVID and that is a requirement for this program. Suggested they reach out to [facility] for their participation requirements. I also reiterated the need the need to connect with the [employer] for education and support groups. Zoë agrees to maintain contact with  should additional questions arise.

## 2022-09-30 ENCOUNTER — HOSPITAL ENCOUNTER (OUTPATIENT)
Dept: LAB | Facility: MEDICAL CENTER | Age: 77
End: 2022-09-30
Attending: PHYSICIAN ASSISTANT
Payer: MEDICARE

## 2022-09-30 ENCOUNTER — HOSPITAL ENCOUNTER (OUTPATIENT)
Dept: LAB | Facility: MEDICAL CENTER | Age: 77
End: 2022-09-30
Attending: PSYCHIATRY & NEUROLOGY
Payer: MEDICARE

## 2022-09-30 DIAGNOSIS — F03.90 DEMENTIA WITHOUT BEHAVIORAL DISTURBANCE, UNSPECIFIED DEMENTIA TYPE: ICD-10-CM

## 2022-09-30 LAB
ALBUMIN SERPL BCP-MCNC: 4.2 G/DL (ref 3.2–4.9)
ALBUMIN/GLOB SERPL: 2 G/DL
ALP SERPL-CCNC: 67 U/L (ref 30–99)
ALT SERPL-CCNC: 14 U/L (ref 2–50)
ANION GAP SERPL CALC-SCNC: 7 MMOL/L (ref 7–16)
AST SERPL-CCNC: 22 U/L (ref 12–45)
BASOPHILS # BLD AUTO: 0.9 % (ref 0–1.8)
BASOPHILS # BLD: 0.05 K/UL (ref 0–0.12)
BILIRUB SERPL-MCNC: 0.3 MG/DL (ref 0.1–1.5)
BUN SERPL-MCNC: 20 MG/DL (ref 8–22)
CALCIUM SERPL-MCNC: 9.2 MG/DL (ref 8.5–10.5)
CHLORIDE SERPL-SCNC: 108 MMOL/L (ref 96–112)
CO2 SERPL-SCNC: 26 MMOL/L (ref 20–33)
CREAT SERPL-MCNC: 1.04 MG/DL (ref 0.5–1.4)
EOSINOPHIL # BLD AUTO: 0.21 K/UL (ref 0–0.51)
EOSINOPHIL NFR BLD: 3.6 % (ref 0–6.9)
ERYTHROCYTE [DISTWIDTH] IN BLOOD BY AUTOMATED COUNT: 43.6 FL (ref 35.9–50)
FOLATE SERPL-MCNC: 15.4 NG/ML
GFR SERPLBLD CREATININE-BSD FMLA CKD-EPI: 74 ML/MIN/1.73 M 2
GLOBULIN SER CALC-MCNC: 2.1 G/DL (ref 1.9–3.5)
GLUCOSE SERPL-MCNC: 96 MG/DL (ref 65–99)
HCT VFR BLD AUTO: 40 % (ref 42–52)
HGB BLD-MCNC: 13.2 G/DL (ref 14–18)
IMM GRANULOCYTES # BLD AUTO: 0.01 K/UL (ref 0–0.11)
IMM GRANULOCYTES NFR BLD AUTO: 0.2 % (ref 0–0.9)
LYMPHOCYTES # BLD AUTO: 1.99 K/UL (ref 1–4.8)
LYMPHOCYTES NFR BLD: 34 % (ref 22–41)
MCH RBC QN AUTO: 29.3 PG (ref 27–33)
MCHC RBC AUTO-ENTMCNC: 33 G/DL (ref 33.7–35.3)
MCV RBC AUTO: 88.7 FL (ref 81.4–97.8)
MONOCYTES # BLD AUTO: 0.4 K/UL (ref 0–0.85)
MONOCYTES NFR BLD AUTO: 6.8 % (ref 0–13.4)
NEUTROPHILS # BLD AUTO: 3.19 K/UL (ref 1.82–7.42)
NEUTROPHILS NFR BLD: 54.5 % (ref 44–72)
NRBC # BLD AUTO: 0 K/UL
NRBC BLD-RTO: 0 /100 WBC
PLATELET # BLD AUTO: 161 K/UL (ref 164–446)
PMV BLD AUTO: 13 FL (ref 9–12.9)
POTASSIUM SERPL-SCNC: 4.7 MMOL/L (ref 3.6–5.5)
PROT SERPL-MCNC: 6.3 G/DL (ref 6–8.2)
RBC # BLD AUTO: 4.51 M/UL (ref 4.7–6.1)
SODIUM SERPL-SCNC: 141 MMOL/L (ref 135–145)
T PALLIDUM AB SER QL IA: NORMAL
TSH SERPL DL<=0.005 MIU/L-ACNC: 1.19 UIU/ML (ref 0.38–5.33)
VIT B12 SERPL-MCNC: 1413 PG/ML (ref 211–911)
WBC # BLD AUTO: 5.9 K/UL (ref 4.8–10.8)

## 2022-09-30 PROCEDURE — 85025 COMPLETE CBC W/AUTO DIFF WBC: CPT

## 2022-09-30 PROCEDURE — 80053 COMPREHEN METABOLIC PANEL: CPT

## 2022-09-30 PROCEDURE — 84154 ASSAY OF PSA FREE: CPT

## 2022-09-30 PROCEDURE — 86780 TREPONEMA PALLIDUM: CPT | Mod: GA

## 2022-09-30 PROCEDURE — 82607 VITAMIN B-12: CPT

## 2022-09-30 PROCEDURE — 82746 ASSAY OF FOLIC ACID SERUM: CPT

## 2022-09-30 PROCEDURE — 84153 ASSAY OF PSA TOTAL: CPT | Mod: GA

## 2022-09-30 PROCEDURE — 84443 ASSAY THYROID STIM HORMONE: CPT

## 2022-09-30 PROCEDURE — 36415 COLL VENOUS BLD VENIPUNCTURE: CPT | Mod: GA

## 2022-10-03 LAB
PSA FREE MFR SERPL: 33 %
PSA FREE SERPL-MCNC: 0.8 NG/ML
PSA SERPL-MCNC: 2.4 NG/ML (ref 0–4)

## 2022-11-02 ENCOUNTER — PATIENT MESSAGE (OUTPATIENT)
Dept: HEALTH INFORMATION MANAGEMENT | Facility: OTHER | Age: 77
End: 2022-11-02

## 2022-11-10 ENCOUNTER — OFFICE VISIT (OUTPATIENT)
Dept: NEUROLOGY | Facility: MEDICAL CENTER | Age: 77
End: 2022-11-10
Attending: PSYCHIATRY & NEUROLOGY
Payer: MEDICARE

## 2022-11-10 VITALS
RESPIRATION RATE: 14 BRPM | HEIGHT: 73 IN | BODY MASS INDEX: 24.89 KG/M2 | WEIGHT: 187.83 LBS | DIASTOLIC BLOOD PRESSURE: 64 MMHG | TEMPERATURE: 98.4 F | OXYGEN SATURATION: 99 % | SYSTOLIC BLOOD PRESSURE: 142 MMHG | HEART RATE: 61 BPM

## 2022-11-10 DIAGNOSIS — G30.9 ALZHEIMER'S DISEASE, UNSPECIFIED (CODE) (HCC): ICD-10-CM

## 2022-11-10 DIAGNOSIS — F03.A0 MILD NEURODEGENERATIVE DEMENTIA (HCC): Primary | ICD-10-CM

## 2022-11-10 DIAGNOSIS — Z86.73 HISTORY OF TIAS: ICD-10-CM

## 2022-11-10 DIAGNOSIS — G94 OTHER DISORDERS OF BRAIN IN DISEASES CLASSIFIED ELSEWHERE: ICD-10-CM

## 2022-11-10 PROCEDURE — 99215 OFFICE O/P EST HI 40 MIN: CPT | Performed by: PSYCHIATRY & NEUROLOGY

## 2022-11-10 PROCEDURE — 99212 OFFICE O/P EST SF 10 MIN: CPT | Performed by: PSYCHIATRY & NEUROLOGY

## 2022-11-10 RX ORDER — FLUTICASONE PROPIONATE AND SALMETEROL 250; 50 UG/1; UG/1
POWDER RESPIRATORY (INHALATION)
COMMUNITY
Start: 2022-11-09 | End: 2023-01-16

## 2022-11-10 RX ORDER — SERTRALINE HYDROCHLORIDE 25 MG/1
25 TABLET, FILM COATED ORAL DAILY
COMMUNITY
Start: 2022-09-14 | End: 2023-06-09 | Stop reason: SDUPTHER

## 2022-11-10 RX ORDER — LISINOPRIL 20 MG/1
40 TABLET ORAL DAILY
COMMUNITY
Start: 2022-08-27 | End: 2023-04-05

## 2022-11-10 ASSESSMENT — MONTREAL COGNITIVE ASSESSMENT (MOCA)
2. COPY DRAWING: 0/1
8. SERIAL SUBTRACTION OF 7S: 1/5
6. READ LIST OF DIGITS [FORWARD/BACKWARD]: 2/2
WHAT IS THE TOTAL SCORE (OUT OF 30): 17
ORIENTATION SUBSCORE: 4/6
3. DRAW A CLOCK: CONTOUR, NUMBERS, HANDS: 1/3
DELAYED RECALL SUBSCORE: 3/5
11. FOR EACH PAIR OF WORDS, WHAT CATEGORY DO THEY BELONG TO (OUT OF 2): 1/2
4. NAME EACH OF THE THREE ANIMALS SHOWN: 2/3
10. [FLUENCY] NAME WORDS STARTING WITH DESIGNATED LETTER: 0/1
5. MEMORY TRIALS: SECOND TRIAL
WHAT IS THE VERSION OF MOCA ADMINISTERED: 8.3
1. ALTERNATING TRAIL MAKING: 0/1
9. REPEAT EACH SENTENCE: 2/2
7. [VIGILENCE] TAP WHEN HEARING DESIGNATED LETTER: 1/1

## 2022-11-10 ASSESSMENT — FIBROSIS 4 INDEX: FIB4 SCORE: 2.81

## 2022-11-10 ASSESSMENT — PATIENT HEALTH QUESTIONNAIRE - PHQ9: CLINICAL INTERPRETATION OF PHQ2 SCORE: 0

## 2022-11-10 NOTE — PROGRESS NOTES
"Neuro note:    See by Dr. Chopra in June 2022: Note reviewed.    Reason for Neurology Consult:  Concern for Dementia.    History of present illness:    Mello Isabel 77 y.o. right handed gentleman who is from [facility] and then moved to [place] and then [facility] about 7 years ago.  He was a . He is retired.    Problem List    He is here with her his wife and daughter.    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 He is aware of this memory disturbance(s).    He is able to read and just finished a book (read every day- mystery books).    Feb 2011 and Feb 2012> both events in middle of life> came out of bedroom and talking \"funny\" and \"see I put my watch on\">>  the initial episode was about 1-2 hours. The 1st event - he was seen in [place] and told he had a TIA event.  2nd event- lasted less than 1 hour (and less dramatic)> 911 called> [facility] (admitted overnight).    Wife noticed in [id number] Mello noticed a slight degree of agitation and over the last 2 years he is forgetting  every day words (like remembering different foods and objects)> even grand children names have become hard to remember.     He has started to comment about where he is located when being a passenger in a car - in the last 6-12 moths or so.    No falls or near falls in the last 6 months.    No seizure type events known,reported to me today or in problem list in the last several years.    He has repeat information and often  will ask his wife something and repeat information within 30 minutes and often repeating himself 2 to 4 days.    1 to 2 years ago, he stopped at a red light and then within seconds after stopping and then after a few seconds he thought the light was green and immediately starting to drive.    No progressive gait-balance decline or " involuntary movements of the limbs in the last 3-6 months.    Wife has noticed that he will have difficulty hanging clothes up over the last 12 months and sometimes putting his jacket on backwards or upside side.    Heavy smoker- from age 20 (1-2 packs per day) and quit in early 90(s).  Drank- drank low level for over 30 years> beer and wife and has stopped drinking since 1991      Mother's Sister:  Alzheimer's type Dementia (late in life).        Patient Active Problem List    Diagnosis Date Noted    Moderate aortic insufficiency 06/21/2022    Dementia without behavioral disturbance (HCC) 06/21/2022    Anxiety and depression 06/19/2022    Complex tear of medial meniscus of right knee as current injury 02/01/2022    Pain in the chest 10/27/2021    Dizziness 10/27/2021    Moderate asthma 10/27/2021    Leukocytosis 10/27/2021    Lumbar spondylosis 06/23/2021    Other intervertebral disc degeneration, lumbar region 06/23/2021    Myofascial pain syndrome of lumbar spine 06/23/2021    Chronic right-sided low back pain with right-sided sciatica 06/23/2021    Allergy to pollen     Asthma, mild persistent     DM (diabetes mellitus) type II controlled peripheral vascular disorder     TIA (transient ischemic attack)     Essential hypertension     Hyperlipidemia LDL goal <70        Past medical history:   Past Medical History:   Diagnosis Date    Allergy to pollen     Asthma, mild persistent     DM (diabetes mellitus) type II controlled peripheral vascular disorder     Essential hypertension     Hyperlipidemia LDL goal <70     TIA (transient ischemic attack) 2012    x2       Past surgical history:   Past Surgical History:   Procedure Laterality Date    PB KNEE SCOPE,MED/LAT MENISECTOMY Right 2/25/2022    Procedure: RIGHT KNEE ARTHROSCOPY PARTIAL MEDIAL MENISCECTOMY, POSSIBLE CHONDROPLASTY, REPAIRS AS INDICATED;  Surgeon: Tremaine Sarkar M.D.;  Location: [facility];  Service: Orthopedics    CATARACT PHACO  WITH IOL      gilbert eyes    KNEE ARTHROSCOPY      x2 left    RETINAL DETACHMENT REPAIR      gilbert eyes         Social history:   Social History     Socioeconomic History    Marital status:      Spouse name: Not on file    Number of children: Not on file    Years of education: Not on file    Highest education level: Not on file   Occupational History    Occupation:      Comment: self employed,   Tobacco Use    Smoking status: Former     Types: Cigarettes     Quit date:      Years since quittin.8    Smokeless tobacco: Former     Types: Chew     Quit date:    Vaping Use    Vaping Use: Never used   Substance and Sexual Activity    Alcohol use: No     Alcohol/week: 0.0 oz    Drug use: No    Sexual activity: Not on file   Other Topics Concern    Not on file   Social History Narrative    Not on file     Social Determinants of Health     Financial Resource Strain: Low Risk     Difficulty of Paying Living Expenses: Not very hard   Food Insecurity: No Food Insecurity    Worried About Running Out of Food in the Last Year: Never true    Ran Out of Food in the Last Year: Never true   Transportation Needs: No Transportation Needs    Lack of Transportation (Medical): No    Lack of Transportation (Non-Medical): No   Physical Activity: Insufficiently Active    Days of Exercise per Week: 3 days    Minutes of Exercise per Session: 20 min   Stress: Stress Concern Present    Feeling of Stress : Rather much   Social Connections: Moderately Integrated    Frequency of Communication with Friends and Family: More than three times a week    Frequency of Social Gatherings with Friends and Family: More than three times a week    Attends Latter day Services: Never    Active Member of Clubs or Organizations: Yes    Attends Club or Organization Meetings: 1 to 4 times per year    Marital Status:    Intimate Partner Violence: Not At Risk    Fear of Current or Ex-Partner: No    Emotionally Abused: No     "Physically Abused: No    Sexually Abused: No   Housing Stability: Unknown    Unable to Pay for Housing in the Last Year: No    Number of Places Lived in the Last Year: Not on file    Unstable Housing in the Last Year: No       Family history:   Family History   Problem Relation Age of Onset    Cancer Mother         liver    Stroke Father     Hypertension Brother     Hyperlipidemia Brother     Diabetes Brother     Hypertension Brother     Hyperlipidemia Brother          Current medications:   Current Outpatient Medications   Medication    ALBUTEROL SULFATE HFA INH    Fluticasone Furoate-Vilanterol (BREO) 200-25 MCG/INH AEROSOL POWDER, BREATH ACTIVATED    Ascorbic Acid (VITAMIN C) 1000 MG Tab    Cholecalciferol (VITAMIN D3) 125 MCG (5000 UT) Cap    ipratropium-albuterol (COMBIVENT RESPIMAT)  MCG/ACT Aero Soln    aspirin EC (ECOTRIN) 81 MG Tablet Delayed Response    Calcium Carbonate (CALCIUM 500 PO)    Multiple Vitamin (MULTIVITAMIN PO)    lisinopril (PRINIVIL) 40 MG tablet    atorvastatin (LIPITOR) 80 MG tablet    sertraline (ZOLOFT) 25 MG tablet    acetaminophen (TYLENOL) 500 MG Tab    amLODIPine (NORVASC) 5 MG Tab    metFORMIN (GLUCOPHAGE) 500 MG Tab    FLUoxetine (PROZAC) 10 MG Cap    Blood Glucose Monitoring Suppl SUPPLIES Misc     No current facility-administered medications for this visit.       Medication Allergy:  No Known Allergies        Physical examination:   Vitals:    11/10/22 1254   BP: (!) 142/64   BP Location: Right arm   Patient Position: Sitting   BP Cuff Size: Adult   Pulse: 61   Resp: 14   Temp: 36.9 °C (98.4 °F)   TempSrc: Temporal   SpO2: 99%   Weight: 85.2 kg (187 lb 13.3 oz)   Height: 1.854 m (6' 1\")       Normal cephalic atraumatic.  There is full range of movement around the neck in all directions without restrictions or discrete pain evoked triggers.  No lower extremity edema.      Neurological  Exam:      Mateusz Cognitive Assessment (MOCA) Version 7.1    Years of Education: High " School    TOTAL SCORE: 16/30  (to be scanned into the MEDIA section in the E.M.R.)          Mental status: Awake, alert and fully oriented to person, place, and situation. Normal attention and concentration.  Did not appear/act combative,irritable,anxious,paranoid/delusional or aggressive to or with me.    Speech and language: Speech is fluent without errors, clear, intact to repetition, and intact to naming.     Follows 3 step motor commands in sequence without significant delay and correctly.    Cranial nerve exam:  II: Pupils are equally round and reactive to light. Visual fields are intact by confrontation.  III, IV, VI: EOMI, no diplopia, no ptosis.  V: Sensation to light touch is normal over V1-3 distributions bilaterally.  .  VII: Facial movements are symmetrical. There is no facial droop. .  VIII: Hearing intact to soft speech and finger rub bilaterally  IX: Palate elevates symmetrically, uvula is midline. Dysarthria is not present.  XI: Shoulder shrug are symmetrical and strong.   XII: Tongue protrudes midline.      Motor exam:  Muscle tone is normal in all 4 limbs. and No abnormal movements appreciated.    Muscle strength:    Neck Flexors/Extensors: 5/5       Right  Left  Deltoid   5/5  5/5      Biceps   5/5  5/5  Triceps              5/5  5/5   Wrist extensors 5/5  5/5  Wrist flexors  5/5  5/5     5/5  5/5  Interossei  5/5  5/5  Thenar (APB)  5/5  5/5   Hip flexors  5/5  5/5  Quadriceps  5/5  5/5    Hamstrings  5/5  5/5  Dorsiflexors  5/5  5/5  Plantarflexors  5/5  5/5  Toe extension  5/5  5/5      Reflexes:       Right  Left  Biceps   2/4  2/4  Triceps              2/4  2/4  Brachioradialis    2/4  2/4  Knee jerk  2/4  2/4  Ankle jerk  2/4  2/4     Frontal release signs are absent    bilateral toes are downgoing to plantar stimulation..    Coordination (finger-to-nose, heel/knee/shin, rapid alternating movements) was normal.     There was no ataxia, no tremors, and no dysmetria.     Station and gait  were normal.     Easily stands up from exam chair without retropulsion,veering,leaning,swaying (to either side).       Labs and Tests:    9/2022: B12: over 1000  Folate:  15.4  T Pallidum: wnl  TSH- wnl     NEUROIMAGING: no recent brain imaging        Impression/Plans/Recommendations:      Mild to Moderate Stage of a Neurodegenerative Dementia - onset of memory disturbance(s) over 5 years ago and slow progression of memory and cognitive changes evident to daughter and wife over the last 2-3 years.     Mello to me does not have good insight into this  disturbances.    There has been mild degrees of periodic agitation without akanksha behavioral,personality changes or akanksha psychosis.    MOCA of 17/30 today with problems with executive functioning noticed by wife/daughter and evident on MOCA testing (Mello had great difficulty with all the executive function testing)    FAQ score of 12 today per daughter and     Global Deterioration Score of 4 to 5 range per daughter    I am keeping up with editorials and  comments from  many academic neurologists throughout the [place] who are writing reviews and summaries of the present state of this provisionally approved anti amyloid compound (aducanumab)    The FDA's Central and Peripheral Nervous System Advisory Committee voted 10-1 against the compound (the 1 person voted “uncertain”) in that the data did not confirm or support meaningful clinical benefit.      I have read that several senior research neurologists have even commented the compound did nothing clinically meaningful or useful and moreover there was no  clear evidence it prevented the clinical deterioration  of the patients' condition despite potentially removing some amyloid from their brain(s)  tissue.      Based on the critique of the data so far,  there was no  clear scientific evidence this anti amyloid intravenous compound reduced or halted the underlying disease or slowed down the inherent clinical deterioration  expected with the Alzheimer's type of Dementia. The clinical data did also not suggest that activities of daily living were improved upon with this compound.    I have discussed with the patient and family today that given  the great controversy about the study's data and analysis of the lack of clinical  efficacy to this point in time, I feel that I need to wait and see reasonable post marketing data and/or more robust efficacy data supported by the academic community and/or the American Academy of Neurology  before making a commitment to prescribe such a compound and  where there is more than  a minor/minimal amount of risk to the patient involved in being administered this compound on a chronic (monthly) basis.     Plans:    A. Brain MRI to be done and discussion of Brain PET Scan and then proceed with Brain PET Scan.    B. Vitamin B1,B9 and B12    C. No Driving at this time    D. Alzheimer's Connect Form to be filled out    E. Stroke related risk factors reviewed today.    I have performed  a history and physical exam and a directed /focused  ROS today.    Total time spent today or this patient's care was 65 minutes  and included reviewing  the diagnostic workup to date (such as labs and imaging as well as interpreting such tests relevant to this patient's neurological condition),  reviewing/obtaining separately obtained history (from patient and/or accompanying ffamily member)  for today's neurological problem(s) ,counseling and educating the patient and family member on issues related to cognition/memory and cognitive health factors and documenting  the clinical information in the EMR.    Follow up at this point RAFFI Cruz MD  [facility].   [facility]

## 2022-11-21 ENCOUNTER — TELEPHONE (OUTPATIENT)
Dept: INTERNAL MEDICINE | Facility: OTHER | Age: 77
End: 2022-11-21
Payer: MEDICARE

## 2022-11-21 NOTE — TELEPHONE ENCOUNTER
Daughter Shira had originally self referred her dad for our assessment, he was seen in June. She is now leaving a voicemail asking for SW to contact her about supportive care help. He did see Neuro. Please call Shira @ [phone]

## 2022-11-23 ENCOUNTER — TELEPHONE (OUTPATIENT)
Dept: NEUROLOGY | Facility: MEDICAL CENTER | Age: 77
End: 2022-11-23
Payer: MEDICARE

## 2022-11-23 NOTE — TELEPHONE ENCOUNTER
VOICEMAIL  1. Caller Name: Zoë                      Call Back Number: [phone] (home)      2. Message: Wife states MRI PET Scan and Labs were to be ordered but only thing that was sent to imaging was MRI. Would like to know when the PET Scan and Labs will be ordered? Would like a call back.     3. Patient approves office to leave a detailed [facility]icemail[facility] N\A    Routed this message to Dr Cruz on 11/23/22

## 2022-11-29 NOTE — TELEPHONE ENCOUNTER
Daughter just called again, she is still asking to speak to Social Work but she wanted to know who she could schedule her mom and dad with for Psychology/Psychiatry that specializes in Alz/dementia-I gave her [facility] info.

## 2022-11-30 ENCOUNTER — PATIENT OUTREACH (OUTPATIENT)
Dept: INTERNAL MEDICINE | Facility: OTHER | Age: 77
End: 2022-11-30

## 2022-11-30 DIAGNOSIS — I10 ESSENTIAL HYPERTENSION: ICD-10-CM

## 2022-11-30 DIAGNOSIS — F01.50 VASCULAR DEMENTIA WITHOUT BEHAVIORAL DISTURBANCE (HCC): ICD-10-CM

## 2022-12-01 NOTE — PROGRESS NOTES
55 minutes    SW returned call to patient's daughter Shira ([phone]). Reports that Mello has seen neurology and now have an official diagnosis of dementia. Reviewed recommendations from [place] [facility] and endorsed need to connect with Alzheimer's Association, [facility] [place] and a geropsychologist/individual therapist/psychiatrist for her mother Zoë to discuss her thoughts/feelings. SW highly encouraged attending Dementia Conversations with Daniel Zacarias. Shira agrees to maintain contact with SW should additional issues arise.

## 2022-12-01 NOTE — TELEPHONE ENCOUNTER
55 minutes     SW returned call to patient's daughter Shira ([phone]). Reports that Mello has seen neurology and now have an official diagnosis of dementia. Reviewed recommendations from [place] [facility] Assessment and endorsed need to connect with Alzheimer's Association, [facility] [place] and a geropsychologist/individual therapist/psychiatrist for her mother Zoë to discuss her thoughts/feelings. SW highly encouraged attending Dementia Conversations with Daniel Zacarias. Shira agrees to maintain contact with SW should additional issues arise.   November 30, 2022  Karen Lin

## 2023-01-13 ENCOUNTER — TELEPHONE (OUTPATIENT)
Dept: NEUROLOGY | Facility: MEDICAL CENTER | Age: 78
End: 2023-01-13
Payer: MEDICARE

## 2023-01-13 NOTE — TELEPHONE ENCOUNTER
01/13/23  Per Dr Cruz I called to speak with Mello' wife and let her know that the PET Scan would not be ordered until the Vitamin levels and brain MRI had been completed and looked at by Dr Cruz. Patients wife stated that the appointments had been made for both and communicated understanding. HL

## 2023-01-16 ENCOUNTER — OFFICE VISIT (OUTPATIENT)
Dept: URGENT CARE | Facility: PHYSICIAN GROUP | Age: 78
End: 2023-01-16
Payer: MEDICARE

## 2023-01-16 ENCOUNTER — APPOINTMENT (OUTPATIENT)
Dept: RADIOLOGY | Facility: MEDICAL CENTER | Age: 78
End: 2023-01-16
Attending: PHYSICIAN ASSISTANT
Payer: MEDICARE

## 2023-01-16 VITALS
HEART RATE: 57 BPM | BODY MASS INDEX: 25.73 KG/M2 | OXYGEN SATURATION: 96 % | RESPIRATION RATE: 18 BRPM | HEIGHT: 72 IN | TEMPERATURE: 98 F | SYSTOLIC BLOOD PRESSURE: 128 MMHG | DIASTOLIC BLOOD PRESSURE: 68 MMHG | WEIGHT: 190 LBS

## 2023-01-16 DIAGNOSIS — J45.31 MILD PERSISTENT ASTHMA WITH EXACERBATION: ICD-10-CM

## 2023-01-16 DIAGNOSIS — G30.9 ALZHEIMER'S DISEASE, UNSPECIFIED (CODE) (HCC): ICD-10-CM

## 2023-01-16 PROCEDURE — 99214 OFFICE O/P EST MOD 30 MIN: CPT | Performed by: PHYSICIAN ASSISTANT

## 2023-01-16 RX ORDER — PREDNISONE 10 MG/1
20 TABLET ORAL DAILY
Qty: 10 TABLET | Refills: 0 | Status: SHIPPED | OUTPATIENT
Start: 2023-01-16 | End: 2023-01-21

## 2023-01-16 ASSESSMENT — ENCOUNTER SYMPTOMS
DIAPHORESIS: 0
EYE REDNESS: 0
CHILLS: 0
COUGH: 1
ABDOMINAL PAIN: 0
EYE DISCHARGE: 0
DIARRHEA: 0
EYE PAIN: 0
SORE THROAT: 1
WHEEZING: 1
FEVER: 0
SINUS PAIN: 0
SHORTNESS OF BREATH: 0
CONSTIPATION: 0
VOMITING: 0
HEADACHES: 1
NAUSEA: 0
DIZZINESS: 0

## 2023-01-16 ASSESSMENT — FIBROSIS 4 INDEX: FIB4 SCORE: 2.81

## 2023-01-16 NOTE — PROGRESS NOTES
Subjective:     Mello Isabel  is a 77 y.o. male who presents for Cough (X 5 days cough, sore throat, headache)       He presents today for cough x5 days.  This could of is worsened at night when he is sleeping.  Associated sore throat and headache.  It was noted that he recently traveled to [place] and his symptoms began while he was at that location.  Does have a history of asthma and has been experiencing some increased wheezing and shortness of breath since symptom onset.  Has been taking his asthma inhalers as prescribed.  No chest pain, no nausea or vomiting, no abdominal pain, no diarrhea.  No fever/chills/sweats.     Review of Systems   Constitutional:  Negative for chills, diaphoresis, fever and malaise/fatigue.   HENT:  Positive for sore throat. Negative for congestion, ear discharge and sinus pain.    Eyes:  Negative for pain, discharge and redness.   Respiratory:  Positive for cough and wheezing. Negative for shortness of breath.    Cardiovascular:  Negative for chest pain.   Gastrointestinal:  Negative for abdominal pain, constipation, diarrhea, nausea and vomiting.   Genitourinary:  Negative for dysuria, frequency and urgency.   Neurological:  Positive for headaches. Negative for dizziness.    No Known Allergies  Past Medical History:   Diagnosis Date    Allergy to pollen     Asthma, mild persistent     DM (diabetes mellitus) type II controlled peripheral vascular disorder     Essential hypertension     Hyperlipidemia LDL goal <70     TIA (transient ischemic attack) 2012    x2        Objective:   /68 (BP Location: Left arm, Patient Position: Sitting, BP Cuff Size: Adult)   Pulse (!) 57   Temp 36.7 °C (98 °F) (Temporal)   Resp 18   Ht 1.829 m (6')   Wt 86.2 kg (190 lb)   SpO2 96%   BMI 25.77 kg/m²   Physical Exam  Vitals and nursing note reviewed.   Constitutional:       General: He is not in acute distress.     Appearance: Normal appearance. He is not ill-appearing,  toxic-appearing or diaphoretic.   HENT:      Head: Normocephalic.      Right Ear: Tympanic membrane, ear canal and external ear normal. There is no impacted cerumen.      Left Ear: Tympanic membrane, ear canal and external ear normal. There is no impacted cerumen.      Nose: No congestion or rhinorrhea.      Mouth/Throat:      Mouth: Mucous membranes are moist.      Pharynx: No oropharyngeal exudate or posterior oropharyngeal erythema.   Eyes:      General:         Right eye: No discharge.         Left eye: No discharge.      Conjunctiva/sclera: Conjunctivae normal.   Cardiovascular:      Rate and Rhythm: Normal rate and regular rhythm.   Pulmonary:      Effort: Pulmonary effort is normal. No respiratory distress.      Breath sounds: No stridor. Wheezing present. No rhonchi.   Musculoskeletal:      Cervical back: Neck supple.   Lymphadenopathy:      Cervical: No cervical adenopathy.   Neurological:      General: No focal deficit present.      Mental Status: He is alert and oriented to person, place, and time.   Psychiatric:         Mood and Affect: Mood normal.         Behavior: Behavior normal.         Thought Content: Thought content normal.         Judgment: Judgment normal.           Diagnostic testing: None    Assessment/Plan:     Encounter Diagnoses   Name Primary?    Mild persistent asthma with exacerbation         Plan for care for today's complaint includes prednisone 20 mg daily for acute asthma exacerbation, continue previously prescribed inhalers as directed for additional symptom support.  Vital signs are stable and patient is well-appearing, no evidence for antibiotic use at this time as symptoms likely viral in nature.  Continue additional over-the-counter medications for headache and sore throat.  Continue to monitor symptoms and return to urgent care or follow-up with primary care provider if symptoms remain ongoing.  Follow-up in the emergency department if symptoms become severe, ER precautions  discussed in office today..  Prescription for prednisone provided.    See AVS Instructions below for written guidance provided to patient on after-visit management and care in addition to our verbal discussion during the visit.    Please note that this dictation was created using voice recognition software. I have attempted to correct all errors, but there may be sound-alike, spelling, grammar and possibly content errors that I did not discover before finalizing the note.    Alf Moore PA-C

## 2023-01-18 ENCOUNTER — HOSPITAL ENCOUNTER (OUTPATIENT)
Dept: RADIOLOGY | Facility: MEDICAL CENTER | Age: 78
End: 2023-01-18
Attending: PSYCHIATRY & NEUROLOGY
Payer: MEDICARE

## 2023-01-18 ENCOUNTER — HOSPITAL ENCOUNTER (OUTPATIENT)
Dept: LAB | Facility: MEDICAL CENTER | Age: 78
End: 2023-01-18
Attending: PSYCHIATRY & NEUROLOGY
Payer: MEDICARE

## 2023-01-18 DIAGNOSIS — G94 OTHER DISORDERS OF BRAIN IN DISEASES CLASSIFIED ELSEWHERE: ICD-10-CM

## 2023-01-18 DIAGNOSIS — F03.A0 MILD NEURODEGENERATIVE DEMENTIA (HCC): ICD-10-CM

## 2023-01-18 DIAGNOSIS — G30.9 ALZHEIMER'S DISEASE, UNSPECIFIED (CODE) (HCC): ICD-10-CM

## 2023-01-18 PROCEDURE — 82607 VITAMIN B-12: CPT

## 2023-01-18 PROCEDURE — 70551 MRI BRAIN STEM W/O DYE: CPT

## 2023-01-18 PROCEDURE — 82746 ASSAY OF FOLIC ACID SERUM: CPT

## 2023-01-18 PROCEDURE — 84425 ASSAY OF VITAMIN B-1: CPT

## 2023-01-19 LAB
FOLATE SERPL-MCNC: 16.1 NG/ML
VIT B12 SERPL-MCNC: 984 PG/ML (ref 211–911)

## 2023-01-24 LAB — VIT B1 BLD-MCNC: 114 NMOL/L (ref 70–180)

## 2023-01-31 ENCOUNTER — HOSPITAL ENCOUNTER (OUTPATIENT)
Dept: RADIOLOGY | Facility: MEDICAL CENTER | Age: 78
End: 2023-01-31
Attending: PHYSICIAN ASSISTANT
Payer: MEDICARE

## 2023-01-31 DIAGNOSIS — N50.3 CYST OF EPIDIDYMIS: ICD-10-CM

## 2023-01-31 PROCEDURE — 76870 US EXAM SCROTUM: CPT

## 2023-02-13 ENCOUNTER — OFFICE VISIT (OUTPATIENT)
Dept: NEUROLOGY | Facility: MEDICAL CENTER | Age: 78
End: 2023-02-13
Attending: STUDENT IN AN ORGANIZED HEALTH CARE EDUCATION/TRAINING PROGRAM
Payer: MEDICARE

## 2023-02-13 VITALS
DIASTOLIC BLOOD PRESSURE: 70 MMHG | OXYGEN SATURATION: 98 % | HEART RATE: 55 BPM | RESPIRATION RATE: 14 BRPM | HEIGHT: 72 IN | BODY MASS INDEX: 26.64 KG/M2 | SYSTOLIC BLOOD PRESSURE: 138 MMHG | WEIGHT: 196.65 LBS

## 2023-02-13 DIAGNOSIS — G40.209 PARTIAL SYMPTOMATIC EPILEPSY WITH COMPLEX PARTIAL SEIZURES, NOT INTRACTABLE, WITHOUT STATUS EPILEPTICUS (HCC): ICD-10-CM

## 2023-02-13 PROBLEM — N50.3 CYST OF EPIDIDYMIS: Status: ACTIVE | Noted: 2023-02-03

## 2023-02-13 PROCEDURE — 99215 OFFICE O/P EST HI 40 MIN: CPT | Performed by: PSYCHIATRY & NEUROLOGY

## 2023-02-13 PROCEDURE — 99212 OFFICE O/P EST SF 10 MIN: CPT | Performed by: PSYCHIATRY & NEUROLOGY

## 2023-02-13 RX ORDER — AMLODIPINE BESYLATE 5 MG/1
TABLET ORAL
COMMUNITY
End: 2023-03-17

## 2023-02-13 RX ORDER — LISINOPRIL 40 MG/1
TABLET ORAL
COMMUNITY
End: 2023-06-09 | Stop reason: SDUPTHER

## 2023-02-13 RX ORDER — LEVETIRACETAM 500 MG/1
TABLET ORAL
Qty: 200 TABLET | Refills: 6 | Status: SHIPPED | OUTPATIENT
Start: 2023-02-13 | End: 2023-07-12 | Stop reason: SINTOL

## 2023-02-13 RX ORDER — FLUOXETINE HYDROCHLORIDE 20 MG/1
CAPSULE ORAL
COMMUNITY
End: 2023-04-05

## 2023-02-13 ASSESSMENT — PATIENT HEALTH QUESTIONNAIRE - PHQ9
CLINICAL INTERPRETATION OF PHQ2 SCORE: 1
5. POOR APPETITE OR OVEREATING: 3 - NEARLY EVERY DAY
SUM OF ALL RESPONSES TO PHQ QUESTIONS 1-9: 11

## 2023-02-13 ASSESSMENT — FIBROSIS 4 INDEX: FIB4 SCORE: 2.81

## 2023-02-13 NOTE — PROGRESS NOTES
"Neuro follow up:    Last visit 11/10/2022.    Mello Isabel 77 y.o. right handed gentleman who is from [facility] and then moved to [place] and then [facility] about 7 years ago.  He was a . He is retired.     Problem List reviewed     He is here with her his wife and daughter.     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 He is aware of this memory disturbance(s).    Since the last visit with me in 11/2022  Mello has had these episodes- the 1st episode was in September or October 2022 and the episodes have continued> January 10th, 19, 28th, 29th and the last one was this past Friday.  The events start with his eyes staring straight or down at the ground but stops moving and does not communicate and once she sees him in one- she will says \"are you ok\" and he will not respond.  This last no more than 1 minute.  There are no clear eye movements or head version during the episodes. He seems confused after the event as if nothing happened (and he in retrospect does have any recollection of the event).There is no slurred speech.       He is able to read and just finished a book (read every day- mystery books).        He has started to comment about where he is located when being a passenger in a car - in the last 6-12 moths or so.     No falls or near falls in the last 6 months.     No seizure type events known,reported to me today or in problem list in the last several years.     He has repeat information and often  will ask his wife something and repeat information within 30 minutes and often repeating himself 2 to 4 days.     1 to 2 years ago, he stopped at a red light and then within seconds after stopping and then after a few seconds he thought the light was green and immediately starting to drive.    No progressive gait-balance decline or " involuntary movements of the limbs in the last 3-6 months.    Wife has noticed that he will have difficulty hanging clothes up over the last 12 months and sometimes putting his jacket on backwards or upside side.     Heavy smoker- from age 20 (1-2 packs per day) and quit in early 90(s).  Drank- drank low level for over 30 years> beer and wife and has stopped drinking since 1991        Mother's Sister:  Alzheimer's type Dementia (late in life).        Patient Active Problem List    Diagnosis Date Noted    Moderate aortic insufficiency 06/21/2022    Dementia without behavioral disturbance (HCC) 06/21/2022    Anxiety and depression 06/19/2022    Complex tear of medial meniscus of right knee as current injury 02/01/2022    Pain in the chest 10/27/2021    Dizziness 10/27/2021    Moderate asthma 10/27/2021    Leukocytosis 10/27/2021    Lumbar spondylosis 06/23/2021    Other intervertebral disc degeneration, lumbar region 06/23/2021    Myofascial pain syndrome of lumbar spine 06/23/2021    Chronic right-sided low back pain with right-sided sciatica 06/23/2021    Allergy to pollen     Asthma, mild persistent     DM (diabetes mellitus) type II controlled peripheral vascular disorder     TIA (transient ischemic attack)     Essential hypertension     Hyperlipidemia LDL goal <70        Past medical history:   Past Medical History:   Diagnosis Date    Allergy to pollen     Asthma, mild persistent     DM (diabetes mellitus) type II controlled peripheral vascular disorder     Essential hypertension     Hyperlipidemia LDL goal <70     TIA (transient ischemic attack) 2012    x2       Past surgical history:   Past Surgical History:   Procedure Laterality Date    PB KNEE SCOPE,MED/LAT MENISECTOMY Right 2/25/2022    Procedure: RIGHT KNEE ARTHROSCOPY PARTIAL MEDIAL MENISCECTOMY, POSSIBLE CHONDROPLASTY, REPAIRS AS INDICATED;  Surgeon: Tremaine Sarkar M.D.;  Location: [facility];  Service: Orthopedics    CATARACT  PHACO WITH IOL      gilbert eyes    KNEE ARTHROSCOPY      x2 left    RETINAL DETACHMENT REPAIR      gilbert eyes         Social history:   Social History     Socioeconomic History    Marital status:      Spouse name: Not on file    Number of children: Not on file    Years of education: Not on file    Highest education level: Not on file   Occupational History    Occupation:      Comment: self employed,   Tobacco Use    Smoking status: Former     Types: Cigarettes     Quit date:      Years since quittin.1    Smokeless tobacco: Former     Types: Chew     Quit date:    Vaping Use    Vaping Use: Never used   Substance and Sexual Activity    Alcohol use: No     Alcohol/week: 0.0 oz    Drug use: No    Sexual activity: Not on file   Other Topics Concern    Not on file   Social History Narrative    Not on file     Social Determinants of Health     Financial Resource Strain: Low Risk     Difficulty of Paying Living Expenses: Not very hard   Food Insecurity: No Food Insecurity    Worried About Running Out of Food in the Last Year: Never true    Ran Out of Food in the Last Year: Never true   Transportation Needs: No Transportation Needs    Lack of Transportation (Medical): No    Lack of Transportation (Non-Medical): No   Physical Activity: Insufficiently Active    Days of Exercise per Week: 3 days    Minutes of Exercise per Session: 20 min   Stress: Stress Concern Present    Feeling of Stress : Rather much   Social Connections: Moderately Integrated    Frequency of Communication with Friends and Family: More than three times a week    Frequency of Social Gatherings with Friends and Family: More than three times a week    Attends Spiritism Services: Never    Active Member of Clubs or Organizations: Yes    Attends Club or Organization Meetings: 1 to 4 times per year    Marital Status:    Intimate Partner Violence: Not At Risk    Fear of Current or Ex-Partner: No    Emotionally Abused: No     Physically Abused: No    Sexually Abused: No   Housing Stability: Unknown    Unable to Pay for Housing in the Last Year: No    Number of Places Lived in the Last Year: Not on file    Unstable Housing in the Last Year: No       Family history:   Family History   Problem Relation Age of Onset    Cancer Mother         liver    Stroke Father     Hypertension Brother     Hyperlipidemia Brother     Diabetes Brother     Hypertension Brother     Hyperlipidemia Brother          Current medications:   Current Outpatient Medications   Medication    sertraline (ZOLOFT) 25 MG tablet    lisinopril (PRINIVIL) 20 MG Tab    ALBUTEROL SULFATE HFA INH    Ascorbic Acid (VITAMIN C) 1000 MG Tab    Cholecalciferol (VITAMIN D3) 125 MCG (5000 UT) Cap    ipratropium-albuterol (COMBIVENT RESPIMAT)  MCG/ACT Aero Soln    aspirin EC (ECOTRIN) 81 MG Tablet Delayed Response    Multiple Vitamin (MULTIVITAMIN PO)    atorvastatin (LIPITOR) 80 MG tablet     No current facility-administered medications for this visit.       Medication Allergy:  No Known Allergies        Physical examination:   Vitals:    02/13/23 1110   BP: 138/70   BP Location: Right arm   Patient Position: Sitting   BP Cuff Size: Large adult   Pulse: (!) 55   Resp: 14   SpO2: 98%   Weight: 89.2 kg (196 lb 10.4 oz)   Height: 1.829 m (6')       Normal cephalic atraumatic.  There is full range of movement around the neck in all directions without restrictions or discrete pain evoked triggers.  No lower extremity edema.      Neurological  Exam:      [place] Cognitive Assessment (MOCA) Version 7.1      Mental status: Awake, alert and fully oriented to person, place, and situation. Normal attention, concentration, and fund of knowledge for education level.  Did not appear/act combative,irritable,anxious,paranoid/delusional or aggressive to or with me.    Speech and language: Speech is fluent without errors, clear, and intact to repetition.     Follows 3 step motor commands in  sequence without significant delay and correctly.    Cranial nerve exam:  II: Pupils are equally round and reactive to light. Visual fields are intact by confrontation.  III, IV, VI: EOMI, no diplopia, no ptosis.  V: Sensation to light touch is normal over V1-3 distributions bilaterally.  .  VII: Facial movements are symmetrical. There is no facial droop. .  VIII: Hearing intact to soft speech and finger rub bilaterally  IX: Palate elevates symmetrically, uvula is midline. Dysarthria is not present.  XI: Shoulder shrug are symmetrical and strong.   XII: Tongue protrudes midline.      Motor exam:  Muscle tone is normal in all 4 limbs. and No abnormal movements appreciated.    Muscle strength:    Neck Flexors/Extensors: 5/5       Right  Left  Deltoid   5/5  5/5      Biceps   5/5  5/5  Triceps              5/5  5/5   Wrist extensors 5/5  5/5  Wrist flexors  5/5  5/5     5/5  5/5  Interossei  5/5  5/5  Thenar (APB)  5/5  5/5   Hip flexors  5/5  5/5  Quadriceps  5/5  5/5    Hamstrings  5/5  5/5  Dorsiflexors  5/5  5/5  Plantarflexors  5/5  5/5  Toe extension  5/5  5/5    Frontal release signs are absent    bilaterally toes are downgoing to plantar stimulation..    Coordination (finger-to-nose, heel/knee/shin, rapid alternating movements) was normal.     There was no ataxia, no tremors, and no dysmetria.     Station and gait were normal. Easily stands up from exam chair without retropulsion,veering,leaning,swaying (to either side).       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      Mello to me does not have good insight into this  disturbances.    There has been mild degrees of periodic agitation without akanksha behavioral,personality changes or  akanksha psychosis since last visit with me.    2.  Partial Complex Seizure(s)- with stereotyped events noticed by wife over the last 6 months.    Risk factors include right temporal lobe encephalomalacia area and/or suspect Neurodegenerative process.    Plans    A. Brain PET to be done in Feb 2023- already ordered.    B. Start Keppra 500 mg PO BID (1000 mg a day) to start and check Leveteracetam blood level in 7-10 days.  Side effects reviewed.    C. Reviewed use of cognitive enhancers today with family and Mello but will hold off at this time given side effects.    D. 48 EEG monitoring session to be obtained.      I have performed  a history and physical exam and a directed /focused  ROS today.    Total time spent today or this patient's care was 42 minutes and included reviewing  the diagnostic workup to date (such as labs and imaging as well as interpreting such tests relevant to this patient's neurological condition),  reviewing/obtaining separately obtained history (from patient and/or accompanying ffamily member)  for today's neurological problem(s) ,counseling and educating the patient and family member on issues related to cognition/memory and cognitive health factors and documenting  the clinical information in the EMR.    Follow up by email or in a few months.        Sandoval Cruz MD  [facility].   [facility]

## 2023-02-14 ENCOUNTER — HOSPITAL ENCOUNTER (OUTPATIENT)
Dept: RADIOLOGY | Facility: MEDICAL CENTER | Age: 78
End: 2023-02-14
Payer: MEDICARE

## 2023-02-21 ENCOUNTER — NON-PROVIDER VISIT (OUTPATIENT)
Dept: NEUROLOGY | Facility: MEDICAL CENTER | Age: 78
End: 2023-02-21
Attending: PSYCHIATRY & NEUROLOGY
Payer: MEDICARE

## 2023-02-21 DIAGNOSIS — G40.209 PARTIAL SYMPTOMATIC EPILEPSY WITH COMPLEX PARTIAL SEIZURES, NOT INTRACTABLE, WITHOUT STATUS EPILEPTICUS (HCC): ICD-10-CM

## 2023-02-21 PROCEDURE — 95719 EEG PHYS/QHP EA INCR W/O VID: CPT | Performed by: STUDENT IN AN ORGANIZED HEALTH CARE EDUCATION/TRAINING PROGRAM

## 2023-02-21 PROCEDURE — 95700 EEG CONT REC W/VID EEG TECH: CPT | Performed by: STUDENT IN AN ORGANIZED HEALTH CARE EDUCATION/TRAINING PROGRAM

## 2023-02-21 PROCEDURE — 95708 EEG WO VID EA 12-26HR UNMNTR: CPT | Performed by: STUDENT IN AN ORGANIZED HEALTH CARE EDUCATION/TRAINING PROGRAM

## 2023-02-21 NOTE — PROCEDURES
AMBULATORY ELECTROENCEPHALOGRAM REPORT      REFERRING PROVIDER:  Sandoval Cruz M.D.  [address] [place] [address]  DOS: 02/21/2023   DURATION OF RECORDING: (22 hours and 50 minutes)    INDICATION:  Mello Isabel 77 y.o. male presenting with   behavioral arrest, unresponsiveness, and altered mental status    CURRENT OUTPATIENT MEDICATION LIST:   Current Outpatient Medications   Medication Instructions    ALBUTEROL SULFATE HFA INH 2 Inhalation, Inhalation, 3 TIMES DAILY PRN    amLODIPine (NORVASC) 5 MG Tab amlodipine 5 mg tablet   TAKE 2 TABLETS BY MOUTH EVERY DAY    Ascorbic Acid (VITAMIN C) 1000 MG Tab Oral, DAILY    aspirin EC (ECOTRIN) 81 mg, Oral, EVERY BEDTIME    atorvastatin (LIPITOR) 80 MG tablet TAKE 1 TABLET BY MOUTH EVERY DAY    Cholecalciferol (VITAMIN D3) 125 MCG (5000 UT) Cap 1 Capsule, Oral, DAILY    FLUoxetine (PROZAC) 20 MG Cap fluoxetine 20 mg capsule   TAKE 1 CAPSULE BY MOUTH DAILY    ipratropium-albuterol (COMBIVENT RESPIMAT)  MCG/ACT Aero Soln 1 Puff, Inhalation, 1 TIME DAILY PRN    levETIRAcetam (KEPPRA) 500 MG Tab Take 500 mg PO BID (1000 mg)- 12 hours apart    lisinopril (PRINIVIL) 40 MG tablet lisinopril 40 mg tablet   TAKE 1 TABLET BY MOUTH DAILY    lisinopril (PRINIVIL) 40 mg, Oral, DAILY    Multiple Vitamin (MULTIVITAMIN PO) 1 Tablet, Oral, DAILY    sertraline (ZOLOFT) 25 mg, Oral, DAILY       TECHNIQUE:   The EEG was set up and taken down by a Neurodiagnostic technologist who performed education to patient and staff.  A minimum but not limited to 23 electrodes and 23 channel recording was setup and performed by Neurodiagnostic technologist. Impedances, electrode integrity, and technical impressions were documented a minimum of every 2-24 hour period by a Neurodiagnostic Technologist and reviewed by Interpreting Physician.    DESCRIPTION OF THE RECORD:  During maximal wakefulness, the background was continuous and showed a 8-9 Hz posterior dominant rhythm, with  "a background composed of mixed alpha and excess theta frequencies, rare delta.  Reactivity and state changes were present.  During drowsiness, theta/delta frequencies were seen.    Sleep was captured and was characterized by diffuse background delta/theta activity with a loss of myogenic artifact.  N2 sleep transients in the form of sleep spindles and vertex waves were seen in the leads over the central regions.     ICTAL AND INTERICTAL FINDINGS:   No focal or generalized epileptiform activity noted.     No regional slowing or persistent focal asymmetries were seen.    No seizures.    ACTIVATION PROCEDURES:   Intermittent Photic stimulation was performed in a stepwise fashion from 1 to 30 Hz and did not elicited any abnormalities on EEG.     EKG: Sampling of the EKG recording showed intermittent PACs and/or PVCs    EVENTS:  Push button events and/or ambulatory diary events: one event of feeling \"dizzy\" at 4:53 PM with no EEG or EKG correlate.    INTERPRETATION:  Abnormal ambulatory EEG recording in the awake and drowsy/sleep state(s):  -Mild intermittent background slowing suggestive of a nonspecific encephalopathy. Clinical correlation recommended.  -No regional slowing or persistent focal asymmetries were seen.  -No definitive epileptiform discharges were seen.  -No seizures.   -Clinical Events: one event of feeling \"dizzy\" at 4:53 PM with no EEG or EKG correlate.  -If there is ongoing suspicion for seizures, consider inpatient long-term video EEG monitoring in the Epilepsy Monitoring Unit (EMU).         Chase Martel MD  Department of Neurology at [facility]  General Neurologist and Epileptologist  Director of [facility]'s Level III Comprehensive Epilepsy Program  Professor of Clinical Neurology, [facility].   Phone: [phone]  Fax: [phone]  E-mail: [email or web address]      "

## 2023-02-22 ENCOUNTER — NON-PROVIDER VISIT (OUTPATIENT)
Dept: NEUROLOGY | Facility: MEDICAL CENTER | Age: 78
End: 2023-02-22
Attending: STUDENT IN AN ORGANIZED HEALTH CARE EDUCATION/TRAINING PROGRAM
Payer: MEDICARE

## 2023-02-22 DIAGNOSIS — G40.209 PARTIAL SYMPTOMATIC EPILEPSY WITH COMPLEX PARTIAL SEIZURES, NOT INTRACTABLE, WITHOUT STATUS EPILEPTICUS (HCC): ICD-10-CM

## 2023-02-22 PROCEDURE — 95708 EEG WO VID EA 12-26HR UNMNTR: CPT | Performed by: STUDENT IN AN ORGANIZED HEALTH CARE EDUCATION/TRAINING PROGRAM

## 2023-02-22 PROCEDURE — 95719 EEG PHYS/QHP EA INCR W/O VID: CPT | Performed by: STUDENT IN AN ORGANIZED HEALTH CARE EDUCATION/TRAINING PROGRAM

## 2023-02-22 NOTE — PROCEDURES
AMBULATORY ELECTROENCEPHALOGRAM REPORT      REFERRING PROVIDER:  Sandoval Cruz M.D.  [address] [place] [address]  DOS: 02/22/2023   DURATION OF RECORDING: (23 hours and 33 minutes)    INDICATION:  Mello Isabel 77 y.o. male presenting with   behavioral arrest, unresponsiveness, and altered mental status    CURRENT OUTPATIENT MEDICATION LIST:   Current Outpatient Medications   Medication Instructions    ALBUTEROL SULFATE HFA INH 2 Inhalation, Inhalation, 3 TIMES DAILY PRN    amLODIPine (NORVASC) 5 MG Tab amlodipine 5 mg tablet   TAKE 2 TABLETS BY MOUTH EVERY DAY    Ascorbic Acid (VITAMIN C) 1000 MG Tab Oral, DAILY    aspirin EC (ECOTRIN) 81 mg, Oral, EVERY BEDTIME    atorvastatin (LIPITOR) 80 MG tablet TAKE 1 TABLET BY MOUTH EVERY DAY    Cholecalciferol (VITAMIN D3) 125 MCG (5000 UT) Cap 1 Capsule, Oral, DAILY    FLUoxetine (PROZAC) 20 MG Cap fluoxetine 20 mg capsule   TAKE 1 CAPSULE BY MOUTH DAILY    ipratropium-albuterol (COMBIVENT RESPIMAT)  MCG/ACT Aero Soln 1 Puff, Inhalation, 1 TIME DAILY PRN    levETIRAcetam (KEPPRA) 500 MG Tab Take 500 mg PO BID (1000 mg)- 12 hours apart    lisinopril (PRINIVIL) 40 MG tablet lisinopril 40 mg tablet   TAKE 1 TABLET BY MOUTH DAILY    lisinopril (PRINIVIL) 40 mg, Oral, DAILY    Multiple Vitamin (MULTIVITAMIN PO) 1 Tablet, Oral, DAILY    sertraline (ZOLOFT) 25 mg, Oral, DAILY       TECHNIQUE:   The EEG was set up and taken down by a Neurodiagnostic technologist who performed education to patient and staff.  A minimum but not limited to 23 electrodes and 23 channel recording was setup and performed by Neurodiagnostic technologist. Impedances, electrode integrity, and technical impressions were documented a minimum of every 2-24 hour period by a Neurodiagnostic Technologist and reviewed by Interpreting Physician.    DESCRIPTION OF THE RECORD:  During maximal wakefulness, the background was continuous and showed a 8-9 Hz posterior dominant rhythm, with  a background composed of mixed alpha and excess theta frequencies, rare delta.  Reactivity and state changes were present.  During drowsiness, theta/delta frequencies were seen.    Sleep was captured and was characterized by diffuse background delta/theta activity with a loss of myogenic artifact.  N2 sleep transients in the form of sleep spindles and vertex waves were seen in the leads over the central regions.     ICTAL AND INTERICTAL FINDINGS:   No focal or generalized epileptiform activity noted.     No regional slowing or persistent focal asymmetries were seen.    No seizures.    ACTIVATION PROCEDURES:   NA    EKG: Sampling of the EKG recording showed intermittent PACs and/or PVCs    EVENTS:  None    INTERPRETATION:  Abnormal ambulatory EEG recording in the awake and drowsy/sleep state(s):  -Mild intermittent background slowing suggestive of a nonspecific encephalopathy. Clinical correlation recommended.  -No regional slowing or persistent focal asymmetries were seen.  -No definitive epileptiform discharges were seen.  -No seizures.   -Clinical Events: None  -Compared to yesterday's study, no major differences were seen  -If there is ongoing suspicion for seizures, consider inpatient long-term video EEG monitoring in the Epilepsy Monitoring Unit (EMU).         Chase Martel MD  Department of Neurology at [facility]  General Neurologist and Epileptologist  Director of [facility]'s Level III Comprehensive Epilepsy Program  Professor of Clinical Neurology, [facility].   Phone: [phone]  Fax: [phone]  E-mail: [email or web address]

## 2023-02-23 ENCOUNTER — NON-PROVIDER VISIT (OUTPATIENT)
Dept: NEUROLOGY | Facility: MEDICAL CENTER | Age: 78
End: 2023-02-23
Attending: STUDENT IN AN ORGANIZED HEALTH CARE EDUCATION/TRAINING PROGRAM
Payer: MEDICARE

## 2023-02-23 ENCOUNTER — HOSPITAL ENCOUNTER (OUTPATIENT)
Dept: LAB | Facility: MEDICAL CENTER | Age: 78
End: 2023-02-23
Attending: PSYCHIATRY & NEUROLOGY
Payer: MEDICARE

## 2023-02-23 DIAGNOSIS — G40.209 PARTIAL SYMPTOMATIC EPILEPSY WITH COMPLEX PARTIAL SEIZURES, NOT INTRACTABLE, WITHOUT STATUS EPILEPTICUS (HCC): ICD-10-CM

## 2023-02-23 PROCEDURE — 36415 COLL VENOUS BLD VENIPUNCTURE: CPT

## 2023-02-23 PROCEDURE — 99999 PR NO CHARGE: CPT | Performed by: STUDENT IN AN ORGANIZED HEALTH CARE EDUCATION/TRAINING PROGRAM

## 2023-02-23 PROCEDURE — 80177 DRUG SCRN QUAN LEVETIRACETAM: CPT

## 2023-02-25 LAB — LEVETIRACETAM SERPL-MCNC: 18 UG/ML (ref 10–40)

## 2023-02-28 ENCOUNTER — HOSPITAL ENCOUNTER (OUTPATIENT)
Dept: RADIOLOGY | Facility: MEDICAL CENTER | Age: 78
End: 2023-02-28
Attending: PSYCHIATRY & NEUROLOGY
Payer: MEDICARE

## 2023-03-13 ENCOUNTER — HOSPITAL ENCOUNTER (OUTPATIENT)
Dept: RADIOLOGY | Facility: MEDICAL CENTER | Age: 78
End: 2023-03-13
Attending: PSYCHIATRY & NEUROLOGY
Payer: MEDICARE

## 2023-03-13 PROCEDURE — A9552 F18 FDG: HCPCS

## 2023-03-17 ENCOUNTER — OFFICE VISIT (OUTPATIENT)
Dept: NEUROLOGY | Facility: MEDICAL CENTER | Age: 78
End: 2023-03-17
Attending: PSYCHIATRY & NEUROLOGY
Payer: MEDICARE

## 2023-03-17 VITALS
BODY MASS INDEX: 26.88 KG/M2 | DIASTOLIC BLOOD PRESSURE: 70 MMHG | OXYGEN SATURATION: 96 % | HEART RATE: 60 BPM | WEIGHT: 198.19 LBS | SYSTOLIC BLOOD PRESSURE: 126 MMHG | TEMPERATURE: 96.9 F

## 2023-03-17 DIAGNOSIS — G40.009 PARTIAL IDIOPATHIC EPILEPSY WITH SEIZURES OF LOCALIZED ONSET, NOT INTRACTABLE, WITHOUT STATUS EPILEPTICUS (HCC): ICD-10-CM

## 2023-03-17 PROCEDURE — 99214 OFFICE O/P EST MOD 30 MIN: CPT | Performed by: PSYCHIATRY & NEUROLOGY

## 2023-03-17 PROCEDURE — 99212 OFFICE O/P EST SF 10 MIN: CPT | Performed by: PSYCHIATRY & NEUROLOGY

## 2023-03-17 ASSESSMENT — FIBROSIS 4 INDEX: FIB4 SCORE: 2.81

## 2023-03-17 NOTE — PROGRESS NOTES
"Neuro follow up:     Last visit by me in 2/2023.     Mello Isabel 77 y.o. right handed gentleman who is from [facility] and then moved to [place] and then [facility] about 7 years ago.  He was a . He is retired.     Problem List reviewed     He is here with her his wife and daughter.     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 He is aware of this memory disturbance(s).     Since the last visit with me in 11/2022  eMllo has had these episodes- the 1st episode was in September or October 2022 and the episodes have continued> January 10th, 19, 28th, 29th and the last one was this past Friday.  The events start with his eyes staring straight or down at the ground but stops moving and does not communicate and once she sees him in one- she will says \"are you ok\" and he will not respond.  This last no more than 1 minute.  There are no clear eye movements or head version during the episodes. He seems confused after the event as if nothing happened (and he in retrospect does have any recollection of the event).There is no slurred speech.        He is able to read and just finished a book (read every day- mystery books).      He has started to comment about where he is located when being a passenger in a car - in the last 6-12 moths or so.     No falls or near falls in last several months.     No seizure type events known,reported to me today or in problem list in the last several years.     He has repeat information and often  will ask his wife something and repeat information within 30 minutes and often repeating himself 2 to 4 days.     1 to 2 years ago, he stopped at a red light and then within seconds after stopping and then after a few seconds he thought the light was green and immediately starting to drive.    He has been taking Keppra 500 mg (8:30 " "am and 8:30 pm) and no further episodes noticed per wife since last visit.  About 1 hour after taking his Keppra he will get dizzy and when standing up he can't catch his bearing(s)- afternoon are not much. Wife has noticed that he seems to sleep more since being on the Keppra medication.    No progressive gait-balance decline or involuntary movements of the limbs in the last 3-6 months.    Wife has noticed that he will have difficulty hanging clothes up over the last 12 months and sometimes putting his jacket on backwards or upside side.     Heavy smoker- from age 20 (1-2 packs per day) and quit in early 90(s).  Drank- drank low level for over 30 years> beer and wife and has stopped drinking since 1991        Mother's Sister:  Alzheimer's type Dementia (late in life).    Recent Lab and electric testing:    Brain PET 1/19/2023:    IMPRESSION:        1. Abnormal FDG uptake in the brain, however pattern is not characteristic of a particular neurodegenerative disease.           Exam Ended: 03/13/23  3:00 PM Last Resulted: 03/13/23  4:27 PM             EEG testing Ambulatory:  2/21 and 2/22/2023:    ACTIVATION PROCEDURES:   Intermittent Photic stimulation was performed in a stepwise fashion from 1 to 30 Hz and did not elicited any abnormalities on EEG.      EKG: Sampling of the EKG recording showed intermittent PACs and/or PVCs     EVENTS:  Push button events and/or ambulatory diary events: one event of feeling \"dizzy\" at 4:53 PM with no EEG or EKG correlate.     INTERPRETATION:  Abnormal ambulatory EEG recording in the awake and drowsy/sleep state(s):  -Mild intermittent background slowing suggestive of a nonspecific encephalopathy. Clinical correlation recommended.  -No regional slowing or persistent focal asymmetries were seen.  -No definitive epileptiform discharges were seen.  -No seizures.   -Clinical Events: one event of feeling \"dizzy\" at 4:53 PM with no EEG or EKG correlate.  -If there is ongoing suspicion for " seizures, consider inpatient long-term video EEG monitoring in the Epilepsy Monitoring Unit (EMU).      DESCRIPTION OF THE RECORD:  During maximal wakefulness, the background was continuous and showed a 8-9 Hz posterior dominant rhythm, with a background composed of mixed alpha and excess theta frequencies, rare delta.  Reactivity and state changes were present.  During drowsiness, theta/delta frequencies were seen.     Sleep was captured and was characterized by diffuse background delta/theta activity with a loss of myogenic artifact.  N2 sleep transients in the form of sleep spindles and vertex waves were seen in the leads over the central regions.      ICTAL AND INTERICTAL FINDINGS:   No focal or generalized epileptiform activity noted.      No regional slowing or persistent focal asymmetries were seen.     No seizures.     ACTIVATION PROCEDURES:   NA     EKG: Sampling of the EKG recording showed intermittent PACs and/or PVCs     EVENTS:  None     INTERPRETATION:  Abnormal ambulatory EEG recording in the awake and drowsy/sleep state(s):  -Mild intermittent background slowing suggestive of a nonspecific encephalopathy. Clinical correlation recommended.  -No regional slowing or persistent focal asymmetries were seen.  -No definitive epileptiform discharges were seen.  -No seizures.   -Clinical Events: None  -Compared to yesterday's study, no major differences were seen  -If there is ongoing suspicion for seizures, consider inpatient long-term video EEG monitoring in the Epilepsy Monitoring Unit (EMU).            Chase Martel MD  Department of Neurology at [facility]  General Neurologist and Epileptologist  Director of [facility] Level III Comprehensive Epilepsy Program  Professor of Clinical Neurology, [facility].     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      Mello to me does not have good insight into this  disturbances.    There has been mild degrees of periodic agitation without akanksha behavioral,personality changes or akanksha psychosis since last visit with me.     2.  Partial Complex Seizure(s)- with stereotyped events noticed by wife over the last 6 months. Keppra seems to be done well in terms of seizure prevention but having daily side effects which are consistent and include dizziness,drowsiness since being on the 500 mg PO BID (1000 mg a day).       Risk factors include right temporal lobe encephalomalacia area and/or suspect Neurodegenerative process.    Plan:    Exchange Keppra 500 mg PO BID (1000 mg a day) for Briviact 50 mg PO BID (100 mg a day) x 1 week, then reduce dose of Keppra to 250 mg PO BID (500 mg a day) and after week #2 will increase Briviact to 75 mg PO BID (150 mg a day). Max daily dose of 200 mg a day. Dose related side effects reviewed today.        After he is on 75 mg PO BID (150 mg a day) will stop the Keppra completely.       Plans     A. Brain PET to be done in Feb 2023- already ordered.     B. Start Keppra 500 mg PO BID (1000 mg a day) to start and check Leveteracetam blood level in 7-10 days.       C. Reviewed use of cognitive enhancers today with family and Mello but will hold off at this time given side effects.     D.  Tremor (mild)- tends to occur with right hand.    No features of parkinsonism.      I have performed  a history and physical exam and a directed /focused  ROS today.     Total time spent today or this patient's care was 30 minutes and included reviewing  the diagnostic workup to date (such as labs and imaging as well as interpreting such tests relevant to this patient's neurological condition),  reviewing/obtaining separately obtained history from wife and  Mello   for today's neurological problem(s) ,counseling and educating the patient and family member on issues related to cognition/memory and cognitive health factors and documenting  the clinical information in the EMR.     Follow up by email or in a few months.           Sandoval Cruz MD  [facility].   [facility]

## 2023-03-20 ENCOUNTER — TELEPHONE (OUTPATIENT)
Dept: NEUROLOGY | Facility: MEDICAL CENTER | Age: 78
End: 2023-03-20

## 2023-03-20 NOTE — TELEPHONE ENCOUNTER
New  Requesting prior authorization for Briviact 50mg tablet  [place] Outcome approved   Quantity of 60 for a day supply of 30  Insurance [employer]  Reference #? [id number]  Dates in effect, from 03/20/2023 through 03/19/2024  Copay $772.00 FA Free drug available through [employer]

## 2023-04-05 PROBLEM — R51.9 CHRONIC HEADACHE DISORDER: Status: ACTIVE | Noted: 2023-04-05

## 2023-04-05 PROBLEM — F03.90: Chronic | Status: ACTIVE | Noted: 2022-06-21

## 2023-04-05 PROBLEM — G40.209 NONINTRACTABLE EPILEPSY WITH COMPLEX PARTIAL SEIZURES (HCC): Chronic | Status: ACTIVE | Noted: 2023-04-05

## 2023-04-05 PROBLEM — G40.209 NONINTRACTABLE EPILEPSY WITH COMPLEX PARTIAL SEIZURES (HCC): Status: ACTIVE | Noted: 2023-04-05

## 2023-04-05 PROBLEM — G89.29 CHRONIC HEADACHE DISORDER: Status: ACTIVE | Noted: 2023-04-05

## 2023-04-07 PROBLEM — R51.9 CHRONIC HEADACHE DISORDER: Chronic | Status: ACTIVE | Noted: 2023-04-05

## 2023-04-07 PROBLEM — G89.29 CHRONIC HEADACHE DISORDER: Chronic | Status: ACTIVE | Noted: 2023-04-05

## 2023-04-24 ENCOUNTER — DOCUMENTATION (OUTPATIENT)
Dept: NEUROLOGY | Facility: MEDICAL CENTER | Age: 78
End: 2023-04-24
Payer: MEDICARE

## 2023-04-24 ENCOUNTER — TELEPHONE (OUTPATIENT)
Dept: NEUROLOGY | Facility: MEDICAL CENTER | Age: 78
End: 2023-04-24
Payer: MEDICARE

## 2023-04-24 DIAGNOSIS — G40.009 PARTIAL IDIOPATHIC EPILEPSY (HCC): ICD-10-CM

## 2023-04-24 RX ORDER — LEVETIRACETAM 500 MG/1
TABLET ORAL
Qty: 360 TABLET | Refills: 12 | Status: SHIPPED | OUTPATIENT
Start: 2023-04-24 | End: 2023-07-12 | Stop reason: SINTOL

## 2023-04-24 NOTE — PROGRESS NOTES
Called and left message for Rosendo and Zoë today.  No one was in.  I asked them to email me with additional questions.

## 2023-04-24 NOTE — TELEPHONE ENCOUNTER
Pt's wife Zoë Isabel called for her  to talk to someone about the episodes he's been having. Wants a call back at [phone].    I had previously prescribed Briviact given potential concern for Keppra side effects.    We discussed the consideration of stopping the Keppra vs going to the Emergency Room.    We discussed stopping the Keppra and starting  the Briviact.

## 2023-05-02 ENCOUNTER — TELEPHONE (OUTPATIENT)
Dept: NEUROLOGY | Facility: MEDICAL CENTER | Age: 78
End: 2023-05-02
Payer: MEDICARE

## 2023-05-02 NOTE — TELEPHONE ENCOUNTER
[phone]  Zoë pt wife called requesting to speak with dr Cruz regarding pt new medication ( Briviact)  Called back let wife know dr Cruz is out of office this week, will be back next week    She states when took Briviact got same side effects as the old one.  Dizziness, off balance, cannot and weak. Wife stopped meds 4/28/23 and so far he has been fine, no side symptoms. Also no seizures. He is more alert, and functioning much better.   He still gets the horrible headaches on forehead, almost everyday.  Please advise

## 2023-05-03 ENCOUNTER — DOCUMENTATION (OUTPATIENT)
Dept: NEUROLOGY | Facility: MEDICAL CENTER | Age: 78
End: 2023-05-03
Payer: MEDICARE

## 2023-05-03 DIAGNOSIS — G40.009 PARTIAL IDIOPATHIC EPILEPSY WITH SEIZURES OF LOCALIZED ONSET, NOT INTRACTABLE, WITHOUT STATUS EPILEPTICUS (HCC): ICD-10-CM

## 2023-05-03 RX ORDER — LACOSAMIDE 100 MG/1
TABLET ORAL
Qty: 200 TABLET | Refills: 5 | Status: SHIPPED | OUTPATIENT
Start: 2023-05-03 | End: 2023-07-12 | Stop reason: SINTOL

## 2023-05-03 NOTE — PROGRESS NOTES
Rosendo was unable to tolerate Keppra and then Briviact due to dizziness and drowsiness.    So will start Vimpat (100 mg PO BID- 12 hours apart) with periodic blood monitoring > cbc and LFT(s).

## 2023-05-10 ENCOUNTER — DOCUMENTATION (OUTPATIENT)
Dept: NEUROLOGY | Facility: MEDICAL CENTER | Age: 78
End: 2023-05-10
Payer: MEDICARE

## 2023-05-10 DIAGNOSIS — G40.009 PARTIAL IDIOPATHIC EPILEPSY WITH SEIZURES OF LOCALIZED ONSET, NOT INTRACTABLE, WITHOUT STATUS EPILEPTICUS (HCC): ICD-10-CM

## 2023-05-19 ENCOUNTER — OFFICE VISIT (OUTPATIENT)
Dept: URGENT CARE | Facility: PHYSICIAN GROUP | Age: 78
End: 2023-05-19
Payer: MEDICARE

## 2023-05-19 VITALS
WEIGHT: 196 LBS | HEART RATE: 64 BPM | SYSTOLIC BLOOD PRESSURE: 132 MMHG | TEMPERATURE: 97.3 F | OXYGEN SATURATION: 96 % | DIASTOLIC BLOOD PRESSURE: 70 MMHG | HEIGHT: 72 IN | RESPIRATION RATE: 16 BRPM | BODY MASS INDEX: 26.55 KG/M2

## 2023-05-19 DIAGNOSIS — J45.31 MILD PERSISTENT ASTHMA WITH EXACERBATION: ICD-10-CM

## 2023-05-19 DIAGNOSIS — J22 LRTI (LOWER RESPIRATORY TRACT INFECTION): ICD-10-CM

## 2023-05-19 DIAGNOSIS — R05.1 ACUTE COUGH: ICD-10-CM

## 2023-05-19 PROCEDURE — 3078F DIAST BP <80 MM HG: CPT | Performed by: PHYSICIAN ASSISTANT

## 2023-05-19 PROCEDURE — 99214 OFFICE O/P EST MOD 30 MIN: CPT | Performed by: PHYSICIAN ASSISTANT

## 2023-05-19 PROCEDURE — 3075F SYST BP GE 130 - 139MM HG: CPT | Performed by: PHYSICIAN ASSISTANT

## 2023-05-19 RX ORDER — AZITHROMYCIN 250 MG/1
TABLET, FILM COATED ORAL
Qty: 6 TABLET | Refills: 0 | Status: SHIPPED | OUTPATIENT
Start: 2023-05-19 | End: 2023-07-12

## 2023-05-19 RX ORDER — METHYLPREDNISOLONE 4 MG/1
TABLET ORAL
Qty: 21 TABLET | Refills: 0 | Status: SHIPPED | OUTPATIENT
Start: 2023-05-19 | End: 2023-07-12

## 2023-05-19 ASSESSMENT — ENCOUNTER SYMPTOMS: COUGH: 1

## 2023-05-19 ASSESSMENT — FIBROSIS 4 INDEX: FIB4 SCORE: 2.85

## 2023-05-19 NOTE — PROGRESS NOTES
Subjective:   Mello Isabel is a 78 y.o. male who presents for Cough (Hx of asthma coughing a lot, gurgles sound, possible pneumonia, 1 week, SOB, wet cough, no phlegm coming up, painful coughing) and Chest Pressure  This is a very pleasant 78-year-old male who presents with chief complaint of cough x1 week.  His wife is his primary historian as he does have underlying dementia.  She reports he has a history of asthma and has been somewhat noncompliant with his albuterol.  She is concerned he may have pneumonia as his cough sounded wet she believes he is more short of breath.  He has developed some pain in the chest and chest congestion due to coughing.  No COVID concerns.  No fevers or chills.  Questionable shortness of breath at times.  He does not use tobacco.  He has been treated for bronchitis in the past.  He has no history of congestive heart failure.  He denies PND orthopnea or leg swelling.  No chest pain.        Review of Systems   Respiratory:  Positive for cough.        Medications:  ALBUTEROL SULFATE HFA INH  aspirin EC Tbec  atorvastatin  cetirizine Tabs  lacosamide Tabs  levETIRAcetam Tabs  lisinopril  MULTIVITAMIN PO  sertraline  TURMERIC PO  Vitamin C Tabs  vitamin D3 Caps    Allergies:             Other environmental    Surgical History:         Past Surgical History:   Procedure Laterality Date    PB KNEE SCOPE,MED/LAT MENISECTOMY Right 2/25/2022    Procedure: RIGHT KNEE ARTHROSCOPY PARTIAL MEDIAL MENISCECTOMY, POSSIBLE CHONDROPLASTY, REPAIRS AS INDICATED;  Surgeon: Tremaine Sarkar M.D.;  Location: [facility];  Service: Orthopedics    CATARACT PHACO WITH IOL      gilbert eyes    KNEE ARTHROSCOPY      x2 left    RETINAL DETACHMENT REPAIR      gilbert eyes       Past Social Hx:  Mello Isabel  reports that he quit smoking about 32 years ago. His smoking use included cigarettes. He started smoking about 58 years ago. He has a 26.00 pack-year smoking history. He quit smokeless  tobacco use about 19 years ago.  His smokeless tobacco use included chew. He reports that he does not drink alcohol and does not use drugs.     Past Family Hx:   Mello Isabel family history includes Alcohol abuse in his father; Cancer in his mother; Diabetes in his brother and brother; Hyperlipidemia in his brother, brother, and father; Hypertension in his brother, brother, and father; Stroke in his father.       Problem list, medications, and allergies reviewed by myself today in Epic.     Objective:     /70   Pulse 64   Temp 36.3 °C (97.3 °F) (Temporal)   Resp 16   Ht 1.829 m (6')   Wt 88.9 kg (196 lb)   SpO2 96%   BMI 26.58 kg/m²     Physical Exam  Vitals and nursing note reviewed.   Constitutional:       General: He is not in acute distress.     Appearance: He is well-developed. He is not ill-appearing or diaphoretic.   HENT:      Head: Normocephalic.      Right Ear: Tympanic membrane, ear canal and external ear normal.      Left Ear: Tympanic membrane, ear canal and external ear normal.      Nose: Mucosal edema and rhinorrhea present.      Mouth/Throat:      Pharynx: Posterior oropharyngeal erythema present.   Eyes:      General:         Right eye: No discharge.         Left eye: No discharge.      Conjunctiva/sclera: Conjunctivae normal.      Pupils: Pupils are equal, round, and reactive to light.   Cardiovascular:      Rate and Rhythm: Normal rate and regular rhythm.      Pulses: Normal pulses.      Heart sounds: Normal heart sounds. No murmur heard.     No friction rub.   Pulmonary:      Effort: Pulmonary effort is normal. No accessory muscle usage or respiratory distress.      Breath sounds: No stridor. No wheezing, rhonchi or rales.      Comments: Lungs are clear to auscultation bilaterally, no rhonchi rales or wheezes.  Intermittent spasmodic cough appreciated, somewhat wet sounding  Abdominal:      Palpations: Abdomen is soft.   Musculoskeletal:         General: Normal range of  motion.      Cervical back: Normal range of motion.      Right lower leg: No edema.      Left lower leg: No edema.   Lymphadenopathy:      Cervical: No cervical adenopathy.   Skin:     General: Skin is warm and dry.   Neurological:      Mental Status: He is alert and oriented to person, place, and time.         Assessment/Plan:     Diagnosis and Associated Orders:     1. LRTI (lower respiratory tract infection)  - azithromycin (ZITHROMAX) 250 MG Tab; Take 2 tablets by mouth on day one. Take one tablet by mouth the remaining days until gone  Dispense: 6 Tablet; Refill: 0  - methylPREDNISolone (MEDROL DOSEPAK) 4 MG Tablet Therapy Pack; Follow schedule on package instructions.  Dispense: 21 Tablet; Refill: 0    2. Mild persistent asthma with exacerbation    3. Acute cough        Comments/MDM:  Patient presents greater than 7-day history of progressive cough associated with shortness of breath and chest discomfort.  Has underlying chronic asthma using inhaler regularly.  Recommend albuterol inhaler every 4-6 hours, trial of nonsedating antihistamine with Flonase progressive nature of symptoms, weakness shortness of breath with history of bronchitis.  Medrol Dosepak as prescribed, advised waiting 1 to 2 days and if symptoms do not improve at that time.  Advise follow-up emergently with increased shortness of breath, fever, chills, malaise.  He denies symptoms of congestive heart failure.  I personally reviewed prior external notes and test results pertinent to today's visit. Supportive care, natural history, differential diagnoses, and indications for immediate follow-up discussed. Return to clinic or go to ED if symptoms worsen or persist.  Red flag symptoms discussed.  Patient/Parent/Guardian voices understanding. Follow-up with your primary care provider in 3-5 days.  All side effects of medication discussed including allergic response, GI upset, tendon injury, rash, sedation etc    Please note that this dictation was  created using voice recognition software. I have made a reasonable attempt to correct obvious errors, but I expect that there are errors of grammar and possibly content that I did not discover before finalizing the note.    This note was electronically signed by Britta Husain PA-C

## 2023-07-03 ENCOUNTER — HOSPITAL ENCOUNTER (OUTPATIENT)
Dept: LAB | Facility: MEDICAL CENTER | Age: 78
End: 2023-07-03
Payer: MEDICARE

## 2023-07-03 ENCOUNTER — APPOINTMENT (OUTPATIENT)
Dept: NEUROLOGY | Facility: MEDICAL CENTER | Age: 78
End: 2023-07-03
Attending: PSYCHIATRY & NEUROLOGY
Payer: MEDICARE

## 2023-07-03 DIAGNOSIS — E11.51 DM (DIABETES MELLITUS) TYPE II, CONTROLLED, WITH PERIPHERAL VASCULAR DISORDER (HCC): Chronic | ICD-10-CM

## 2023-07-03 DIAGNOSIS — Z13.21 ENCOUNTER FOR VITAMIN DEFICIENCY SCREENING: ICD-10-CM

## 2023-07-03 DIAGNOSIS — E78.5 HYPERLIPIDEMIA LDL GOAL <70: ICD-10-CM

## 2023-07-03 LAB
25(OH)D3 SERPL-MCNC: 49 NG/ML (ref 30–100)
ALBUMIN SERPL BCP-MCNC: 4.5 G/DL (ref 3.2–4.9)
ALBUMIN/GLOB SERPL: 2.3 G/DL
ALP SERPL-CCNC: 67 U/L (ref 30–99)
ALT SERPL-CCNC: 12 U/L (ref 2–50)
ANION GAP SERPL CALC-SCNC: 11 MMOL/L (ref 7–16)
AST SERPL-CCNC: 20 U/L (ref 12–45)
BASOPHILS # BLD AUTO: 0.6 % (ref 0–1.8)
BASOPHILS # BLD: 0.03 K/UL (ref 0–0.12)
BILIRUB SERPL-MCNC: 0.6 MG/DL (ref 0.1–1.5)
BUN SERPL-MCNC: 33 MG/DL (ref 8–22)
CALCIUM ALBUM COR SERPL-MCNC: 9.1 MG/DL (ref 8.5–10.5)
CALCIUM SERPL-MCNC: 9.5 MG/DL (ref 8.5–10.5)
CHLORIDE SERPL-SCNC: 103 MMOL/L (ref 96–112)
CHOLEST SERPL-MCNC: 169 MG/DL (ref 100–199)
CO2 SERPL-SCNC: 23 MMOL/L (ref 20–33)
CREAT SERPL-MCNC: 1.42 MG/DL (ref 0.5–1.4)
CREAT UR-MCNC: 95.73 MG/DL
EOSINOPHIL # BLD AUTO: 0.25 K/UL (ref 0–0.51)
EOSINOPHIL NFR BLD: 5.2 % (ref 0–6.9)
ERYTHROCYTE [DISTWIDTH] IN BLOOD BY AUTOMATED COUNT: 43 FL (ref 35.9–50)
EST. AVERAGE GLUCOSE BLD GHB EST-MCNC: 140 MG/DL
FASTING STATUS PATIENT QL REPORTED: NORMAL
GFR SERPLBLD CREATININE-BSD FMLA CKD-EPI: 51 ML/MIN/1.73 M 2
GLOBULIN SER CALC-MCNC: 2 G/DL (ref 1.9–3.5)
GLUCOSE SERPL-MCNC: 102 MG/DL (ref 65–99)
HBA1C MFR BLD: 6.5 % (ref 4–5.6)
HCT VFR BLD AUTO: 42.6 % (ref 42–52)
HDLC SERPL-MCNC: 54 MG/DL
HGB BLD-MCNC: 13.8 G/DL (ref 14–18)
IMM GRANULOCYTES # BLD AUTO: 0.01 K/UL (ref 0–0.11)
IMM GRANULOCYTES NFR BLD AUTO: 0.2 % (ref 0–0.9)
LDLC SERPL CALC-MCNC: 100 MG/DL
LYMPHOCYTES # BLD AUTO: 1.73 K/UL (ref 1–4.8)
LYMPHOCYTES NFR BLD: 36.2 % (ref 22–41)
MCH RBC QN AUTO: 28.6 PG (ref 27–33)
MCHC RBC AUTO-ENTMCNC: 32.4 G/DL (ref 32.3–36.5)
MCV RBC AUTO: 88.4 FL (ref 81.4–97.8)
MICROALBUMIN UR-MCNC: <1.2 MG/DL
MICROALBUMIN/CREAT UR: NORMAL MG/G (ref 0–30)
MONOCYTES # BLD AUTO: 0.34 K/UL (ref 0–0.85)
MONOCYTES NFR BLD AUTO: 7.1 % (ref 0–13.4)
NEUTROPHILS # BLD AUTO: 2.42 K/UL (ref 1.82–7.42)
NEUTROPHILS NFR BLD: 50.7 % (ref 44–72)
NRBC # BLD AUTO: 0 K/UL
NRBC BLD-RTO: 0 /100 WBC (ref 0–0.2)
PLATELET # BLD AUTO: 160 K/UL (ref 164–446)
PMV BLD AUTO: 12.9 FL (ref 9–12.9)
POTASSIUM SERPL-SCNC: 4.9 MMOL/L (ref 3.6–5.5)
PROT SERPL-MCNC: 6.5 G/DL (ref 6–8.2)
RBC # BLD AUTO: 4.82 M/UL (ref 4.7–6.1)
SODIUM SERPL-SCNC: 137 MMOL/L (ref 135–145)
TRIGL SERPL-MCNC: 73 MG/DL (ref 0–149)
WBC # BLD AUTO: 4.8 K/UL (ref 4.8–10.8)

## 2023-07-03 PROCEDURE — 82043 UR ALBUMIN QUANTITATIVE: CPT

## 2023-07-03 PROCEDURE — 36415 COLL VENOUS BLD VENIPUNCTURE: CPT

## 2023-07-03 PROCEDURE — 82306 VITAMIN D 25 HYDROXY: CPT

## 2023-07-03 PROCEDURE — 80061 LIPID PANEL: CPT

## 2023-07-03 PROCEDURE — 85025 COMPLETE CBC W/AUTO DIFF WBC: CPT

## 2023-07-03 PROCEDURE — 83036 HEMOGLOBIN GLYCOSYLATED A1C: CPT | Mod: GA

## 2023-07-03 PROCEDURE — 82570 ASSAY OF URINE CREATININE: CPT

## 2023-07-03 PROCEDURE — 80053 COMPREHEN METABOLIC PANEL: CPT

## 2023-07-03 PROCEDURE — 84681 ASSAY OF C-PEPTIDE: CPT

## 2023-07-05 LAB — C PEPTIDE SERPL-MCNC: 1.9 NG/ML (ref 0.5–3.3)

## 2023-07-10 ENCOUNTER — TELEPHONE (OUTPATIENT)
Dept: HEALTH INFORMATION MANAGEMENT | Facility: OTHER | Age: 78
End: 2023-07-10
Payer: MEDICARE

## 2023-07-12 PROBLEM — N45.1 EPIDIDYMITIS: Status: ACTIVE | Noted: 2023-02-02

## 2023-07-14 PROBLEM — E86.0 DEHYDRATION: Status: ACTIVE | Noted: 2023-07-14

## 2023-07-14 PROBLEM — E86.0 DEHYDRATION: Chronic | Status: ACTIVE | Noted: 2023-07-14

## 2023-07-14 PROBLEM — K59.00 CONSTIPATION: Status: ACTIVE | Noted: 2023-07-14

## 2023-07-14 PROBLEM — R94.4 DECREASED GFR: Status: ACTIVE | Noted: 2023-07-14

## 2023-10-12 PROBLEM — K59.00 CONSTIPATION: Chronic | Status: ACTIVE | Noted: 2023-07-14

## 2023-10-12 PROBLEM — R42 DIZZINESS: Chronic | Status: ACTIVE | Noted: 2021-10-27

## 2023-10-12 PROBLEM — R94.4 DECREASED GFR: Chronic | Status: ACTIVE | Noted: 2023-07-14

## 2023-11-13 ENCOUNTER — OFFICE VISIT (OUTPATIENT)
Dept: URGENT CARE | Facility: PHYSICIAN GROUP | Age: 78
End: 2023-11-13
Payer: MEDICARE

## 2023-11-13 VITALS
DIASTOLIC BLOOD PRESSURE: 68 MMHG | BODY MASS INDEX: 23.03 KG/M2 | RESPIRATION RATE: 18 BRPM | HEIGHT: 72 IN | HEART RATE: 54 BPM | OXYGEN SATURATION: 98 % | SYSTOLIC BLOOD PRESSURE: 126 MMHG | WEIGHT: 170 LBS | TEMPERATURE: 97 F

## 2023-11-13 DIAGNOSIS — R05.1 ACUTE COUGH: ICD-10-CM

## 2023-11-13 DIAGNOSIS — J45.20 MILD INTERMITTENT ASTHMA WITHOUT COMPLICATION: ICD-10-CM

## 2023-11-13 LAB
FLUAV RNA SPEC QL NAA+PROBE: NEGATIVE
FLUBV RNA SPEC QL NAA+PROBE: NEGATIVE
RSV RNA SPEC QL NAA+PROBE: NEGATIVE
SARS-COV-2 RNA RESP QL NAA+PROBE: NEGATIVE

## 2023-11-13 PROCEDURE — 3078F DIAST BP <80 MM HG: CPT | Performed by: FAMILY MEDICINE

## 2023-11-13 PROCEDURE — 0241U POCT CEPHEID COV-2, FLU A/B, RSV - PCR: CPT | Performed by: FAMILY MEDICINE

## 2023-11-13 PROCEDURE — 99213 OFFICE O/P EST LOW 20 MIN: CPT | Performed by: FAMILY MEDICINE

## 2023-11-13 PROCEDURE — 3074F SYST BP LT 130 MM HG: CPT | Performed by: FAMILY MEDICINE

## 2023-11-13 RX ORDER — DOXYCYCLINE HYCLATE 100 MG
100 TABLET ORAL 2 TIMES DAILY
Qty: 14 TABLET | Refills: 0 | Status: SHIPPED | OUTPATIENT
Start: 2023-11-13 | End: 2024-03-27

## 2023-11-13 ASSESSMENT — FIBROSIS 4 INDEX: FIB4 SCORE: 2.81

## 2023-11-13 ASSESSMENT — ENCOUNTER SYMPTOMS
FEVER: 0
SORE THROAT: 0
COUGH: 1

## 2023-11-13 NOTE — PROGRESS NOTES
Subjective:     Mello Isabel is a 78 y.o. male who presents for Cough (X3-4 days: A lot of coughing, chest pain due to coughing. Inhaler providing minimal relief. )    HPI  Pt presents for evaluation of an acute problem  Patient with acute illness for the past 4 days  First day started as a cough   Has had a moderate to severe cough and is dry   Sleeping sitting up and has more SOB when laying flat   No sore throat   No headaches   Has moderate nasal congestion     Review of Systems   Constitutional:  Positive for malaise/fatigue. Negative for fever.   HENT:  Positive for congestion. Negative for ear pain and sore throat.    Respiratory:  Positive for cough.      PMH:  has a past medical history of Allergy to pollen, Asthma, mild persistent, DM (diabetes mellitus) type II controlled peripheral vascular disorder, Essential hypertension, Hyperlipidemia LDL goal <70, and TIA (transient ischemic attack) (2012).    He has no past medical history of Encounter for long-term (current) use of other medications.  MEDS:   Current Outpatient Medications:     doxycycline (VIBRAMYCIN) 100 MG Tab, Take 1 Tablet by mouth 2 times a day., Disp: 14 Tablet, Rfl: 0    RIBOFLAVIN PO, Take 1 Capsule by mouth every day., Disp: , Rfl:     lisinopril (PRINIVIL) 2.5 MG Tab, Take 1 Tablet by mouth every day., Disp: 90 Tablet, Rfl: 3    albuterol 108 (90 Base) MCG/ACT Aero Soln inhalation aerosol, Inhale 2 Puffs 3 times a day as needed for Shortness of Breath., Disp: 18 g, Rfl: 3    sertraline (ZOLOFT) 50 MG Tab, Take 1 Tablet by mouth every day., Disp: 90 Tablet, Rfl: 3    cetirizine (ZYRTEC) 10 MG Tab, Take 10 mg by mouth every day., Disp: , Rfl:     TURMERIC PO, Take  by mouth., Disp: , Rfl:     Ascorbic Acid (VITAMIN C) 1000 MG Tab, Take  by mouth every day., Disp: , Rfl:     Cholecalciferol (VITAMIN D3) 125 MCG (5000 UT) Cap, Take 1 Capsule by mouth every day., Disp: , Rfl:     aspirin EC (ECOTRIN) 81 MG Tablet Delayed Response,  Take 162 mg by mouth at bedtime., Disp: , Rfl:     Multiple Vitamin (MULTIVITAMIN PO), Take 1 Tablet by mouth every day., Disp: , Rfl:   ALLERGIES:   Allergies   Allergen Reactions    Other Environmental      SURGHX:   Past Surgical History:   Procedure Laterality Date    PB KNEE SCOPE,MED/LAT MENISECTOMY Right 2/25/2022    Procedure: RIGHT KNEE ARTHROSCOPY PARTIAL MEDIAL MENISCECTOMY, POSSIBLE CHONDROPLASTY, REPAIRS AS INDICATED;  Surgeon: Tremaine Sarkar M.D.;  Location: [facility] [place];  Service: Orthopedics    CATARACT PHACO WITH IOL      gilbert eyes    KNEE ARTHROSCOPY      x2 left    RETINAL DETACHMENT REPAIR      gilbert eyes     SOCHX:  reports that he quit smoking about 32 years ago. His smoking use included cigarettes. He started smoking about 58 years ago. He has a 26.0 pack-year smoking history. He quit smokeless tobacco use about 19 years ago.  His smokeless tobacco use included chew. He reports that he does not drink alcohol and does not use drugs.     Objective:   /68   Pulse (!) 54   Temp 36.1 °C (97 °F)   Resp 18   Ht 1.829 m (6')   Wt 77.1 kg (170 lb)   SpO2 98%   BMI 23.06 kg/m²     Physical Exam  Constitutional:       General: He is not in acute distress.     Appearance: He is well-developed. He is not diaphoretic.   HENT:      Head: Normocephalic and atraumatic.      Right Ear: Tympanic membrane, ear canal and external ear normal.      Left Ear: Tympanic membrane, ear canal and external ear normal.      Nose: Congestion present.      Mouth/Throat:      Mouth: Mucous membranes are moist.      Pharynx: Oropharynx is clear. No oropharyngeal exudate or posterior oropharyngeal erythema.   Neck:      Trachea: No tracheal deviation.   Cardiovascular:      Rate and Rhythm: Normal rate and regular rhythm.      Heart sounds: Normal heart sounds.   Pulmonary:      Effort: Pulmonary effort is normal. No respiratory distress.      Breath sounds: Normal breath sounds. No wheezing or  rales.   Musculoskeletal:         General: Normal range of motion.      Cervical back: Normal range of motion and neck supple. No tenderness.   Lymphadenopathy:      Cervical: No cervical adenopathy.   Skin:     General: Skin is warm and dry.      Findings: No rash.   Neurological:      Mental Status: He is alert.   Psychiatric:         Behavior: Behavior normal.         Thought Content: Thought content normal.         Judgment: Judgment normal.       Assessment/Plan:   Assessment    1. Acute cough  - POCT CEPHEID COV-2, FLU A/B, RSV - PCR  - doxycycline (VIBRAMYCIN) 100 MG Tab; Take 1 Tablet by mouth 2 times a day.  Dispense: 14 Tablet; Refill: 0    2. Mild intermittent asthma without complication    Patient here for evaluation of acute cough.  Point-of-care COVID/influenza/RSV all negative.  His current symptoms are most consistent with a viral illness.  His asthma does not appear exacerbated and does not require steroids at this time.  However, he is quite high risk for secondary pneumonia and discussed contingent antibiotics if he spikes a new fever over the next few days or if he rapidly worsens in the next few days.  Suspect he will never need to take antibiotic medication and instructed not to fill unless he meets specified criteria.  Will follow-up in the urgent care as needed.

## 2024-01-03 ENCOUNTER — OFFICE VISIT (OUTPATIENT)
Dept: URGENT CARE | Facility: PHYSICIAN GROUP | Age: 79
End: 2024-01-03
Payer: MEDICARE

## 2024-01-03 VITALS
DIASTOLIC BLOOD PRESSURE: 58 MMHG | OXYGEN SATURATION: 95 % | RESPIRATION RATE: 14 BRPM | TEMPERATURE: 97.9 F | BODY MASS INDEX: 23.7 KG/M2 | WEIGHT: 175 LBS | HEIGHT: 72 IN | SYSTOLIC BLOOD PRESSURE: 100 MMHG | HEART RATE: 63 BPM

## 2024-01-03 DIAGNOSIS — J32.9 RHINOSINUSITIS: ICD-10-CM

## 2024-01-03 DIAGNOSIS — R05.1 ACUTE COUGH: ICD-10-CM

## 2024-01-03 PROCEDURE — 3078F DIAST BP <80 MM HG: CPT | Performed by: FAMILY MEDICINE

## 2024-01-03 PROCEDURE — 0241U POCT CEPHEID COV-2, FLU A/B, RSV - PCR: CPT | Performed by: FAMILY MEDICINE

## 2024-01-03 PROCEDURE — 3074F SYST BP LT 130 MM HG: CPT | Performed by: FAMILY MEDICINE

## 2024-01-03 PROCEDURE — 99213 OFFICE O/P EST LOW 20 MIN: CPT | Performed by: FAMILY MEDICINE

## 2024-01-03 RX ORDER — BENZONATATE 100 MG/1
100 CAPSULE ORAL 3 TIMES DAILY PRN
Qty: 30 CAPSULE | Refills: 0 | Status: SHIPPED | OUTPATIENT
Start: 2024-01-03 | End: 2024-03-27

## 2024-01-03 RX ORDER — DOXYCYCLINE HYCLATE 100 MG
100 TABLET ORAL 2 TIMES DAILY
Qty: 14 TABLET | Refills: 0 | Status: SHIPPED | OUTPATIENT
Start: 2024-01-03 | End: 2024-01-10

## 2024-01-03 ASSESSMENT — ENCOUNTER SYMPTOMS
WEIGHT LOSS: 0
NAUSEA: 0
MYALGIAS: 0
VOMITING: 0
EYE DISCHARGE: 0
EYE REDNESS: 0

## 2024-01-03 ASSESSMENT — FIBROSIS 4 INDEX: FIB4 SCORE: 2.81

## 2024-01-03 ASSESSMENT — PAIN SCALES - GENERAL: PAINLEVEL: NO PAIN

## 2024-01-03 NOTE — PROGRESS NOTES
Subjective     Rosendo Isabel is a 78 y.o. male who presents with Pharyngitis (Few days after 12/28/2023), Cough (Few days after 12/28/2023), and Nasal Congestion (And burning )            3 days productive cough without blood in sputum. Nasal congestion/occas blood from nose. No fever. ST. SOB/wheeze. Using albuterol inhaler. PMH asthma. No PMH pneumonia. No other aggravating or alleviating factors.          Review of Systems   Constitutional:  Negative for malaise/fatigue and weight loss.   Eyes:  Negative for discharge and redness.   Gastrointestinal:  Negative for nausea and vomiting.   Musculoskeletal:  Negative for joint pain and myalgias.   Skin:  Negative for itching and rash.              Objective     /58 (BP Location: Right arm, Patient Position: Sitting, BP Cuff Size: Large adult)   Pulse 63   Temp 36.6 °C (97.9 °F)   Resp 14   Ht 1.829 m (6')   Wt 79.4 kg (175 lb)   SpO2 95%   BMI 23.73 kg/m²      Physical Exam  Constitutional:       General: He is not in acute distress.     Appearance: He is well-developed.   HENT:      Head: Normocephalic and atraumatic.      Right Ear: Tympanic membrane normal.      Left Ear: Tympanic membrane normal.      Nose: Congestion present.      Mouth/Throat:      Mouth: Mucous membranes are moist.      Pharynx: Posterior oropharyngeal erythema present.      Comments: PND  Eyes:      Conjunctiva/sclera: Conjunctivae normal.   Cardiovascular:      Rate and Rhythm: Normal rate and regular rhythm.      Heart sounds: Normal heart sounds. No murmur heard.  Pulmonary:      Effort: Pulmonary effort is normal.      Breath sounds: Normal breath sounds. No wheezing.   Skin:     General: Skin is warm and dry.      Findings: No rash.   Neurological:      Mental Status: He is alert.                             Assessment & Plan        1. Acute cough  POCT CEPHEID COV-2, FLU A/B, RSV - PCR    benzonatate (TESSALON PERLES) 100 MG Cap      2. Rhinosinusitis  doxycycline  (VIBRAMYCIN) 100 MG Tab            Differential diagnosis, natural history, supportive care, and indications for immediate follow-up were discussed.      Contingent antibiotic prescription given to patient to fill upon meeting criteria of guidelines discussed.

## 2024-01-19 ENCOUNTER — HOSPITAL ENCOUNTER (OUTPATIENT)
Dept: LAB | Facility: MEDICAL CENTER | Age: 79
End: 2024-01-19
Payer: MEDICARE

## 2024-01-19 DIAGNOSIS — E11.51 DM (DIABETES MELLITUS) TYPE II, CONTROLLED, WITH PERIPHERAL VASCULAR DISORDER (HCC): Chronic | ICD-10-CM

## 2024-01-19 LAB
ALBUMIN SERPL BCP-MCNC: 3.9 G/DL (ref 3.2–4.9)
ALBUMIN/GLOB SERPL: 1.4 G/DL
ALP SERPL-CCNC: 66 U/L (ref 30–99)
ALT SERPL-CCNC: 17 U/L (ref 2–50)
ANION GAP SERPL CALC-SCNC: 8 MMOL/L (ref 7–16)
AST SERPL-CCNC: 22 U/L (ref 12–45)
BASOPHILS # BLD AUTO: 0.9 % (ref 0–1.8)
BASOPHILS # BLD: 0.05 K/UL (ref 0–0.12)
BILIRUB SERPL-MCNC: 0.4 MG/DL (ref 0.1–1.5)
BUN SERPL-MCNC: 18 MG/DL (ref 8–22)
CALCIUM ALBUM COR SERPL-MCNC: 9.3 MG/DL (ref 8.5–10.5)
CALCIUM SERPL-MCNC: 9.2 MG/DL (ref 8.5–10.5)
CHLORIDE SERPL-SCNC: 105 MMOL/L (ref 96–112)
CHOLEST SERPL-MCNC: 250 MG/DL (ref 100–199)
CO2 SERPL-SCNC: 25 MMOL/L (ref 20–33)
CREAT SERPL-MCNC: 1.02 MG/DL (ref 0.5–1.4)
EOSINOPHIL # BLD AUTO: 0.33 K/UL (ref 0–0.51)
EOSINOPHIL NFR BLD: 6 % (ref 0–6.9)
ERYTHROCYTE [DISTWIDTH] IN BLOOD BY AUTOMATED COUNT: 44.2 FL (ref 35.9–50)
EST. AVERAGE GLUCOSE BLD GHB EST-MCNC: 123 MG/DL
FASTING STATUS PATIENT QL REPORTED: NORMAL
FOLATE SERPL-MCNC: 35.6 NG/ML
GFR SERPLBLD CREATININE-BSD FMLA CKD-EPI: 75 ML/MIN/1.73 M 2
GLOBULIN SER CALC-MCNC: 2.7 G/DL (ref 1.9–3.5)
GLUCOSE SERPL-MCNC: 95 MG/DL (ref 65–99)
HBA1C MFR BLD: 5.9 % (ref 4–5.6)
HCT VFR BLD AUTO: 41.3 % (ref 42–52)
HDLC SERPL-MCNC: 58 MG/DL
HGB BLD-MCNC: 13.5 G/DL (ref 14–18)
IMM GRANULOCYTES # BLD AUTO: 0.01 K/UL (ref 0–0.11)
IMM GRANULOCYTES NFR BLD AUTO: 0.2 % (ref 0–0.9)
LDLC SERPL CALC-MCNC: 174 MG/DL
LYMPHOCYTES # BLD AUTO: 1.71 K/UL (ref 1–4.8)
LYMPHOCYTES NFR BLD: 30.9 % (ref 22–41)
MCH RBC QN AUTO: 29.2 PG (ref 27–33)
MCHC RBC AUTO-ENTMCNC: 32.7 G/DL (ref 32.3–36.5)
MCV RBC AUTO: 89.4 FL (ref 81.4–97.8)
MONOCYTES # BLD AUTO: 0.34 K/UL (ref 0–0.85)
MONOCYTES NFR BLD AUTO: 6.1 % (ref 0–13.4)
NEUTROPHILS # BLD AUTO: 3.09 K/UL (ref 1.82–7.42)
NEUTROPHILS NFR BLD: 55.9 % (ref 44–72)
NRBC # BLD AUTO: 0 K/UL
NRBC BLD-RTO: 0 /100 WBC (ref 0–0.2)
PLATELET # BLD AUTO: 149 K/UL (ref 164–446)
PMV BLD AUTO: 13.8 FL (ref 9–12.9)
POTASSIUM SERPL-SCNC: 4.6 MMOL/L (ref 3.6–5.5)
PROT SERPL-MCNC: 6.6 G/DL (ref 6–8.2)
RBC # BLD AUTO: 4.62 M/UL (ref 4.7–6.1)
SODIUM SERPL-SCNC: 138 MMOL/L (ref 135–145)
T4 FREE SERPL-MCNC: 1.17 NG/DL (ref 0.93–1.7)
TRIGL SERPL-MCNC: 91 MG/DL (ref 0–149)
TSH SERPL DL<=0.005 MIU/L-ACNC: 1.62 UIU/ML (ref 0.38–5.33)
VIT B12 SERPL-MCNC: >4000 PG/ML (ref 211–911)
WBC # BLD AUTO: 5.5 K/UL (ref 4.8–10.8)

## 2024-01-19 PROCEDURE — 82746 ASSAY OF FOLIC ACID SERUM: CPT

## 2024-01-19 PROCEDURE — 83036 HEMOGLOBIN GLYCOSYLATED A1C: CPT | Mod: GA

## 2024-01-19 PROCEDURE — 84439 ASSAY OF FREE THYROXINE: CPT

## 2024-01-19 PROCEDURE — 80053 COMPREHEN METABOLIC PANEL: CPT

## 2024-01-19 PROCEDURE — 84681 ASSAY OF C-PEPTIDE: CPT

## 2024-01-19 PROCEDURE — 36415 COLL VENOUS BLD VENIPUNCTURE: CPT

## 2024-01-19 PROCEDURE — 84443 ASSAY THYROID STIM HORMONE: CPT

## 2024-01-19 PROCEDURE — 82607 VITAMIN B-12: CPT

## 2024-01-19 PROCEDURE — 80061 LIPID PANEL: CPT

## 2024-01-19 PROCEDURE — 85025 COMPLETE CBC W/AUTO DIFF WBC: CPT | Mod: GA

## 2024-01-20 LAB — C PEPTIDE SERPL-MCNC: 1.4 NG/ML (ref 0.5–3.3)

## 2024-01-24 PROBLEM — S83.231A COMPLEX TEAR OF MEDIAL MENISCUS OF RIGHT KNEE AS CURRENT INJURY: Chronic | Status: ACTIVE | Noted: 2022-02-01

## 2024-01-24 PROBLEM — B07.0 PLANTAR WART OF LEFT FOOT: Chronic | Status: ACTIVE | Noted: 2024-01-24

## 2024-01-24 PROBLEM — B07.0 PLANTAR WART OF LEFT FOOT: Status: ACTIVE | Noted: 2024-01-24

## 2024-01-25 PROBLEM — E86.0 DEHYDRATION: Chronic | Status: RESOLVED | Noted: 2023-07-14 | Resolved: 2024-01-25

## 2024-01-25 PROBLEM — F33.2 SEVERE EPISODE OF RECURRENT MAJOR DEPRESSIVE DISORDER, WITHOUT PSYCHOTIC FEATURES (HCC): Status: ACTIVE | Noted: 2022-06-19

## 2024-01-25 PROBLEM — F33.2 SEVERE EPISODE OF RECURRENT MAJOR DEPRESSIVE DISORDER, WITHOUT PSYCHOTIC FEATURES (HCC): Chronic | Status: ACTIVE | Noted: 2022-06-19

## 2024-01-25 PROBLEM — N18.2 STAGE 2 CHRONIC KIDNEY DISEASE: Status: ACTIVE | Noted: 2023-07-14

## 2024-01-25 PROBLEM — N18.2 STAGE 2 CHRONIC KIDNEY DISEASE: Chronic | Status: ACTIVE | Noted: 2023-07-14

## 2024-01-25 PROBLEM — N45.1 EPIDIDYMITIS: Status: RESOLVED | Noted: 2023-02-02 | Resolved: 2024-01-25

## 2024-01-25 PROBLEM — R07.9 PAIN IN THE CHEST: Status: RESOLVED | Noted: 2021-10-27 | Resolved: 2024-01-25

## 2024-03-20 ENCOUNTER — HOSPITAL ENCOUNTER (OUTPATIENT)
Dept: LAB | Facility: MEDICAL CENTER | Age: 79
End: 2024-03-20
Payer: MEDICARE

## 2024-03-20 DIAGNOSIS — E78.5 HYPERLIPIDEMIA LDL GOAL <70: Chronic | ICD-10-CM

## 2024-03-20 LAB
CHOLEST SERPL-MCNC: 141 MG/DL (ref 100–199)
FASTING STATUS PATIENT QL REPORTED: NORMAL
HDLC SERPL-MCNC: 49 MG/DL
LDLC SERPL CALC-MCNC: 78 MG/DL
TRIGL SERPL-MCNC: 70 MG/DL (ref 0–149)

## 2024-03-20 PROCEDURE — 36415 COLL VENOUS BLD VENIPUNCTURE: CPT

## 2024-03-20 PROCEDURE — 80061 LIPID PANEL: CPT

## 2024-03-29 ENCOUNTER — HOSPITAL ENCOUNTER (OUTPATIENT)
Dept: LAB | Facility: MEDICAL CENTER | Age: 79
End: 2024-03-29
Attending: NURSE PRACTITIONER
Payer: MEDICARE

## 2024-03-29 ENCOUNTER — HOSPITAL ENCOUNTER (OUTPATIENT)
Dept: RADIOLOGY | Facility: MEDICAL CENTER | Age: 79
End: 2024-03-29
Attending: NURSE PRACTITIONER
Payer: MEDICARE

## 2024-03-29 DIAGNOSIS — I25.810 ATHEROSCLEROSIS OF CORONARY ARTERY BYPASS GRAFT WITHOUT ANGINA PECTORIS, UNSPECIFIED WHETHER NATIVE OR TRANSPLANTED HEART: ICD-10-CM

## 2024-03-29 DIAGNOSIS — I70.0 AORTIC ATHEROSCLEROSIS (HCC): ICD-10-CM

## 2024-03-29 DIAGNOSIS — I15.9 SECONDARY HYPERTENSION: ICD-10-CM

## 2024-03-29 DIAGNOSIS — E78.5 HYPERLIPIDEMIA, UNSPECIFIED HYPERLIPIDEMIA TYPE: ICD-10-CM

## 2024-03-29 DIAGNOSIS — J45.909 UNCOMPLICATED ASTHMA, UNSPECIFIED ASTHMA SEVERITY, UNSPECIFIED WHETHER PERSISTENT: ICD-10-CM

## 2024-03-29 DIAGNOSIS — N25.89: ICD-10-CM

## 2024-03-29 DIAGNOSIS — N17.9 ACUTE KIDNEY INJURY (NONTRAUMATIC) (HCC): ICD-10-CM

## 2024-03-29 DIAGNOSIS — E11.29 TYPE 2 DIABETES MELLITUS WITH OTHER KIDNEY COMPLICATION, UNSPECIFIED WHETHER LONG TERM INSULIN USE (HCC): ICD-10-CM

## 2024-03-29 PROBLEM — F33.42 RECURRENT MAJOR DEPRESSIVE DISORDER, IN FULL REMISSION (HCC): Status: ACTIVE | Noted: 2022-06-19

## 2024-03-29 LAB
25(OH)D3 SERPL-MCNC: 53 NG/ML (ref 30–100)
ALBUMIN SERPL BCP-MCNC: 4.6 G/DL (ref 3.2–4.9)
ALBUMIN/GLOB SERPL: 1.8 G/DL
ALP SERPL-CCNC: 56 U/L (ref 30–99)
ALT SERPL-CCNC: 13 U/L (ref 2–50)
ANION GAP SERPL CALC-SCNC: 10 MMOL/L (ref 7–16)
APPEARANCE UR: CLEAR
AST SERPL-CCNC: 20 U/L (ref 12–45)
BASOPHILS # BLD AUTO: 0.5 % (ref 0–1.8)
BASOPHILS # BLD: 0.04 K/UL (ref 0–0.12)
BILIRUB SERPL-MCNC: 0.3 MG/DL (ref 0.1–1.5)
BILIRUB UR QL STRIP.AUTO: NEGATIVE
BUN SERPL-MCNC: 43 MG/DL (ref 8–22)
CALCIUM ALBUM COR SERPL-MCNC: 9.2 MG/DL (ref 8.5–10.5)
CALCIUM SERPL-MCNC: 9.7 MG/DL (ref 8.5–10.5)
CHLORIDE SERPL-SCNC: 104 MMOL/L (ref 96–112)
CO2 SERPL-SCNC: 23 MMOL/L (ref 20–33)
COLLECT DURATION TIME SPEC: NORMAL HRS
COLOR UR: YELLOW
CREAT SERPL-MCNC: 2.02 MG/DL (ref 0.5–1.4)
CREAT UR-MCNC: 71.1 MG/DL
CREAT UR-MCNC: 72.59 MG/DL
EOSINOPHIL # BLD AUTO: 0.12 K/UL (ref 0–0.51)
EOSINOPHIL NFR BLD: 1.5 % (ref 0–6.9)
ERYTHROCYTE [DISTWIDTH] IN BLOOD BY AUTOMATED COUNT: 44.2 FL (ref 35.9–50)
EST. AVERAGE GLUCOSE BLD GHB EST-MCNC: 126 MG/DL
GFR SERPLBLD CREATININE-BSD FMLA CKD-EPI: 33 ML/MIN/1.73 M 2
GLOBULIN SER CALC-MCNC: 2.5 G/DL (ref 1.9–3.5)
GLUCOSE SERPL-MCNC: 94 MG/DL (ref 65–99)
GLUCOSE UR STRIP.AUTO-MCNC: NEGATIVE MG/DL
HBA1C MFR BLD: 6 % (ref 4–5.6)
HCT VFR BLD AUTO: 40.5 % (ref 42–52)
HGB BLD-MCNC: 12.8 G/DL (ref 14–18)
IMM GRANULOCYTES # BLD AUTO: 0.02 K/UL (ref 0–0.11)
IMM GRANULOCYTES NFR BLD AUTO: 0.3 % (ref 0–0.9)
KETONES UR STRIP.AUTO-MCNC: NEGATIVE MG/DL
LEUKOCYTE ESTERASE UR QL STRIP.AUTO: NEGATIVE
LYMPHOCYTES # BLD AUTO: 2.02 K/UL (ref 1–4.8)
LYMPHOCYTES NFR BLD: 25.6 % (ref 22–41)
MAGNESIUM SERPL-MCNC: 2 MG/DL (ref 1.5–2.5)
MCH RBC QN AUTO: 27.9 PG (ref 27–33)
MCHC RBC AUTO-ENTMCNC: 31.6 G/DL (ref 32.3–36.5)
MCV RBC AUTO: 88.2 FL (ref 81.4–97.8)
MICRO URNS: NORMAL
MICROALBUMIN UR-MCNC: 5.6 MG/DL
MICROALBUMIN/CREAT UR: 77 MG/G (ref 0–30)
MONOCYTES # BLD AUTO: 0.5 K/UL (ref 0–0.85)
MONOCYTES NFR BLD AUTO: 6.3 % (ref 0–13.4)
NEUTROPHILS # BLD AUTO: 5.2 K/UL (ref 1.82–7.42)
NEUTROPHILS NFR BLD: 65.8 % (ref 44–72)
NITRITE UR QL STRIP.AUTO: NEGATIVE
NRBC # BLD AUTO: 0 K/UL
NRBC BLD-RTO: 0 /100 WBC (ref 0–0.2)
PH UR STRIP.AUTO: 6 [PH] (ref 5–8)
PHOSPHATE SERPL-MCNC: 3.8 MG/DL (ref 2.5–4.5)
PLATELET # BLD AUTO: 167 K/UL (ref 164–446)
PMV BLD AUTO: 13 FL (ref 9–12.9)
POTASSIUM SERPL-SCNC: 5.3 MMOL/L (ref 3.6–5.5)
PROT SERPL-MCNC: 7.1 G/DL (ref 6–8.2)
PROT UR QL STRIP: NEGATIVE MG/DL
PROT UR-MCNC: 12 MG/DL (ref 0–15)
PROT/CREAT UR: 169 MG/G (ref 15–68)
PTH-INTACT SERPL-MCNC: 47.4 PG/ML (ref 14–72)
RBC # BLD AUTO: 4.59 M/UL (ref 4.7–6.1)
RBC UR QL AUTO: NEGATIVE
SODIUM SERPL-SCNC: 137 MMOL/L (ref 135–145)
SP GR UR STRIP.AUTO: 1.02
SPECIMEN VOL ?TM UR: NORMAL ML
URATE SERPL-MCNC: 5.2 MG/DL (ref 2.5–8.3)
UROBILINOGEN UR STRIP.AUTO-MCNC: 0.2 MG/DL
WBC # BLD AUTO: 7.9 K/UL (ref 4.8–10.8)

## 2024-03-29 PROCEDURE — 86335 IMMUNFIX E-PHORSIS/URINE/CSF: CPT

## 2024-03-29 PROCEDURE — 81003 URINALYSIS AUTO W/O SCOPE: CPT

## 2024-03-29 PROCEDURE — 84156 ASSAY OF PROTEIN URINE: CPT

## 2024-03-29 PROCEDURE — 83036 HEMOGLOBIN GLYCOSYLATED A1C: CPT | Mod: GA

## 2024-03-29 PROCEDURE — 82306 VITAMIN D 25 HYDROXY: CPT | Mod: GA

## 2024-03-29 PROCEDURE — 84166 PROTEIN E-PHORESIS/URINE/CSF: CPT

## 2024-03-29 PROCEDURE — 82043 UR ALBUMIN QUANTITATIVE: CPT

## 2024-03-29 PROCEDURE — 93975 VASCULAR STUDY: CPT

## 2024-03-29 PROCEDURE — 80053 COMPREHEN METABOLIC PANEL: CPT

## 2024-03-29 PROCEDURE — 85025 COMPLETE CBC W/AUTO DIFF WBC: CPT

## 2024-03-29 PROCEDURE — 84100 ASSAY OF PHOSPHORUS: CPT

## 2024-03-29 PROCEDURE — 84165 PROTEIN E-PHORESIS SERUM: CPT

## 2024-03-29 PROCEDURE — 83735 ASSAY OF MAGNESIUM: CPT

## 2024-03-29 PROCEDURE — 84155 ASSAY OF PROTEIN SERUM: CPT

## 2024-03-29 PROCEDURE — 83970 ASSAY OF PARATHORMONE: CPT

## 2024-03-29 PROCEDURE — 82570 ASSAY OF URINE CREATININE: CPT | Mod: 91

## 2024-03-29 PROCEDURE — 84550 ASSAY OF BLOOD/URIC ACID: CPT

## 2024-03-29 PROCEDURE — 36415 COLL VENOUS BLD VENIPUNCTURE: CPT

## 2024-04-01 LAB
ALBUMIN SERPL ELPH-MCNC: 4.51 G/DL (ref 3.75–5.01)
ALPHA1 GLOB SERPL ELPH-MCNC: 0.23 G/DL (ref 0.19–0.46)
ALPHA2 GLOB SERPL ELPH-MCNC: 0.64 G/DL (ref 0.48–1.05)
B-GLOBULIN SERPL ELPH-MCNC: 0.75 G/DL (ref 0.48–1.1)
EER PROT ELECT SER Q1092: NORMAL
GAMMA GLOB SERPL ELPH-MCNC: 0.77 G/DL (ref 0.62–1.51)
INTERPRETATION SERPL IFE-IMP: NORMAL
MONOCLONAL PROTEIN NL11656: NORMAL G/DL
PROT SERPL-MCNC: 6.9 G/DL (ref 6.3–8.2)

## 2024-04-03 LAB
ALBUMIN 24H MFR UR ELPH: 100 %
ALPHA1 GLOB 24H MFR UR ELPH: 0 %
ALPHA2 GLOB 24H MFR UR ELPH: 0 %
B-GLOBULIN 24H MFR UR ELPH: 0 %
COLLECT DURATION TIME SPEC: NORMAL HRS
EER MONOCLONAL PROTEIN STUDY, 24 HOUR U Q5964: NORMAL
GAMMA GLOB 24H MFR UR ELPH: 0 %
INTERPRETATION UR IFE-IMP: NORMAL
M PROTEIN 24H MFR UR ELPH: 0 %
M PROTEIN 24H UR ELPH-MRATE: NORMAL MG/24 HRS
PROT 24H UR-MRATE: NORMAL MG/D (ref 40–150)
PROT UR-MCNC: 12 MG/DL
SPECIMEN VOL ?TM UR: NORMAL ML

## 2024-04-05 ENCOUNTER — HOSPITAL ENCOUNTER (OUTPATIENT)
Dept: RADIOLOGY | Facility: MEDICAL CENTER | Age: 79
End: 2024-04-05
Attending: NURSE PRACTITIONER
Payer: MEDICARE

## 2024-04-05 DIAGNOSIS — M25.511 RIGHT SHOULDER PAIN, UNSPECIFIED CHRONICITY: ICD-10-CM

## 2024-04-05 PROCEDURE — 73030 X-RAY EXAM OF SHOULDER: CPT | Mod: RT

## 2024-05-17 ENCOUNTER — HOSPITAL ENCOUNTER (OUTPATIENT)
Dept: LAB | Facility: MEDICAL CENTER | Age: 79
End: 2024-05-17
Attending: INTERNAL MEDICINE
Payer: MEDICARE

## 2024-05-17 LAB
25(OH)D3 SERPL-MCNC: 47 NG/ML (ref 30–100)
ALBUMIN SERPL BCP-MCNC: 4.2 G/DL (ref 3.2–4.9)
ALBUMIN/GLOB SERPL: 1.8 G/DL
ALP SERPL-CCNC: 48 U/L (ref 30–99)
ALT SERPL-CCNC: 12 U/L (ref 2–50)
ANION GAP SERPL CALC-SCNC: 12 MMOL/L (ref 7–16)
APPEARANCE UR: CLEAR
AST SERPL-CCNC: 21 U/L (ref 12–45)
BACTERIA #/AREA URNS HPF: NEGATIVE /HPF
BASOPHILS # BLD AUTO: 0.5 % (ref 0–1.8)
BASOPHILS # BLD: 0.03 K/UL (ref 0–0.12)
BILIRUB SERPL-MCNC: 0.3 MG/DL (ref 0.1–1.5)
BILIRUB UR QL STRIP.AUTO: NEGATIVE
BUN SERPL-MCNC: 32 MG/DL (ref 8–22)
CALCIUM ALBUM COR SERPL-MCNC: 9.4 MG/DL (ref 8.5–10.5)
CALCIUM SERPL-MCNC: 9.6 MG/DL (ref 8.5–10.5)
CHLORIDE SERPL-SCNC: 106 MMOL/L (ref 96–112)
CO2 SERPL-SCNC: 22 MMOL/L (ref 20–33)
COLOR UR: YELLOW
CREAT SERPL-MCNC: 1.5 MG/DL (ref 0.5–1.4)
CREAT UR-MCNC: 79.04 MG/DL
CREAT UR-MCNC: 80.37 MG/DL
EOSINOPHIL # BLD AUTO: 0.1 K/UL (ref 0–0.51)
EOSINOPHIL NFR BLD: 1.7 % (ref 0–6.9)
EPI CELLS #/AREA URNS HPF: NEGATIVE /HPF
ERYTHROCYTE [DISTWIDTH] IN BLOOD BY AUTOMATED COUNT: 49.1 FL (ref 35.9–50)
FERRITIN SERPL-MCNC: 198 NG/ML (ref 22–322)
GFR SERPLBLD CREATININE-BSD FMLA CKD-EPI: 47 ML/MIN/1.73 M 2
GLOBULIN SER CALC-MCNC: 2.3 G/DL (ref 1.9–3.5)
GLUCOSE SERPL-MCNC: 81 MG/DL (ref 65–99)
GLUCOSE UR STRIP.AUTO-MCNC: NEGATIVE MG/DL
HCT VFR BLD AUTO: 31.6 % (ref 42–52)
HGB BLD-MCNC: 10.3 G/DL (ref 14–18)
HYALINE CASTS #/AREA URNS LPF: ABNORMAL /LPF
IMM GRANULOCYTES # BLD AUTO: 0.01 K/UL (ref 0–0.11)
IMM GRANULOCYTES NFR BLD AUTO: 0.2 % (ref 0–0.9)
IRON SATN MFR SERPL: 31 % (ref 15–55)
IRON SERPL-MCNC: 87 UG/DL (ref 50–180)
KETONES UR STRIP.AUTO-MCNC: ABNORMAL MG/DL
LEUKOCYTE ESTERASE UR QL STRIP.AUTO: NEGATIVE
LYMPHOCYTES # BLD AUTO: 1.63 K/UL (ref 1–4.8)
LYMPHOCYTES NFR BLD: 28.2 % (ref 22–41)
MCH RBC QN AUTO: 29.3 PG (ref 27–33)
MCHC RBC AUTO-ENTMCNC: 32.6 G/DL (ref 32.3–36.5)
MCV RBC AUTO: 89.8 FL (ref 81.4–97.8)
MICRO URNS: ABNORMAL
MICROALBUMIN UR-MCNC: 14.1 MG/DL
MICROALBUMIN/CREAT UR: 178 MG/G (ref 0–30)
MONOCYTES # BLD AUTO: 0.38 K/UL (ref 0–0.85)
MONOCYTES NFR BLD AUTO: 6.6 % (ref 0–13.4)
NEUTROPHILS # BLD AUTO: 3.63 K/UL (ref 1.82–7.42)
NEUTROPHILS NFR BLD: 62.8 % (ref 44–72)
NITRITE UR QL STRIP.AUTO: NEGATIVE
NRBC # BLD AUTO: 0 K/UL
NRBC BLD-RTO: 0 /100 WBC (ref 0–0.2)
PH UR STRIP.AUTO: 5.5 [PH] (ref 5–8)
PHOSPHATE SERPL-MCNC: 3 MG/DL (ref 2.5–4.5)
PLATELET # BLD AUTO: 158 K/UL (ref 164–446)
PMV BLD AUTO: 13.1 FL (ref 9–12.9)
POTASSIUM SERPL-SCNC: 4.5 MMOL/L (ref 3.6–5.5)
PROT SERPL-MCNC: 6.5 G/DL (ref 6–8.2)
PROT UR QL STRIP: 30 MG/DL
PROT UR-MCNC: 24 MG/DL (ref 0–15)
PROT/CREAT UR: 299 MG/G (ref 15–68)
RBC # BLD AUTO: 3.52 M/UL (ref 4.7–6.1)
RBC # URNS HPF: ABNORMAL /HPF
RBC UR QL AUTO: NEGATIVE
SODIUM SERPL-SCNC: 140 MMOL/L (ref 135–145)
SP GR UR STRIP.AUTO: 1.02
TIBC SERPL-MCNC: 285 UG/DL (ref 250–450)
UIBC SERPL-MCNC: 198 UG/DL (ref 110–370)
UROBILINOGEN UR STRIP.AUTO-MCNC: 0.2 MG/DL
WBC # BLD AUTO: 5.8 K/UL (ref 4.8–10.8)
WBC #/AREA URNS HPF: ABNORMAL /HPF

## 2024-05-26 ENCOUNTER — HOSPITAL ENCOUNTER (OUTPATIENT)
Facility: MEDICAL CENTER | Age: 79
End: 2024-05-26
Attending: INTERNAL MEDICINE
Payer: MEDICARE

## 2024-05-28 ENCOUNTER — HOSPITAL ENCOUNTER (OUTPATIENT)
Facility: MEDICAL CENTER | Age: 79
End: 2024-05-28
Attending: INTERNAL MEDICINE
Payer: MEDICARE

## 2024-05-29 ENCOUNTER — HOSPITAL ENCOUNTER (OUTPATIENT)
Facility: MEDICAL CENTER | Age: 79
End: 2024-05-29
Attending: INTERNAL MEDICINE
Payer: MEDICARE

## 2024-05-30 LAB
H PYLORI AG STL QL IA: NOT DETECTED
HEMOCCULT STL QL: NEGATIVE
LACTOFERRIN STL QL IA: NEGATIVE

## 2024-06-02 LAB — CALPROTECTIN STL-MCNT: 17 UG/G

## 2024-06-03 LAB — IMM ASSAY OCC BLD FITOB: NEGATIVE

## 2024-06-18 ENCOUNTER — HOSPITAL ENCOUNTER (OUTPATIENT)
Dept: RADIOLOGY | Facility: MEDICAL CENTER | Age: 79
End: 2024-06-18
Attending: PHYSICIAN ASSISTANT
Payer: MEDICARE

## 2024-06-18 DIAGNOSIS — M25.511 RIGHT SHOULDER PAIN, UNSPECIFIED CHRONICITY: ICD-10-CM

## 2024-06-18 DIAGNOSIS — M54.12 CERVICAL RADICULITIS: ICD-10-CM

## 2024-06-18 PROCEDURE — 73221 MRI JOINT UPR EXTREM W/O DYE: CPT | Mod: RT

## 2024-06-18 PROCEDURE — 72141 MRI NECK SPINE W/O DYE: CPT

## 2024-10-03 ENCOUNTER — HOSPITAL ENCOUNTER (OUTPATIENT)
Dept: LAB | Facility: MEDICAL CENTER | Age: 79
End: 2024-10-03
Attending: INTERNAL MEDICINE
Payer: MEDICARE

## 2024-10-03 LAB
APPEARANCE UR: CLEAR
BACTERIA #/AREA URNS HPF: NEGATIVE /HPF
BASOPHILS # BLD AUTO: 0.1 % (ref 0–1.8)
BASOPHILS # BLD: 0.01 K/UL (ref 0–0.12)
BILIRUB UR QL STRIP.AUTO: NEGATIVE
COLOR UR: YELLOW
EOSINOPHIL # BLD AUTO: 0 K/UL (ref 0–0.51)
EOSINOPHIL NFR BLD: 0 % (ref 0–6.9)
EPI CELLS #/AREA URNS HPF: NEGATIVE /HPF
ERYTHROCYTE [DISTWIDTH] IN BLOOD BY AUTOMATED COUNT: 43.6 FL (ref 35.9–50)
GLUCOSE UR STRIP.AUTO-MCNC: NEGATIVE MG/DL
HCT VFR BLD AUTO: 33.7 % (ref 42–52)
HGB BLD-MCNC: 10.6 G/DL (ref 14–18)
HYALINE CASTS #/AREA URNS LPF: ABNORMAL /LPF
IMM GRANULOCYTES # BLD AUTO: 0.02 K/UL (ref 0–0.11)
IMM GRANULOCYTES NFR BLD AUTO: 0.2 % (ref 0–0.9)
KETONES UR STRIP.AUTO-MCNC: NEGATIVE MG/DL
LEUKOCYTE ESTERASE UR QL STRIP.AUTO: NEGATIVE
LYMPHOCYTES # BLD AUTO: 1.44 K/UL (ref 1–4.8)
LYMPHOCYTES NFR BLD: 16.3 % (ref 22–41)
MCH RBC QN AUTO: 28.7 PG (ref 27–33)
MCHC RBC AUTO-ENTMCNC: 31.5 G/DL (ref 32.3–36.5)
MCV RBC AUTO: 91.3 FL (ref 81.4–97.8)
MICRO URNS: ABNORMAL
MONOCYTES # BLD AUTO: 0.49 K/UL (ref 0–0.85)
MONOCYTES NFR BLD AUTO: 5.6 % (ref 0–13.4)
NEUTROPHILS # BLD AUTO: 6.86 K/UL (ref 1.82–7.42)
NEUTROPHILS NFR BLD: 77.8 % (ref 44–72)
NITRITE UR QL STRIP.AUTO: NEGATIVE
NRBC # BLD AUTO: 0 K/UL
NRBC BLD-RTO: 0 /100 WBC (ref 0–0.2)
PH UR STRIP.AUTO: 6.5 [PH] (ref 5–8)
PLATELET # BLD AUTO: 146 K/UL (ref 164–446)
PMV BLD AUTO: 13.4 FL (ref 9–12.9)
PROT UR QL STRIP: 30 MG/DL
RBC # BLD AUTO: 3.69 M/UL (ref 4.7–6.1)
RBC # URNS HPF: ABNORMAL /HPF
RBC UR QL AUTO: NEGATIVE
SP GR UR STRIP.AUTO: 1.02
UROBILINOGEN UR STRIP.AUTO-MCNC: 0.2 MG/DL
WBC # BLD AUTO: 8.8 K/UL (ref 4.8–10.8)
WBC #/AREA URNS HPF: ABNORMAL /HPF

## 2024-10-03 PROCEDURE — 85025 COMPLETE CBC W/AUTO DIFF WBC: CPT

## 2024-10-03 PROCEDURE — 83540 ASSAY OF IRON: CPT

## 2024-10-03 PROCEDURE — 83550 IRON BINDING TEST: CPT

## 2024-10-03 PROCEDURE — 82728 ASSAY OF FERRITIN: CPT

## 2024-10-03 PROCEDURE — 84156 ASSAY OF PROTEIN URINE: CPT

## 2024-10-03 PROCEDURE — 82043 UR ALBUMIN QUANTITATIVE: CPT

## 2024-10-03 PROCEDURE — 82570 ASSAY OF URINE CREATININE: CPT

## 2024-10-03 PROCEDURE — 36415 COLL VENOUS BLD VENIPUNCTURE: CPT

## 2024-10-03 PROCEDURE — 84100 ASSAY OF PHOSPHORUS: CPT

## 2024-10-03 PROCEDURE — 80053 COMPREHEN METABOLIC PANEL: CPT

## 2024-10-03 PROCEDURE — 81001 URINALYSIS AUTO W/SCOPE: CPT

## 2024-10-04 LAB
ALBUMIN SERPL BCP-MCNC: 4.4 G/DL (ref 3.2–4.9)
ALBUMIN/GLOB SERPL: 1.7 G/DL
ALP SERPL-CCNC: 53 U/L (ref 30–99)
ALT SERPL-CCNC: 14 U/L (ref 2–50)
ANION GAP SERPL CALC-SCNC: 12 MMOL/L (ref 7–16)
AST SERPL-CCNC: 24 U/L (ref 12–45)
BILIRUB SERPL-MCNC: 0.2 MG/DL (ref 0.1–1.5)
BUN SERPL-MCNC: 32 MG/DL (ref 8–22)
CALCIUM ALBUM COR SERPL-MCNC: 9.3 MG/DL (ref 8.5–10.5)
CALCIUM SERPL-MCNC: 9.6 MG/DL (ref 8.5–10.5)
CHLORIDE SERPL-SCNC: 108 MMOL/L (ref 96–112)
CO2 SERPL-SCNC: 22 MMOL/L (ref 20–33)
CREAT SERPL-MCNC: 1.39 MG/DL (ref 0.5–1.4)
CREAT UR-MCNC: 70.34 MG/DL
CREAT UR-MCNC: 74.93 MG/DL
FERRITIN SERPL-MCNC: 163 NG/ML (ref 22–322)
GFR SERPLBLD CREATININE-BSD FMLA CKD-EPI: 51 ML/MIN/1.73 M 2
GLOBULIN SER CALC-MCNC: 2.6 G/DL (ref 1.9–3.5)
GLUCOSE SERPL-MCNC: 129 MG/DL (ref 65–99)
IRON SATN MFR SERPL: 28 % (ref 15–55)
IRON SERPL-MCNC: 79 UG/DL (ref 50–180)
MICROALBUMIN UR-MCNC: 16.8 MG/DL
MICROALBUMIN/CREAT UR: 239 MG/G (ref 0–30)
PHOSPHATE SERPL-MCNC: 2.7 MG/DL (ref 2.5–4.5)
POTASSIUM SERPL-SCNC: 4.5 MMOL/L (ref 3.6–5.5)
PROT SERPL-MCNC: 7 G/DL (ref 6–8.2)
PROT UR-MCNC: 29 MG/DL (ref 0–15)
PROT/CREAT UR: 387 MG/G (ref 15–68)
SODIUM SERPL-SCNC: 142 MMOL/L (ref 135–145)
TIBC SERPL-MCNC: 284 UG/DL (ref 250–450)
UIBC SERPL-MCNC: 205 UG/DL (ref 110–370)

## 2024-10-11 ENCOUNTER — OFFICE VISIT (OUTPATIENT)
Dept: URGENT CARE | Facility: PHYSICIAN GROUP | Age: 79
End: 2024-10-11
Payer: MEDICARE

## 2024-10-11 VITALS
SYSTOLIC BLOOD PRESSURE: 124 MMHG | HEART RATE: 67 BPM | DIASTOLIC BLOOD PRESSURE: 82 MMHG | OXYGEN SATURATION: 98 % | TEMPERATURE: 98.8 F | BODY MASS INDEX: 24.72 KG/M2 | HEIGHT: 73 IN | WEIGHT: 186.5 LBS | RESPIRATION RATE: 14 BRPM

## 2024-10-11 DIAGNOSIS — H61.21 IMPACTED CERUMEN OF RIGHT EAR: ICD-10-CM

## 2024-10-11 DIAGNOSIS — H60.501 ACUTE OTITIS EXTERNA OF RIGHT EAR, UNSPECIFIED TYPE: ICD-10-CM

## 2024-10-11 PROCEDURE — 3074F SYST BP LT 130 MM HG: CPT | Performed by: PHYSICIAN ASSISTANT

## 2024-10-11 PROCEDURE — 99213 OFFICE O/P EST LOW 20 MIN: CPT | Performed by: PHYSICIAN ASSISTANT

## 2024-10-11 PROCEDURE — 3079F DIAST BP 80-89 MM HG: CPT | Performed by: PHYSICIAN ASSISTANT

## 2024-10-11 RX ORDER — CIPROFLOXACIN AND DEXAMETHASONE 3; 1 MG/ML; MG/ML
SUSPENSION/ DROPS AURICULAR (OTIC)
Qty: 7.5 ML | Refills: 0 | Status: SHIPPED | OUTPATIENT
Start: 2024-10-11

## 2024-10-11 ASSESSMENT — FIBROSIS 4 INDEX: FIB4 SCORE: 3.47

## 2024-11-08 PROBLEM — H61.21 IMPACTED CERUMEN OF RIGHT EAR: Status: ACTIVE | Noted: 2024-11-08

## 2024-11-08 PROBLEM — F33.42 RECURRENT MAJOR DEPRESSIVE DISORDER, IN FULL REMISSION (HCC): Chronic | Status: ACTIVE | Noted: 2022-06-19

## 2024-11-08 PROBLEM — H61.21 IMPACTED CERUMEN OF RIGHT EAR: Chronic | Status: ACTIVE | Noted: 2024-11-08

## 2024-11-08 PROBLEM — Z71.89 ADVANCED CARE PLANNING/COUNSELING DISCUSSION: Status: ACTIVE | Noted: 2024-11-08

## 2024-11-21 ENCOUNTER — HOSPITAL ENCOUNTER (OUTPATIENT)
Dept: LAB | Facility: MEDICAL CENTER | Age: 79
End: 2024-11-21
Payer: MEDICARE

## 2024-11-21 DIAGNOSIS — Z20.1 EXPOSURE TO TB: ICD-10-CM

## 2024-11-21 PROCEDURE — 86480 TB TEST CELL IMMUN MEASURE: CPT

## 2024-11-21 PROCEDURE — 36415 COLL VENOUS BLD VENIPUNCTURE: CPT

## 2024-11-25 LAB
GAMMA INTERFERON BACKGROUND BLD IA-ACNC: 0.02 IU/ML
M TB IFN-G BLD-IMP: NEGATIVE
M TB IFN-G CD4+ BCKGRND COR BLD-ACNC: -0.01 IU/ML
MITOGEN IGNF BCKGRD COR BLD-ACNC: >10 IU/ML
QFT TB2 - NIL TBQ2: 0 IU/ML

## 2024-12-11 PROBLEM — N18.31 STAGE 3A CHRONIC KIDNEY DISEASE (CKD): Status: ACTIVE | Noted: 2023-07-14

## 2024-12-11 PROBLEM — G47.00 INSOMNIA: Status: ACTIVE | Noted: 2024-12-11

## 2024-12-11 PROBLEM — R42 DIZZINESS: Chronic | Status: RESOLVED | Noted: 2021-10-27 | Resolved: 2024-12-11

## 2024-12-11 PROBLEM — S83.231A COMPLEX TEAR OF MEDIAL MENISCUS OF RIGHT KNEE AS CURRENT INJURY: Chronic | Status: RESOLVED | Noted: 2022-02-01 | Resolved: 2024-12-11

## 2024-12-11 PROBLEM — B07.0 PLANTAR WART OF LEFT FOOT: Chronic | Status: RESOLVED | Noted: 2024-01-24 | Resolved: 2024-12-11

## 2024-12-24 PROBLEM — R41.89 COGNITIVE CHANGE: Status: ACTIVE | Noted: 2024-12-24

## 2024-12-24 PROBLEM — K52.9 GASTROENTERITIS: Status: ACTIVE | Noted: 2024-12-24

## 2024-12-24 PROBLEM — R53.83 LETHARGY: Status: ACTIVE | Noted: 2024-12-24

## 2024-12-31 PROBLEM — R45.1 AGITATION: Status: ACTIVE | Noted: 2024-12-31

## 2025-02-19 PROBLEM — F03.911 AGITATION DUE TO DEMENTIA (HCC): Status: ACTIVE | Noted: 2024-12-31

## 2025-03-13 PROBLEM — F03.94 DEMENTIA WITH ANXIETY (HCC): Status: ACTIVE | Noted: 2024-12-31

## 2025-03-19 PROBLEM — E53.8 B12 DEFICIENCY: Status: ACTIVE | Noted: 2025-03-19

## 2025-04-16 PROBLEM — R32 URINARY INCONTINENCE: Status: ACTIVE | Noted: 2025-04-16

## 2025-04-23 PROBLEM — R22.0 FACIAL SWELLING: Status: ACTIVE | Noted: 2025-04-23

## 2025-04-23 PROBLEM — R60.9 EDEMA: Status: ACTIVE | Noted: 2025-04-23

## 2025-04-24 ENCOUNTER — HOSPITAL ENCOUNTER (OUTPATIENT)
Facility: MEDICAL CENTER | Age: 80
End: 2025-04-24
Attending: NURSE PRACTITIONER
Payer: MEDICARE

## 2025-04-24 DIAGNOSIS — D72.10 EOSINOPHILIA, UNSPECIFIED TYPE: ICD-10-CM

## 2025-04-24 DIAGNOSIS — G40.209 NONINTRACTABLE EPILEPSY WITH COMPLEX PARTIAL SEIZURES (HCC): Chronic | ICD-10-CM

## 2025-04-24 DIAGNOSIS — N18.31 STAGE 3A CHRONIC KIDNEY DISEASE (CKD): ICD-10-CM

## 2025-04-24 DIAGNOSIS — E78.5 HYPERLIPIDEMIA LDL GOAL <70: Chronic | ICD-10-CM

## 2025-04-24 DIAGNOSIS — E11.22 TYPE 2 DIABETES MELLITUS WITH STAGE 3A CHRONIC KIDNEY DISEASE, WITHOUT LONG-TERM CURRENT USE OF INSULIN (HCC): ICD-10-CM

## 2025-04-24 DIAGNOSIS — I10 ESSENTIAL HYPERTENSION: Chronic | ICD-10-CM

## 2025-04-24 DIAGNOSIS — N18.31 TYPE 2 DIABETES MELLITUS WITH STAGE 3A CHRONIC KIDNEY DISEASE, WITHOUT LONG-TERM CURRENT USE OF INSULIN (HCC): ICD-10-CM

## 2025-04-24 DIAGNOSIS — R30.0 DYSURIA: ICD-10-CM

## 2025-04-24 DIAGNOSIS — E53.8 B12 DEFICIENCY: ICD-10-CM

## 2025-04-24 PROCEDURE — 85025 COMPLETE CBC W/AUTO DIFF WBC: CPT

## 2025-04-24 PROCEDURE — 81001 URINALYSIS AUTO W/SCOPE: CPT

## 2025-04-24 PROCEDURE — 80053 COMPREHEN METABOLIC PANEL: CPT

## 2025-04-24 PROCEDURE — 84443 ASSAY THYROID STIM HORMONE: CPT

## 2025-04-24 PROCEDURE — 80061 LIPID PANEL: CPT

## 2025-04-24 PROCEDURE — 82607 VITAMIN B-12: CPT

## 2025-04-24 PROCEDURE — 82306 VITAMIN D 25 HYDROXY: CPT

## 2025-04-24 PROCEDURE — 82570 ASSAY OF URINE CREATININE: CPT

## 2025-04-24 PROCEDURE — 82043 UR ALBUMIN QUANTITATIVE: CPT

## 2025-04-24 PROCEDURE — 87086 URINE CULTURE/COLONY COUNT: CPT

## 2025-04-24 PROCEDURE — 83036 HEMOGLOBIN GLYCOSYLATED A1C: CPT

## 2025-04-25 LAB
25(OH)D3 SERPL-MCNC: 43 NG/ML (ref 30–100)
ALBUMIN SERPL BCP-MCNC: 4 G/DL (ref 3.2–4.9)
ALBUMIN/GLOB SERPL: 1.9 G/DL
ALP SERPL-CCNC: 57 U/L (ref 30–99)
ALT SERPL-CCNC: 14 U/L (ref 2–50)
ANION GAP SERPL CALC-SCNC: 9 MMOL/L (ref 7–16)
APPEARANCE UR: CLEAR
AST SERPL-CCNC: 20 U/L (ref 12–45)
BACTERIA #/AREA URNS HPF: ABNORMAL /HPF
BASOPHILS # BLD AUTO: 0.7 % (ref 0–1.8)
BASOPHILS # BLD: 0.04 K/UL (ref 0–0.12)
BILIRUB SERPL-MCNC: 0.3 MG/DL (ref 0.1–1.5)
BILIRUB UR QL STRIP.AUTO: NEGATIVE
BUN SERPL-MCNC: 28 MG/DL (ref 8–22)
CALCIUM ALBUM COR SERPL-MCNC: 8.9 MG/DL (ref 8.5–10.5)
CALCIUM SERPL-MCNC: 8.9 MG/DL (ref 8.5–10.5)
CASTS URNS QL MICRO: ABNORMAL /LPF (ref 0–2)
CHLORIDE SERPL-SCNC: 108 MMOL/L (ref 96–112)
CHOLEST SERPL-MCNC: 151 MG/DL (ref 100–199)
CO2 SERPL-SCNC: 25 MMOL/L (ref 20–33)
COLOR UR: ABNORMAL
CREAT SERPL-MCNC: 1.48 MG/DL (ref 0.5–1.4)
CREAT UR-MCNC: 166 MG/DL
EOSINOPHIL # BLD AUTO: 0.19 K/UL (ref 0–0.51)
EOSINOPHIL NFR BLD: 3.3 % (ref 0–6.9)
EPITHELIAL CELLS 1715: ABNORMAL /HPF (ref 0–5)
ERYTHROCYTE [DISTWIDTH] IN BLOOD BY AUTOMATED COUNT: 43.8 FL (ref 35.9–50)
EST. AVERAGE GLUCOSE BLD GHB EST-MCNC: 154 MG/DL
GFR SERPLBLD CREATININE-BSD FMLA CKD-EPI: 48 ML/MIN/1.73 M 2
GLOBULIN SER CALC-MCNC: 2.1 G/DL (ref 1.9–3.5)
GLUCOSE SERPL-MCNC: 111 MG/DL (ref 65–99)
GLUCOSE UR STRIP.AUTO-MCNC: NEGATIVE MG/DL
HBA1C MFR BLD: 7 % (ref 4–5.6)
HCT VFR BLD AUTO: 36.7 % (ref 42–52)
HDLC SERPL-MCNC: 59 MG/DL
HGB BLD-MCNC: 11.6 G/DL (ref 14–18)
IMM GRANULOCYTES # BLD AUTO: 0.02 K/UL (ref 0–0.11)
IMM GRANULOCYTES NFR BLD AUTO: 0.3 % (ref 0–0.9)
KETONES UR STRIP.AUTO-MCNC: ABNORMAL MG/DL
LDLC SERPL CALC-MCNC: 80 MG/DL
LEUKOCYTE ESTERASE UR QL STRIP.AUTO: NEGATIVE
LYMPHOCYTES # BLD AUTO: 1.44 K/UL (ref 1–4.8)
LYMPHOCYTES NFR BLD: 25.1 % (ref 22–41)
MCH RBC QN AUTO: 28.2 PG (ref 27–33)
MCHC RBC AUTO-ENTMCNC: 31.6 G/DL (ref 32.3–36.5)
MCV RBC AUTO: 89.3 FL (ref 81.4–97.8)
MICRO URNS: ABNORMAL
MICROALBUMIN UR-MCNC: 48.3 MG/DL
MICROALBUMIN/CREAT UR: 291 MG/G (ref 0–30)
MONOCYTES # BLD AUTO: 0.44 K/UL (ref 0–0.85)
MONOCYTES NFR BLD AUTO: 7.7 % (ref 0–13.4)
NEUTROPHILS # BLD AUTO: 3.6 K/UL (ref 1.82–7.42)
NEUTROPHILS NFR BLD: 62.9 % (ref 44–72)
NITRITE UR QL STRIP.AUTO: NEGATIVE
NRBC # BLD AUTO: 0 K/UL
NRBC BLD-RTO: 0 /100 WBC (ref 0–0.2)
PH UR STRIP.AUTO: 6.5 [PH] (ref 5–8)
PLATELET # BLD AUTO: 149 K/UL (ref 164–446)
PMV BLD AUTO: 13.1 FL (ref 9–12.9)
POTASSIUM SERPL-SCNC: 4.4 MMOL/L (ref 3.6–5.5)
PROT SERPL-MCNC: 6.1 G/DL (ref 6–8.2)
PROT UR QL STRIP: 100 MG/DL
RBC # BLD AUTO: 4.11 M/UL (ref 4.7–6.1)
RBC # URNS HPF: ABNORMAL /HPF (ref 0–2)
RBC UR QL AUTO: NEGATIVE
SODIUM SERPL-SCNC: 142 MMOL/L (ref 135–145)
SP GR UR STRIP.AUTO: 1.02
TRIGL SERPL-MCNC: 59 MG/DL (ref 0–149)
TSH SERPL DL<=0.005 MIU/L-ACNC: 1.8 UIU/ML (ref 0.38–5.33)
UROBILINOGEN UR STRIP.AUTO-MCNC: 1 EU/DL
VIT B12 SERPL-MCNC: 2524 PG/ML (ref 211–911)
WBC # BLD AUTO: 5.7 K/UL (ref 4.8–10.8)
WBC #/AREA URNS HPF: ABNORMAL /HPF

## 2025-04-27 LAB
BACTERIA UR CULT: NORMAL
SIGNIFICANT IND 70042: NORMAL
SITE SITE: NORMAL
SOURCE SOURCE: NORMAL

## 2025-04-30 PROBLEM — D64.9 CHRONIC ANEMIA: Status: ACTIVE | Noted: 2025-04-30

## 2025-04-30 PROBLEM — R79.89 ELEVATED VITAMIN B12 LEVEL: Status: ACTIVE | Noted: 2025-04-30

## 2025-04-30 PROBLEM — N39.0 UTI (URINARY TRACT INFECTION): Status: ACTIVE | Noted: 2025-04-30

## 2025-05-19 ENCOUNTER — HOSPITAL ENCOUNTER (OUTPATIENT)
Facility: MEDICAL CENTER | Age: 80
End: 2025-05-19
Attending: NURSE PRACTITIONER
Payer: MEDICARE

## 2025-05-19 DIAGNOSIS — N18.31 STAGE 3A CHRONIC KIDNEY DISEASE (CKD): ICD-10-CM

## 2025-05-19 LAB
CREAT SERPL-MCNC: 1.75 MG/DL (ref 0.5–1.4)
GFR SERPLBLD CREATININE-BSD FMLA CKD-EPI: 39 ML/MIN/1.73 M 2

## 2025-05-19 PROCEDURE — 82565 ASSAY OF CREATININE: CPT

## 2025-05-21 PROBLEM — N18.32 CKD STAGE 3B, GFR 30-44 ML/MIN: Status: ACTIVE | Noted: 2023-07-14
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
## 2025-06-25 PROBLEM — S51.019A SKIN TEAR OF ELBOW WITHOUT COMPLICATION: Status: ACTIVE | Noted: 2025-06-25

## 2025-07-15 ENCOUNTER — HOSPITAL ENCOUNTER (OUTPATIENT)
Dept: LAB | Facility: MEDICAL CENTER | Age: 80
End: 2025-07-15
Attending: NURSE PRACTITIONER
Payer: MEDICARE

## 2025-07-15 DIAGNOSIS — N18.32 CKD STAGE 3B, GFR 30-44 ML/MIN: ICD-10-CM

## 2025-07-15 DIAGNOSIS — E11.22 TYPE 2 DIABETES MELLITUS WITH STAGE 3A CHRONIC KIDNEY DISEASE, WITHOUT LONG-TERM CURRENT USE OF INSULIN (HCC): ICD-10-CM

## 2025-07-15 DIAGNOSIS — N18.31 TYPE 2 DIABETES MELLITUS WITH STAGE 3A CHRONIC KIDNEY DISEASE, WITHOUT LONG-TERM CURRENT USE OF INSULIN (HCC): ICD-10-CM

## 2025-07-15 DIAGNOSIS — R41.89 COGNITIVE CHANGE: ICD-10-CM

## 2025-07-15 LAB
ALBUMIN SERPL BCP-MCNC: 4.1 G/DL (ref 3.2–4.9)
ALBUMIN/GLOB SERPL: 1.6 G/DL
ALP SERPL-CCNC: 62 U/L (ref 30–99)
ALT SERPL-CCNC: 13 U/L (ref 2–50)
ANION GAP SERPL CALC-SCNC: 12 MMOL/L (ref 7–16)
APPEARANCE UR: CLEAR
AST SERPL-CCNC: 23 U/L (ref 12–45)
BACTERIA #/AREA URNS HPF: ABNORMAL /HPF
BILIRUB SERPL-MCNC: 0.3 MG/DL (ref 0.1–1.5)
BILIRUB UR QL STRIP.AUTO: NEGATIVE
BUN SERPL-MCNC: 38 MG/DL (ref 8–22)
CALCIUM ALBUM COR SERPL-MCNC: 9.2 MG/DL (ref 8.5–10.5)
CALCIUM SERPL-MCNC: 9.3 MG/DL (ref 8.5–10.5)
CASTS URNS QL MICRO: ABNORMAL /LPF (ref 0–2)
CHLORIDE SERPL-SCNC: 106 MMOL/L (ref 96–112)
CO2 SERPL-SCNC: 23 MMOL/L (ref 20–33)
COLOR UR: YELLOW
CREAT SERPL-MCNC: 1.81 MG/DL (ref 0.5–1.4)
EPITHELIAL CELLS 1715: ABNORMAL /HPF (ref 0–5)
EST. AVERAGE GLUCOSE BLD GHB EST-MCNC: 134 MG/DL
GFR SERPLBLD CREATININE-BSD FMLA CKD-EPI: 37 ML/MIN/1.73 M 2
GLOBULIN SER CALC-MCNC: 2.6 G/DL (ref 1.9–3.5)
GLUCOSE SERPL-MCNC: 117 MG/DL (ref 65–99)
GLUCOSE UR STRIP.AUTO-MCNC: NEGATIVE MG/DL
HBA1C MFR BLD: 6.3 % (ref 4–5.6)
KETONES UR STRIP.AUTO-MCNC: ABNORMAL MG/DL
LEUKOCYTE ESTERASE UR QL STRIP.AUTO: NEGATIVE
MICRO URNS: ABNORMAL
NITRITE UR QL STRIP.AUTO: NEGATIVE
PH UR STRIP.AUTO: 6 [PH] (ref 5–8)
POTASSIUM SERPL-SCNC: 4.7 MMOL/L (ref 3.6–5.5)
PROT SERPL-MCNC: 6.7 G/DL (ref 6–8.2)
PROT UR QL STRIP: 100 MG/DL
RBC # URNS HPF: ABNORMAL /HPF (ref 0–2)
RBC UR QL AUTO: NEGATIVE
SODIUM SERPL-SCNC: 141 MMOL/L (ref 135–145)
SP GR UR STRIP.AUTO: 1.03
UROBILINOGEN UR STRIP.AUTO-MCNC: 1 EU/DL
WBC #/AREA URNS HPF: ABNORMAL /HPF

## 2025-07-15 PROCEDURE — 80053 COMPREHEN METABOLIC PANEL: CPT

## 2025-07-15 PROCEDURE — 81001 URINALYSIS AUTO W/SCOPE: CPT

## 2025-07-15 PROCEDURE — 36415 COLL VENOUS BLD VENIPUNCTURE: CPT

## 2025-07-15 PROCEDURE — 83036 HEMOGLOBIN GLYCOSYLATED A1C: CPT | Mod: GA

## 2025-07-16 PROBLEM — R35.0 URINARY FREQUENCY: Status: ACTIVE | Noted: 2025-07-16

## 2025-07-16 PROBLEM — R80.9 PROTEINURIA: Status: ACTIVE | Noted: 2025-07-16

## 2025-07-30 PROBLEM — H11.32 SUBCONJUNCTIVAL BLEED, LEFT: Status: ACTIVE | Noted: 2025-07-30
